# Patient Record
Sex: MALE | Race: WHITE | NOT HISPANIC OR LATINO
[De-identification: names, ages, dates, MRNs, and addresses within clinical notes are randomized per-mention and may not be internally consistent; named-entity substitution may affect disease eponyms.]

---

## 2017-12-15 ENCOUNTER — APPOINTMENT (OUTPATIENT)
Dept: NEUROSURGERY | Facility: CLINIC | Age: 52
End: 2017-12-15
Payer: MEDICAID

## 2017-12-15 VITALS
BODY MASS INDEX: 24.3 KG/M2 | OXYGEN SATURATION: 95 % | HEART RATE: 94 BPM | HEIGHT: 78 IN | SYSTOLIC BLOOD PRESSURE: 116 MMHG | DIASTOLIC BLOOD PRESSURE: 77 MMHG | WEIGHT: 210 LBS

## 2017-12-15 DIAGNOSIS — G40.909 EPILEPSY, UNSPECIFIED, NOT INTRACTABLE, W/OUT STATUS EPILEPTICUS: ICD-10-CM

## 2017-12-15 DIAGNOSIS — Z78.9 OTHER SPECIFIED HEALTH STATUS: ICD-10-CM

## 2017-12-15 DIAGNOSIS — D35.2 BENIGN NEOPLASM OF PITUITARY GLAND: ICD-10-CM

## 2017-12-15 PROCEDURE — 99204 OFFICE O/P NEW MOD 45 MIN: CPT

## 2018-03-01 ENCOUNTER — RESULT CHARGE (OUTPATIENT)
Age: 53
End: 2018-03-01

## 2018-03-02 ENCOUNTER — OUTPATIENT (OUTPATIENT)
Dept: OUTPATIENT SERVICES | Facility: HOSPITAL | Age: 53
LOS: 1 days | End: 2018-03-02
Payer: COMMERCIAL

## 2018-03-02 ENCOUNTER — APPOINTMENT (OUTPATIENT)
Dept: NEUROSURGERY | Facility: CLINIC | Age: 53
End: 2018-03-02
Payer: MEDICAID

## 2018-03-02 ENCOUNTER — FORM ENCOUNTER (OUTPATIENT)
Age: 53
End: 2018-03-02

## 2018-03-02 VITALS
WEIGHT: 208 LBS | BODY MASS INDEX: 24.07 KG/M2 | OXYGEN SATURATION: 98 % | SYSTOLIC BLOOD PRESSURE: 122 MMHG | HEART RATE: 80 BPM | HEIGHT: 78 IN | DIASTOLIC BLOOD PRESSURE: 78 MMHG

## 2018-03-02 DIAGNOSIS — Z01.818 ENCOUNTER FOR OTHER PREPROCEDURAL EXAMINATION: ICD-10-CM

## 2018-03-02 DIAGNOSIS — F12.90 CANNABIS USE, UNSPECIFIED, UNCOMPLICATED: ICD-10-CM

## 2018-03-02 DIAGNOSIS — Z78.9 OTHER SPECIFIED HEALTH STATUS: ICD-10-CM

## 2018-03-02 LAB
ALBUMIN SERPL ELPH-MCNC: 4.4 G/DL — SIGNIFICANT CHANGE UP (ref 3.3–5)
ALP SERPL-CCNC: 57 U/L — SIGNIFICANT CHANGE UP (ref 40–120)
ALT FLD-CCNC: 24 U/L — SIGNIFICANT CHANGE UP (ref 10–45)
ANION GAP SERPL CALC-SCNC: 14 MMOL/L — SIGNIFICANT CHANGE UP (ref 5–17)
APTT BLD: 30.1 SEC — SIGNIFICANT CHANGE UP (ref 27.5–37.4)
AST SERPL-CCNC: 22 U/L — SIGNIFICANT CHANGE UP (ref 10–40)
BILIRUB SERPL-MCNC: 0.6 MG/DL — SIGNIFICANT CHANGE UP (ref 0.2–1.2)
BUN SERPL-MCNC: 20 MG/DL — SIGNIFICANT CHANGE UP (ref 7–23)
CALCIUM SERPL-MCNC: 9.4 MG/DL — SIGNIFICANT CHANGE UP (ref 8.4–10.5)
CHLORIDE SERPL-SCNC: 100 MMOL/L — SIGNIFICANT CHANGE UP (ref 96–108)
CO2 SERPL-SCNC: 27 MMOL/L — SIGNIFICANT CHANGE UP (ref 22–31)
CREAT SERPL-MCNC: 1.23 MG/DL — SIGNIFICANT CHANGE UP (ref 0.5–1.3)
GLUCOSE SERPL-MCNC: 96 MG/DL — SIGNIFICANT CHANGE UP (ref 70–99)
HCT VFR BLD CALC: 37.9 % — LOW (ref 39–50)
HGB BLD-MCNC: 12.4 G/DL — LOW (ref 13–17)
INR BLD: 0.92 — SIGNIFICANT CHANGE UP (ref 0.88–1.16)
MCHC RBC-ENTMCNC: 29.8 PG — SIGNIFICANT CHANGE UP (ref 27–34)
MCHC RBC-ENTMCNC: 32.7 G/DL — SIGNIFICANT CHANGE UP (ref 32–36)
MCV RBC AUTO: 91.1 FL — SIGNIFICANT CHANGE UP (ref 80–100)
PLATELET # BLD AUTO: 215 K/UL — SIGNIFICANT CHANGE UP (ref 150–400)
POTASSIUM SERPL-MCNC: 4.3 MMOL/L — SIGNIFICANT CHANGE UP (ref 3.5–5.3)
POTASSIUM SERPL-SCNC: 4.3 MMOL/L — SIGNIFICANT CHANGE UP (ref 3.5–5.3)
PROT SERPL-MCNC: 6.9 G/DL — SIGNIFICANT CHANGE UP (ref 6–8.3)
PROTHROM AB SERPL-ACNC: 10.2 SEC — SIGNIFICANT CHANGE UP (ref 9.8–12.7)
RBC # BLD: 4.16 M/UL — LOW (ref 4.2–5.8)
RBC # FLD: 14.4 % — SIGNIFICANT CHANGE UP (ref 10.3–16.9)
SODIUM SERPL-SCNC: 141 MMOL/L — SIGNIFICANT CHANGE UP (ref 135–145)
WBC # BLD: 4.8 K/UL — SIGNIFICANT CHANGE UP (ref 3.8–10.5)
WBC # FLD AUTO: 4.8 K/UL — SIGNIFICANT CHANGE UP (ref 3.8–10.5)

## 2018-03-02 PROCEDURE — 93010 ELECTROCARDIOGRAM REPORT: CPT

## 2018-03-02 PROCEDURE — 99214 OFFICE O/P EST MOD 30 MIN: CPT

## 2018-03-02 PROCEDURE — 85027 COMPLETE CBC AUTOMATED: CPT

## 2018-03-02 PROCEDURE — 85730 THROMBOPLASTIN TIME PARTIAL: CPT

## 2018-03-02 PROCEDURE — 80053 COMPREHEN METABOLIC PANEL: CPT

## 2018-03-02 PROCEDURE — 85610 PROTHROMBIN TIME: CPT

## 2018-03-02 PROCEDURE — 93005 ELECTROCARDIOGRAM TRACING: CPT

## 2018-03-03 ENCOUNTER — OUTPATIENT (OUTPATIENT)
Dept: OUTPATIENT SERVICES | Facility: HOSPITAL | Age: 53
LOS: 1 days | End: 2018-03-03
Payer: COMMERCIAL

## 2018-03-03 ENCOUNTER — APPOINTMENT (OUTPATIENT)
Dept: MRI IMAGING | Facility: HOSPITAL | Age: 53
End: 2018-03-03
Payer: MEDICAID

## 2018-03-03 PROBLEM — F12.90 USE OF CANNABIS: Status: ACTIVE | Noted: 2018-03-03

## 2018-03-03 PROCEDURE — 70553 MRI BRAIN STEM W/O & W/DYE: CPT | Mod: 26

## 2018-03-03 PROCEDURE — A9585: CPT

## 2018-03-03 PROCEDURE — 70553 MRI BRAIN STEM W/O & W/DYE: CPT

## 2018-03-12 VITALS
HEART RATE: 80 BPM | HEIGHT: 78 IN | DIASTOLIC BLOOD PRESSURE: 65 MMHG | SYSTOLIC BLOOD PRESSURE: 104 MMHG | RESPIRATION RATE: 16 BRPM | WEIGHT: 218.92 LBS | OXYGEN SATURATION: 98 % | TEMPERATURE: 97 F

## 2018-03-13 ENCOUNTER — RESULT REVIEW (OUTPATIENT)
Age: 53
End: 2018-03-13

## 2018-03-13 ENCOUNTER — APPOINTMENT (OUTPATIENT)
Dept: NEUROSURGERY | Facility: HOSPITAL | Age: 53
End: 2018-03-13

## 2018-03-13 ENCOUNTER — INPATIENT (INPATIENT)
Facility: HOSPITAL | Age: 53
LOS: 6 days | Discharge: NURSING, REHAB OR LONG-TERM CARE | DRG: 27 | End: 2018-03-20
Attending: NEUROLOGICAL SURGERY | Admitting: NEUROLOGICAL SURGERY
Payer: COMMERCIAL

## 2018-03-13 DIAGNOSIS — F41.8 OTHER SPECIFIED ANXIETY DISORDERS: ICD-10-CM

## 2018-03-13 DIAGNOSIS — C71.9 MALIGNANT NEOPLASM OF BRAIN, UNSPECIFIED: ICD-10-CM

## 2018-03-13 DIAGNOSIS — Z98.890 OTHER SPECIFIED POSTPROCEDURAL STATES: Chronic | ICD-10-CM

## 2018-03-13 DIAGNOSIS — G40.909 EPILEPSY, UNSPECIFIED, NOT INTRACTABLE, WITHOUT STATUS EPILEPTICUS: ICD-10-CM

## 2018-03-13 LAB
ANION GAP SERPL CALC-SCNC: 11 MMOL/L — SIGNIFICANT CHANGE UP (ref 5–17)
BUN SERPL-MCNC: 20 MG/DL — SIGNIFICANT CHANGE UP (ref 7–23)
CALCIUM SERPL-MCNC: 9.1 MG/DL — SIGNIFICANT CHANGE UP (ref 8.4–10.5)
CHLORIDE SERPL-SCNC: 97 MMOL/L — SIGNIFICANT CHANGE UP (ref 96–108)
CO2 SERPL-SCNC: 27 MMOL/L — SIGNIFICANT CHANGE UP (ref 22–31)
CREAT SERPL-MCNC: 1.17 MG/DL — SIGNIFICANT CHANGE UP (ref 0.5–1.3)
GLUCOSE BLDC GLUCOMTR-MCNC: 129 MG/DL — HIGH (ref 70–99)
GLUCOSE BLDC GLUCOMTR-MCNC: 143 MG/DL — HIGH (ref 70–99)
GLUCOSE SERPL-MCNC: 145 MG/DL — HIGH (ref 70–99)
HCT VFR BLD CALC: 39.8 % — SIGNIFICANT CHANGE UP (ref 39–50)
HGB BLD-MCNC: 13.2 G/DL — SIGNIFICANT CHANGE UP (ref 13–17)
INR BLD: 0.94 — SIGNIFICANT CHANGE UP (ref 0.88–1.16)
MAGNESIUM SERPL-MCNC: 2 MG/DL — SIGNIFICANT CHANGE UP (ref 1.6–2.6)
MCHC RBC-ENTMCNC: 30.6 PG — SIGNIFICANT CHANGE UP (ref 27–34)
MCHC RBC-ENTMCNC: 33.2 G/DL — SIGNIFICANT CHANGE UP (ref 32–36)
MCV RBC AUTO: 92.1 FL — SIGNIFICANT CHANGE UP (ref 80–100)
PHOSPHATE SERPL-MCNC: 3 MG/DL — SIGNIFICANT CHANGE UP (ref 2.5–4.5)
PLATELET # BLD AUTO: 202 K/UL — SIGNIFICANT CHANGE UP (ref 150–400)
POTASSIUM SERPL-MCNC: 4.2 MMOL/L — SIGNIFICANT CHANGE UP (ref 3.5–5.3)
POTASSIUM SERPL-SCNC: 4.2 MMOL/L — SIGNIFICANT CHANGE UP (ref 3.5–5.3)
PROTHROM AB SERPL-ACNC: 10.4 SEC — SIGNIFICANT CHANGE UP (ref 9.8–12.7)
RBC # BLD: 4.32 M/UL — SIGNIFICANT CHANGE UP (ref 4.2–5.8)
RBC # FLD: 14.7 % — SIGNIFICANT CHANGE UP (ref 10.3–16.9)
SODIUM SERPL-SCNC: 135 MMOL/L — SIGNIFICANT CHANGE UP (ref 135–145)
WBC # BLD: 8.8 K/UL — SIGNIFICANT CHANGE UP (ref 3.8–10.5)
WBC # FLD AUTO: 8.8 K/UL — SIGNIFICANT CHANGE UP (ref 3.8–10.5)

## 2018-03-13 PROCEDURE — 15750 NEUROVASCULAR PEDICLE FLAP: CPT

## 2018-03-13 PROCEDURE — 70552 MRI BRAIN STEM W/DYE: CPT | Mod: 26

## 2018-03-13 PROCEDURE — 99291 CRITICAL CARE FIRST HOUR: CPT | Mod: 24

## 2018-03-13 PROCEDURE — 61781 SCAN PROC CRANIAL INTRA: CPT

## 2018-03-13 PROCEDURE — 61510 CRNEC TREPH EXC BRN TUM STTL: CPT

## 2018-03-13 RX ORDER — LAMOTRIGINE 25 MG/1
60 TABLET, ORALLY DISINTEGRATING ORAL DAILY
Qty: 0 | Refills: 0 | Status: DISCONTINUED | OUTPATIENT
Start: 2018-03-13 | End: 2018-03-13

## 2018-03-13 RX ORDER — SENNA PLUS 8.6 MG/1
1 TABLET ORAL AT BEDTIME
Qty: 0 | Refills: 0 | Status: DISCONTINUED | OUTPATIENT
Start: 2018-03-13 | End: 2018-03-20

## 2018-03-13 RX ORDER — LAMOTRIGINE 25 MG/1
25 TABLET, ORALLY DISINTEGRATING ORAL DAILY
Qty: 0 | Refills: 0 | Status: DISCONTINUED | OUTPATIENT
Start: 2018-03-13 | End: 2018-03-20

## 2018-03-13 RX ORDER — HYDRALAZINE HCL 50 MG
10 TABLET ORAL ONCE
Qty: 0 | Refills: 0 | Status: COMPLETED | OUTPATIENT
Start: 2018-03-13 | End: 2018-03-13

## 2018-03-13 RX ORDER — ARIPIPRAZOLE 15 MG/1
15 TABLET ORAL DAILY
Qty: 0 | Refills: 0 | Status: DISCONTINUED | OUTPATIENT
Start: 2018-03-13 | End: 2018-03-20

## 2018-03-13 RX ORDER — DEXTROSE 50 % IN WATER 50 %
12.5 SYRINGE (ML) INTRAVENOUS ONCE
Qty: 0 | Refills: 0 | Status: DISCONTINUED | OUTPATIENT
Start: 2018-03-13 | End: 2018-03-20

## 2018-03-13 RX ORDER — FLUOXETINE HCL 10 MG
60 CAPSULE ORAL DAILY
Qty: 0 | Refills: 0 | Status: DISCONTINUED | OUTPATIENT
Start: 2018-03-13 | End: 2018-03-13

## 2018-03-13 RX ORDER — ZOLPIDEM TARTRATE 10 MG/1
5 TABLET ORAL AT BEDTIME
Qty: 0 | Refills: 0 | Status: DISCONTINUED | OUTPATIENT
Start: 2018-03-13 | End: 2018-03-13

## 2018-03-13 RX ORDER — SODIUM CHLORIDE 9 MG/ML
1000 INJECTION, SOLUTION INTRAVENOUS
Qty: 0 | Refills: 0 | Status: DISCONTINUED | OUTPATIENT
Start: 2018-03-13 | End: 2018-03-13

## 2018-03-13 RX ORDER — SODIUM CHLORIDE 9 MG/ML
1000 INJECTION INTRAMUSCULAR; INTRAVENOUS; SUBCUTANEOUS
Qty: 0 | Refills: 0 | Status: DISCONTINUED | OUTPATIENT
Start: 2018-03-13 | End: 2018-03-15

## 2018-03-13 RX ORDER — LABETALOL HCL 100 MG
20 TABLET ORAL ONCE
Qty: 0 | Refills: 0 | Status: DISCONTINUED | OUTPATIENT
Start: 2018-03-13 | End: 2018-03-20

## 2018-03-13 RX ORDER — CEFAZOLIN SODIUM 1 G
1000 VIAL (EA) INJECTION EVERY 8 HOURS
Qty: 0 | Refills: 0 | Status: DISCONTINUED | OUTPATIENT
Start: 2018-03-13 | End: 2018-03-13

## 2018-03-13 RX ORDER — DOCUSATE SODIUM 100 MG
100 CAPSULE ORAL THREE TIMES A DAY
Qty: 0 | Refills: 0 | Status: DISCONTINUED | OUTPATIENT
Start: 2018-03-13 | End: 2018-03-20

## 2018-03-13 RX ORDER — FLUOXETINE HCL 10 MG
60 CAPSULE ORAL DAILY
Qty: 0 | Refills: 0 | Status: DISCONTINUED | OUTPATIENT
Start: 2018-03-13 | End: 2018-03-20

## 2018-03-13 RX ORDER — RISPERIDONE 4 MG/1
0.5 TABLET ORAL
Qty: 0 | Refills: 0 | Status: DISCONTINUED | OUTPATIENT
Start: 2018-03-13 | End: 2018-03-20

## 2018-03-13 RX ORDER — GLUCAGON INJECTION, SOLUTION 0.5 MG/.1ML
1 INJECTION, SOLUTION SUBCUTANEOUS ONCE
Qty: 0 | Refills: 0 | Status: DISCONTINUED | OUTPATIENT
Start: 2018-03-13 | End: 2018-03-20

## 2018-03-13 RX ORDER — CEFAZOLIN SODIUM 1 G
1000 VIAL (EA) INJECTION EVERY 8 HOURS
Qty: 0 | Refills: 0 | Status: COMPLETED | OUTPATIENT
Start: 2018-03-13 | End: 2018-03-14

## 2018-03-13 RX ORDER — DEXTROSE 50 % IN WATER 50 %
1 SYRINGE (ML) INTRAVENOUS ONCE
Qty: 0 | Refills: 0 | Status: DISCONTINUED | OUTPATIENT
Start: 2018-03-13 | End: 2018-03-20

## 2018-03-13 RX ORDER — LABETALOL HCL 100 MG
20 TABLET ORAL ONCE
Qty: 0 | Refills: 0 | Status: DISCONTINUED | OUTPATIENT
Start: 2018-03-13 | End: 2018-03-13

## 2018-03-13 RX ORDER — DEXAMETHASONE 0.5 MG/5ML
4 ELIXIR ORAL EVERY 6 HOURS
Qty: 0 | Refills: 0 | Status: DISCONTINUED | OUTPATIENT
Start: 2018-03-13 | End: 2018-03-15

## 2018-03-13 RX ORDER — INFLUENZA VIRUS VACCINE 15; 15; 15; 15 UG/.5ML; UG/.5ML; UG/.5ML; UG/.5ML
0.5 SUSPENSION INTRAMUSCULAR ONCE
Qty: 0 | Refills: 0 | Status: COMPLETED | OUTPATIENT
Start: 2018-03-13 | End: 2018-03-13

## 2018-03-13 RX ORDER — INSULIN LISPRO 100/ML
VIAL (ML) SUBCUTANEOUS
Qty: 0 | Refills: 0 | Status: DISCONTINUED | OUTPATIENT
Start: 2018-03-13 | End: 2018-03-20

## 2018-03-13 RX ORDER — ACETAMINOPHEN 500 MG
650 TABLET ORAL EVERY 6 HOURS
Qty: 0 | Refills: 0 | Status: DISCONTINUED | OUTPATIENT
Start: 2018-03-13 | End: 2018-03-20

## 2018-03-13 RX ORDER — ONDANSETRON 8 MG/1
4 TABLET, FILM COATED ORAL EVERY 6 HOURS
Qty: 0 | Refills: 0 | Status: DISCONTINUED | OUTPATIENT
Start: 2018-03-13 | End: 2018-03-20

## 2018-03-13 RX ORDER — LEVETIRACETAM 250 MG/1
1000 TABLET, FILM COATED ORAL EVERY 12 HOURS
Qty: 0 | Refills: 0 | Status: DISCONTINUED | OUTPATIENT
Start: 2018-03-13 | End: 2018-03-20

## 2018-03-13 RX ORDER — ACETAMINOPHEN 500 MG
650 TABLET ORAL EVERY 6 HOURS
Qty: 0 | Refills: 0 | Status: DISCONTINUED | OUTPATIENT
Start: 2018-03-13 | End: 2018-03-14

## 2018-03-13 RX ORDER — PANTOPRAZOLE SODIUM 20 MG/1
40 TABLET, DELAYED RELEASE ORAL DAILY
Qty: 0 | Refills: 0 | Status: DISCONTINUED | OUTPATIENT
Start: 2018-03-13 | End: 2018-03-16

## 2018-03-13 RX ADMIN — Medication 100 MILLIGRAM(S): at 20:58

## 2018-03-13 RX ADMIN — Medication 1000 MILLIGRAM(S): at 16:32

## 2018-03-13 RX ADMIN — Medication 4 MILLIGRAM(S): at 20:37

## 2018-03-13 RX ADMIN — LEVETIRACETAM 1000 MILLIGRAM(S): 250 TABLET, FILM COATED ORAL at 20:56

## 2018-03-13 RX ADMIN — Medication 325 MILLIGRAM(S): at 20:46

## 2018-03-13 NOTE — PROGRESS NOTE ADULT - SUBJECTIVE AND OBJECTIVE BOX
S/Overnight events:  POD 0 s/p left sided lainey hole craniotomy w/ parietal glioma removal.  Pt examined at bedside in the PACU, states that he is feeling well other then mild pain over the incision site, pt has no other complaints.       Hospital Course:   POD# 0      Vital Signs Last 24 Hrs  T(C): 36.3 (13 Mar 2018 13:25), Max: 36.3 (13 Mar 2018 13:25)  T(F): 97.3 (13 Mar 2018 13:25), Max: 97.3 (13 Mar 2018 13:25)  HR: 76 (13 Mar 2018 15:40) (76 - 90)  BP: 110/79 (13 Mar 2018 15:40) (107/79 - 133/77)  BP(mean): 95 (13 Mar 2018 15:40) (93 - 105)  RR: 15 (13 Mar 2018 15:40) (4 - 18)  SpO2: 100% (13 Mar 2018 15:40) (100% - 100%)    I&O's Detail    13 Mar 2018 07:01  -  13 Mar 2018 15:51  --------------------------------------------------------  IN:    sodium chloride 0.9%.: 240 mL  Total IN: 240 mL    OUT:    Indwelling Catheter - Urethral: 350 mL  Total OUT: 350 mL    Total NET: -110 mL        I&O's Summary    13 Mar 2018 07:01  -  13 Mar 2018 15:51  --------------------------------------------------------  IN: 240 mL / OUT: 350 mL / NET: -110 mL        PHYSICAL EXAM:  General:  pt resting comfortably in bed, in NAD, AAOx3.  HEENT:  PERLL,EOMI, head normocephalic, atruamatic, surgical dressing clean, dry, intact.  Cardiac: RRR, S1,S2, no murmurs, gallops rubs  Pulmonary:  CTA bilaterally, pt oxygenating well.  Gastrointestinal:  soft, non-distended, non-tender, bowel sounds present.   Extremities:  2+ pulses throughout   Neurological:  pt not amendable to complete exam.  CNII-XII intact, shoulder flexion 5/5 bilaterally, elbow flexion and extension 5/5 bilaterally,  5/5 bilaterally, hip flexion 5/5 bilaterally, pt not amendable to remainder of exam.  Pt followed commands, speech intact, sensation to light touch intact.         LABS:                        13.2   8.8   )-----------( 202      ( 13 Mar 2018 14:14 )             39.8     03-13    135  |  97  |  20  ----------------------------<  145<H>  4.2   |  27  |  1.17    Ca    9.1      13 Mar 2018 14:05  Phos  3.0     03-13  Mg     2.0     03-13      PT/INR - ( 13 Mar 2018 14:05 )   PT: 10.4 sec;   INR: 0.94                  CAPILLARY BLOOD GLUCOSE          Drug Levels: [] N/A    CSF Analysis: [] N/A      Allergies    No Known Allergies    Intolerances      MEDICATIONS:  Antibiotics:  ceFAZolin  Injectable. 1000 milliGRAM(s) IV Push every 8 hours    Neuro:  acetaminophen   Tablet 650 milliGRAM(s) Oral every 6 hours PRN  acetaminophen   Tablet. 650 milliGRAM(s) Oral every 6 hours PRN  ARIPiprazole 15 milliGRAM(s) Oral daily  FLUoxetine 60 milliGRAM(s) Oral daily  lamoTRIgine 25 milliGRAM(s) Oral daily  levETIRAcetam 1000 milliGRAM(s) Oral every 12 hours  ondansetron Injectable 4 milliGRAM(s) IV Push every 6 hours PRN  risperiDONE   Tablet 0.5 milliGRAM(s) Oral two times a day  zolpidem 5 milliGRAM(s) Oral at bedtime PRN  zolpidem 5 milliGRAM(s) Oral at bedtime PRN    Anticoagulation:    OTHER:  dexamethasone  Injectable 4 milliGRAM(s) IV Push every 6 hours  dextrose 50% Injectable 12.5 Gram(s) IV Push once  dextrose Gel 1 Dose(s) Oral once PRN  docusate sodium 100 milliGRAM(s) Oral three times a day  glucagon  Injectable 1 milliGRAM(s) IntraMuscular once PRN  influenza   Vaccine 0.5 milliLiter(s) IntraMuscular once  insulin lispro (HumaLOG) corrective regimen sliding scale   SubCutaneous Before meals and at bedtime  labetalol Injectable 20 milliGRAM(s) IV Push once PRN  pantoprazole  Injectable 40 milliGRAM(s) IV Push daily  senna 1 Tablet(s) Oral at bedtime    IVF:  dextrose 5%. 1000 milliLiter(s) IV Continuous <Continuous>  sodium chloride 0.9%. 1000 milliLiter(s) IV Continuous <Continuous>        ASSESSMENT:  52y Male s/p Left lainey hole craneiotomy w/ parietal glioma resection currently has no complaints other then mild pain over the incision site.  Pt tolerated the procedure well tumor specimen sent to pathology report pending.        PLAN:  NEURO:  neuro checks q1 hr  continue keppra  pain control prn  odansetron prn  colace  continue abilify, lamictal, and risperidone      CARDIOVASCULAR:  replete electrolytes prn  maintain systolic bp<160      PULMONARY:  monitor O2 saturation    RENAL:  ferguson in place to monitor I's & O's    GI:  ppi  normal diet    HEME:  DVT ppx venodynes     ID:  Afebrile    ENDO:  Sliding scale         DISPOSITION:   NSICU S/Overnight events:  POD 0 s/p left sided lainey hole craniotomy w/ parietal glioma removal.  Pt examined at bedside in the PACU, states that he is feeling well other then mild pain over the incision site, pt has no other complaints.       Hospital Course:   POD# 0      Vital Signs Last 24 Hrs  T(C): 36.3 (13 Mar 2018 13:25), Max: 36.3 (13 Mar 2018 13:25)  T(F): 97.3 (13 Mar 2018 13:25), Max: 97.3 (13 Mar 2018 13:25)  HR: 76 (13 Mar 2018 15:40) (76 - 90)  BP: 110/79 (13 Mar 2018 15:40) (107/79 - 133/77)  BP(mean): 95 (13 Mar 2018 15:40) (93 - 105)  RR: 15 (13 Mar 2018 15:40) (4 - 18)  SpO2: 100% (13 Mar 2018 15:40) (100% - 100%)    I&O's Detail    13 Mar 2018 07:01  -  13 Mar 2018 15:51  --------------------------------------------------------  IN:    sodium chloride 0.9%.: 240 mL  Total IN: 240 mL    OUT:    Indwelling Catheter - Urethral: 350 mL  Total OUT: 350 mL    Total NET: -110 mL        I&O's Summary    13 Mar 2018 07:01  -  13 Mar 2018 15:51  --------------------------------------------------------  IN: 240 mL / OUT: 350 mL / NET: -110 mL        PHYSICAL EXAM:  General:  pt resting comfortably in bed, in NAD, AAOx3.  HEENT:  PERLL,EOMI, head normocephalic, atruamatic, surgical dressing clean, dry, intact.  Cardiac: RRR, S1,S2, no murmurs, gallops rubs  Pulmonary:  CTA bilaterally, pt oxygenating well.  Gastrointestinal:  soft, non-distended, non-tender, bowel sounds present.   Extremities:  2+ pulses throughout   Neurological:  pt not amendable to complete exam.  CNII-XII intact, shoulder flexion 5/5 bilaterally, elbow flexion and extension 5/5 bilaterally,  5/5 bilaterally, hip flexion 5/5 bilaterally, pt not amendable to remainder of exam.  Pt followed commands, speech intact, sensation to light touch intact throughout.  Reflexes 2+ throughout         LABS:                        13.2   8.8   )-----------( 202      ( 13 Mar 2018 14:14 )             39.8     03-13    135  |  97  |  20  ----------------------------<  145<H>  4.2   |  27  |  1.17    Ca    9.1      13 Mar 2018 14:05  Phos  3.0     03-13  Mg     2.0     03-13      PT/INR - ( 13 Mar 2018 14:05 )   PT: 10.4 sec;   INR: 0.94                  CAPILLARY BLOOD GLUCOSE          Drug Levels: [] N/A    CSF Analysis: [] N/A      Allergies    No Known Allergies    Intolerances      MEDICATIONS:  Antibiotics:  ceFAZolin  Injectable. 1000 milliGRAM(s) IV Push every 8 hours    Neuro:  acetaminophen   Tablet 650 milliGRAM(s) Oral every 6 hours PRN  acetaminophen   Tablet. 650 milliGRAM(s) Oral every 6 hours PRN  ARIPiprazole 15 milliGRAM(s) Oral daily  FLUoxetine 60 milliGRAM(s) Oral daily  lamoTRIgine 25 milliGRAM(s) Oral daily  levETIRAcetam 1000 milliGRAM(s) Oral every 12 hours  ondansetron Injectable 4 milliGRAM(s) IV Push every 6 hours PRN  risperiDONE   Tablet 0.5 milliGRAM(s) Oral two times a day  zolpidem 5 milliGRAM(s) Oral at bedtime PRN  zolpidem 5 milliGRAM(s) Oral at bedtime PRN    Anticoagulation:    OTHER:  dexamethasone  Injectable 4 milliGRAM(s) IV Push every 6 hours  dextrose 50% Injectable 12.5 Gram(s) IV Push once  dextrose Gel 1 Dose(s) Oral once PRN  docusate sodium 100 milliGRAM(s) Oral three times a day  glucagon  Injectable 1 milliGRAM(s) IntraMuscular once PRN  influenza   Vaccine 0.5 milliLiter(s) IntraMuscular once  insulin lispro (HumaLOG) corrective regimen sliding scale   SubCutaneous Before meals and at bedtime  labetalol Injectable 20 milliGRAM(s) IV Push once PRN  pantoprazole  Injectable 40 milliGRAM(s) IV Push daily  senna 1 Tablet(s) Oral at bedtime    IVF:  dextrose 5%. 1000 milliLiter(s) IV Continuous <Continuous>  sodium chloride 0.9%. 1000 milliLiter(s) IV Continuous <Continuous>        ASSESSMENT:  52y Male s/p Left lainey hole craneiotomy w/ parietal glioma resection currently has no complaints other then mild pain over the incision site.  Pt tolerated the procedure well tumor specimen sent to pathology report pending.        PLAN:  NEURO:  neuro checks q1 hr  continue keppra  pain control prn  odansetron prn  colace  continue abilify, lamictal, and risperidone      CARDIOVASCULAR:  replete electrolytes prn  maintain systolic bp<160      PULMONARY:  monitor O2 saturation  CXR     RENAL:  ferguson in place to monitor I's & O's    GI:  ppi  normal diet    HEME:  DVT ppx venodynes     ID:  Afebrile    ENDO:  Sliding scale         DISPOSITION:   NSICU  pt and plan discussed and agreed upon with Dr. Lyon Sub: POD 0 s/p left sided stereotactic craniotomy w/ parietal glioma removal.  Pt examined at bedside in the PACU, states that he is feeling well other than mild pain over the incision site, pt has no other complaints.       Hospital Course:   POD# 0 - s/p left stereotactic craniotomy for GBM resection, tolerated well, neuro baseline post-op.      Vital Signs Last 24 Hrs  T(C): 36.3 (13 Mar 2018 13:25), Max: 36.3 (13 Mar 2018 13:25)  T(F): 97.3 (13 Mar 2018 13:25), Max: 97.3 (13 Mar 2018 13:25)  HR: 76 (13 Mar 2018 15:40) (76 - 90)  BP: 110/79 (13 Mar 2018 15:40) (107/79 - 133/77)  BP(mean): 95 (13 Mar 2018 15:40) (93 - 105)  RR: 15 (13 Mar 2018 15:40) (4 - 18)  SpO2: 100% (13 Mar 2018 15:40) (100% - 100%)    I&O's Detail    13 Mar 2018 07:01  -  13 Mar 2018 15:51  --------------------------------------------------------  IN:    sodium chloride 0.9%.: 240 mL  Total IN: 240 mL    OUT:    Indwelling Catheter - Urethral: 350 mL  Total OUT: 350 mL    Total NET: -110 mL        I&O's Summary    13 Mar 2018 07:01  -  13 Mar 2018 15:51  --------------------------------------------------------  IN: 240 mL / OUT: 350 mL / NET: -110 mL        PHYSICAL EXAM:  General:  pt resting comfortably in bed, in NAD, AAOx3.  HEENT:  PERRL, EOMI, left high parietal incision C/D/I w/ staples.  Cardiac: RRR, S1,S2, no murmurs, gallops rubs  Pulmonary:  CTA bilaterally.  Gastrointestinal:  soft, non-distended, non-tender, bowel sounds present.   Extremities:  2+ pulses throughout   Neurological:  CNII-XII intact. Elbow flexion and extension 5/5 bilaterally,  5/5 bilaterally, hip flexion 5/5 bilaterally, pt not amendable to remainder of motor exam.  Pt followed commands, speech intact, sensation to light touch intact throughout.  Gait not assessed.        LABS:                        13.2   8.8   )-----------( 202      ( 13 Mar 2018 14:14 )             39.8     03-13    135  |  97  |  20  ----------------------------<  145<H>  4.2   |  27  |  1.17    Ca    9.1      13 Mar 2018 14:05  Phos  3.0     03-13  Mg     2.0     03-13      PT/INR - ( 13 Mar 2018 14:05 )   PT: 10.4 sec;   INR: 0.94         CAPILLARY BLOOD GLUCOSE          Drug Levels: [] N/A    CSF Analysis: [] N/A      Allergies    No Known Allergies    Intolerances      MEDICATIONS:  Antibiotics:  ceFAZolin  Injectable. 1000 milliGRAM(s) IV Push every 8 hours    Neuro:  acetaminophen   Tablet 650 milliGRAM(s) Oral every 6 hours PRN  acetaminophen   Tablet. 650 milliGRAM(s) Oral every 6 hours PRN  ARIPiprazole 15 milliGRAM(s) Oral daily  FLUoxetine 60 milliGRAM(s) Oral daily  lamoTRIgine 25 milliGRAM(s) Oral daily  levETIRAcetam 1000 milliGRAM(s) Oral every 12 hours  ondansetron Injectable 4 milliGRAM(s) IV Push every 6 hours PRN  risperiDONE   Tablet 0.5 milliGRAM(s) Oral two times a day  zolpidem 5 milliGRAM(s) Oral at bedtime PRN  zolpidem 5 milliGRAM(s) Oral at bedtime PRN    Anticoagulation:    OTHER:  dexamethasone  Injectable 4 milliGRAM(s) IV Push every 6 hours  dextrose 50% Injectable 12.5 Gram(s) IV Push once  dextrose Gel 1 Dose(s) Oral once PRN  docusate sodium 100 milliGRAM(s) Oral three times a day  glucagon  Injectable 1 milliGRAM(s) IntraMuscular once PRN  influenza   Vaccine 0.5 milliLiter(s) IntraMuscular once  insulin lispro (HumaLOG) corrective regimen sliding scale   SubCutaneous Before meals and at bedtime  labetalol Injectable 20 milliGRAM(s) IV Push once PRN  pantoprazole  Injectable 40 milliGRAM(s) IV Push daily  senna 1 Tablet(s) Oral at bedtime    IVF:  dextrose 5%. 1000 milliLiter(s) IV Continuous <Continuous>  sodium chloride 0.9%. 1000 milliLiter(s) IV Continuous <Continuous>        ASSESSMENT:  52y Male w/ h/o seizures, Depression/Anxiety, right parietal GBM s/p resection w/ [place] [address] now with left parietal recurrence s/p Left stereotactic craniotomy w/ parietal GBM resection POD#0.    PLAN:  NEURO:  Neuro checks q1 hr  Cont Decadron 4mg q6h  continue Keppra and Lamictal for seizure prophylaxis,  Pain meds PRN  Continue Abilify, Prozac, and Risperidone for depression/anxiety  MRI Brain in 48 hours post-op    CARDIOVASCULAR:  replete electrolytes prn  maintain systolic bp<160  Daily labs    PULMONARY:  Monitor O2 saturation  Incentive spirometry    RENAL:  ferguson in place to monitor I's & O's    GI:  Advance PO diet as tolerated,  Continue PPI while on steroids,  Stool softeners PRN - Colace, Senna.    HEME:  SCDs, no chemoppx at this time.    ID:  Ancef x3 doses as prophylaxis    ENDO:  Sliding scale as ordered    DISPOSITION:   NSICU when bed available,  PT/OT and OOB tomorrow, as ordered.  D/w Dr. Mason and Dr. Lyon

## 2018-03-13 NOTE — PROGRESS NOTE ADULT - ASSESSMENT
ASSESSMENT:  52y Male w/ h/o seizures, Depression/Anxiety, right parietal GBM s/p resection w/ chemo/RT in [address] now with left parietal recurrence s/p Left stereotactic craniotomy w/ parietal GBM resection POD#0.    PLAN:  NEURO:  Neuro checks q1 hr  Cont Decadron 4mg q6h  continue Keppra and Lamictal for seizure prophylaxis,  Pain meds PRN  Continue Abilify, Prozac, and Risperidone for depression/anxiety  MRI Brain in 48 hours post-op    CARDIOVASCULAR:  replete electrolytes prn  maintain systolic bp<160  Daily labs    PULMONARY:  Monitor O2 saturation  Incentive spirometry    RENAL:  ferguson in place to monitor I's & O's    GI:  Advance PO diet as tolerated,  Continue PPI while on steroids,  Stool softeners PRN - Colace, Senna.    HEME:  SCDs, no chemoppx at this time.    ID:  Ancef x3 doses as prophylaxis    ENDO:  Sliding scale as ordered    DISPOSITION:   NSICU when bed available,  PT/OT and OOB tomorrow, as ordered.    remove ferguson and A line if ok w/ patient
Above reviewed, agree with plan, will d/w Dr. Mason

## 2018-03-13 NOTE — BRIEF OPERATIVE NOTE - PROCEDURE
<<-----Click on this checkbox to enter Procedure Left sided craniotomy  03/13/2018  for tumor resection  Active  SALINAS

## 2018-03-13 NOTE — BRIEF OPERATIVE NOTE - OPERATION/FINDINGS
Left sided burrhole craniotomy and gross total resection of brain tumor was performed. Frozen showed high grade glioma. No drains were placed. Uncomplicated, patient tolerated procedure well. Left sided stereotactic craniotomy and gross total resection of brain tumor was performed. Frozen showed high grade glioma. No drains were placed. Uncomplicated, patient tolerated procedure well.

## 2018-03-13 NOTE — H&P ADULT - ASSESSMENT
51 yo male with recurrent GBM     - For OR today  - MRI queenie done  - NPO   - Consent in chart.   - Medical clearance in chart  - IVF  - Patient took 1 g Keppra this morning  - No AC on board.     d/w Dr. Mason 53 yo male with recurrent Left sided brain tumor likely, GBM

## 2018-03-13 NOTE — PROGRESS NOTE ADULT - ATTENDING COMMENTS
I spent 45 minutes of critical care time examining patient, reviewing vitals, labs, medications, imaging and discussing with the team goals of care to prevent life-threatening in this patient who is at high risk for neurological deterioration or death due to:  brain hemorrhage

## 2018-03-13 NOTE — PROGRESS NOTE ADULT - SUBJECTIVE AND OBJECTIVE BOX
NEUROCRITICAL CARE PROGRESS NOTE    DU BAILEY   MRN-[id number]  Summary:  /  HPI:  53 yo M with PMHx seizure, GBM s/p resection, chemo, RT in 2017 p/w recurrent GBM. He was found to have recurrence on repeat MRI for which he presents today to undergo left craniotomy with tumor resection. He has no weakness, numbness, speech difficulties, HA or recent seizure. He is on Keppra 1g BID. He denies other systemic illness and any other complaints. (13 Mar 2018 07:47)      Sub: POD 0 s/p left sided stereotactic craniotomy w/ parietal glioma removal.  Drinking apple juice..      Vital Signs Last 24 Hrs  T(C): 36.3 (13 Mar 2018 13:25), Max: 36.3 (13 Mar 2018 13:25)  T(F): 97.3 (13 Mar 2018 13:25), Max: 97.3 (13 Mar 2018 13:25)  HR: 82 (13 Mar 2018 17:55) (76 - 90)  BP: 111/74 (13 Mar 2018 17:55) (107/79 - 133/77)  BP(mean): 88 (13 Mar 2018 16:55) (88 - 105)  RR: 18 (13 Mar 2018 17:55) (4 - 18)  SpO2: 98% (13 Mar 2018 17:55) (98% - 100%)      I&O's Detail    13 Mar 2018 07:01  -  13 Mar 2018 18:55  --------------------------------------------------------  IN:    sodium chloride 0.9%.: 320 mL  Total IN: 320 mL    OUT:    Indwelling Catheter - Urethral: 500 mL  Total OUT: 500 mL    Total NET: -180 mL      LABS:                        13.2   8.8   )-----------( 202      ( 13 Mar 2018 14:14 )             39.8     03-13    135  |  97  |  20  ----------------------------<  145<H>  4.2   |  27  |  1.17    Ca    9.1      13 Mar 2018 14:05  Phos  3.0     03-13  Mg     2.0     03-13      PT/INR - ( 13 Mar 2018 14:05 )   PT: 10.4 sec;   INR: 0.94          CAPILLARY BLOOD GLUCOSE      POCT Blood Glucose.: 129 mg/dL (13 Mar 2018 16:03)      Drug Levels: [] N/A    CSF Analysis: [] N/A      Allergies    No Known Allergies    Intolerances      MEDICATIONS:  Antibiotics:  ceFAZolin  Injectable. 1000 milliGRAM(s) IV Push every 8 hours    Neuro:  acetaminophen   Tablet 650 milliGRAM(s) Oral every 6 hours PRN  acetaminophen   Tablet. 650 milliGRAM(s) Oral every 6 hours PRN  ARIPiprazole 15 milliGRAM(s) Oral daily  FLUoxetine 60 milliGRAM(s) Oral daily  lamoTRIgine 25 milliGRAM(s) Oral daily  levETIRAcetam 1000 milliGRAM(s) Oral every 12 hours  ondansetron Injectable 4 milliGRAM(s) IV Push every 6 hours PRN  risperiDONE   Tablet 0.5 milliGRAM(s) Oral two times a day    Anticoagulation:    OTHER:  dexamethasone  Injectable 4 milliGRAM(s) IV Push every 6 hours  dextrose 50% Injectable 12.5 Gram(s) IV Push once  dextrose Gel 1 Dose(s) Oral once PRN  docusate sodium 100 milliGRAM(s) Oral three times a day  glucagon  Injectable 1 milliGRAM(s) IntraMuscular once PRN  influenza   Vaccine 0.5 milliLiter(s) IntraMuscular once  insulin lispro (HumaLOG) corrective regimen sliding scale   SubCutaneous Before meals and at bedtime  labetalol Injectable 20 milliGRAM(s) IV Push once PRN  pantoprazole  Injectable 40 milliGRAM(s) IV Push daily  senna 1 Tablet(s) Oral at bedtime    IVF:  sodium chloride 0.9%. 1000 milliLiter(s) IV Continuous <Continuous>    CULTURES:    RADIOLOGY & ADDITIONAL TESTS:      PHYSICAL EXAM:  General:  pt resting comfortably in bed, in NAD, AAOx3.  HEENT:  PERRL, EOMI, left high parietal incision C/D/I w/ staples.  Cardiac: RRR, S1,S2, no murmurs, gallops rubs  Pulmonary:  CTA bilaterally.  Gastrointestinal:  soft, non-distended, non-tender, bowel sounds present.   Extremities:  2+ pulses throughout   Neurological:  CNII-XII intact. Elbow flexion and extension 5/5 bilaterally,  5/5 bilaterally, hip flexion 5/5 bilaterally, pt not amendable to remainder of motor exam.  Pt followed commands, speech intact, sensation to light touch intact throughout.  Gait not assessed.

## 2018-03-13 NOTE — H&P ADULT - NSHPPHYSICALEXAM_GEN_ALL_CORE
AAOx 3, NAD, Speech clear, no facial droop or asymeetry. No hemineglect or gaze deviation.   Visual field and acuity intact. PERRL  Good strength all throughout  Sensation intact to light touch.

## 2018-03-13 NOTE — H&P ADULT - HISTORY OF PRESENT ILLNESS
53 yo M with PMHx seizure, GBM s/p resection, chemo, RT in 2017 p/w recurrent GBM. He was found to have recurrence on repeat MRI for which he presents today to undergo left craniotomy with tumor resection. He has no weakness, numbness, speech difficulties, HA or recent seizure. He is on Keppra 1g BID. 53 yo M with PMHx seizure, GBM s/p resection, chemo, RT in 2017 p/w recurrent GBM. He was found to have recurrence on repeat MRI for which he presents today to undergo left craniotomy with tumor resection. He has no weakness, numbness, speech difficulties, HA or recent seizure. He is on Keppra 1g BID. He denies other systemic illness and any other complaints.

## 2018-03-13 NOTE — H&P ADULT - PROBLEM SELECTOR PLAN 1
- For OR today  - MRI queenie done  - NPO   - Consent in chart.   - Medical clearance in chart  - IVF  - Patient took 1 g Keppra this morning  - No AC on board.     d/w Dr. Mason

## 2018-03-13 NOTE — H&P ADULT - PROBLEM SELECTOR PLAN 2
Continue home medications  - Will advise patient to bring Cabergoline (home medication) as it is not available

## 2018-03-14 LAB
ANION GAP SERPL CALC-SCNC: 12 MMOL/L — SIGNIFICANT CHANGE UP (ref 5–17)
BUN SERPL-MCNC: 24 MG/DL — HIGH (ref 7–23)
CALCIUM SERPL-MCNC: 8.8 MG/DL — SIGNIFICANT CHANGE UP (ref 8.4–10.5)
CHLORIDE SERPL-SCNC: 101 MMOL/L — SIGNIFICANT CHANGE UP (ref 96–108)
CO2 SERPL-SCNC: 25 MMOL/L — SIGNIFICANT CHANGE UP (ref 22–31)
CREAT SERPL-MCNC: 1.28 MG/DL — SIGNIFICANT CHANGE UP (ref 0.5–1.3)
GLUCOSE BLDC GLUCOMTR-MCNC: 132 MG/DL — HIGH (ref 70–99)
GLUCOSE BLDC GLUCOMTR-MCNC: 141 MG/DL — HIGH (ref 70–99)
GLUCOSE BLDC GLUCOMTR-MCNC: 144 MG/DL — HIGH (ref 70–99)
GLUCOSE BLDC GLUCOMTR-MCNC: 160 MG/DL — HIGH (ref 70–99)
GLUCOSE BLDC GLUCOMTR-MCNC: 162 MG/DL — HIGH (ref 70–99)
GLUCOSE SERPL-MCNC: 154 MG/DL — HIGH (ref 70–99)
HBA1C BLD-MCNC: 5.1 % — SIGNIFICANT CHANGE UP (ref 4–5.6)
HCT VFR BLD CALC: 38.9 % — LOW (ref 39–50)
HGB BLD-MCNC: 12.6 G/DL — LOW (ref 13–17)
MAGNESIUM SERPL-MCNC: 2 MG/DL — SIGNIFICANT CHANGE UP (ref 1.6–2.6)
MCHC RBC-ENTMCNC: 29.9 PG — SIGNIFICANT CHANGE UP (ref 27–34)
MCHC RBC-ENTMCNC: 32.4 G/DL — SIGNIFICANT CHANGE UP (ref 32–36)
MCV RBC AUTO: 92.2 FL — SIGNIFICANT CHANGE UP (ref 80–100)
PHOSPHATE SERPL-MCNC: 2.6 MG/DL — SIGNIFICANT CHANGE UP (ref 2.5–4.5)
PLATELET # BLD AUTO: 217 K/UL — SIGNIFICANT CHANGE UP (ref 150–400)
POTASSIUM SERPL-MCNC: 4.4 MMOL/L — SIGNIFICANT CHANGE UP (ref 3.5–5.3)
POTASSIUM SERPL-SCNC: 4.4 MMOL/L — SIGNIFICANT CHANGE UP (ref 3.5–5.3)
RBC # BLD: 4.22 M/UL — SIGNIFICANT CHANGE UP (ref 4.2–5.8)
RBC # FLD: 14.6 % — SIGNIFICANT CHANGE UP (ref 10.3–16.9)
SODIUM SERPL-SCNC: 138 MMOL/L — SIGNIFICANT CHANGE UP (ref 135–145)
WBC # BLD: 13.6 K/UL — HIGH (ref 3.8–10.5)
WBC # FLD AUTO: 13.6 K/UL — HIGH (ref 3.8–10.5)

## 2018-03-14 RX ORDER — OXYCODONE AND ACETAMINOPHEN 5; 325 MG/1; MG/1
2 TABLET ORAL EVERY 6 HOURS
Qty: 0 | Refills: 0 | Status: DISCONTINUED | OUTPATIENT
Start: 2018-03-14 | End: 2018-03-20

## 2018-03-14 RX ORDER — OXYCODONE AND ACETAMINOPHEN 5; 325 MG/1; MG/1
1 TABLET ORAL EVERY 4 HOURS
Qty: 0 | Refills: 0 | Status: DISCONTINUED | OUTPATIENT
Start: 2018-03-14 | End: 2018-03-20

## 2018-03-14 RX ORDER — ACETAMINOPHEN 500 MG
650 TABLET ORAL EVERY 6 HOURS
Qty: 0 | Refills: 0 | Status: DISCONTINUED | OUTPATIENT
Start: 2018-03-14 | End: 2018-03-20

## 2018-03-14 RX ADMIN — Medication 1000 MILLIGRAM(S): at 08:38

## 2018-03-14 RX ADMIN — SENNA PLUS 1 TABLET(S): 8.6 TABLET ORAL at 22:16

## 2018-03-14 RX ADMIN — LAMOTRIGINE 25 MILLIGRAM(S): 25 TABLET, ORALLY DISINTEGRATING ORAL at 11:29

## 2018-03-14 RX ADMIN — LEVETIRACETAM 1000 MILLIGRAM(S): 250 TABLET, FILM COATED ORAL at 11:33

## 2018-03-14 RX ADMIN — Medication 2: at 11:14

## 2018-03-14 RX ADMIN — Medication 60 MILLIGRAM(S): at 11:27

## 2018-03-14 RX ADMIN — Medication 1000 MILLIGRAM(S): at 01:30

## 2018-03-14 RX ADMIN — RISPERIDONE 0.5 MILLIGRAM(S): 4 TABLET ORAL at 08:04

## 2018-03-14 RX ADMIN — Medication 4 MILLIGRAM(S): at 01:30

## 2018-03-14 RX ADMIN — OXYCODONE AND ACETAMINOPHEN 2 TABLET(S): 5; 325 TABLET ORAL at 15:06

## 2018-03-14 RX ADMIN — ARIPIPRAZOLE 15 MILLIGRAM(S): 15 TABLET ORAL at 11:25

## 2018-03-14 RX ADMIN — Medication 4 MILLIGRAM(S): at 17:56

## 2018-03-14 RX ADMIN — Medication 100 MILLIGRAM(S): at 22:16

## 2018-03-14 RX ADMIN — OXYCODONE AND ACETAMINOPHEN 2 TABLET(S): 5; 325 TABLET ORAL at 16:00

## 2018-03-14 RX ADMIN — Medication 4 MILLIGRAM(S): at 08:14

## 2018-03-14 RX ADMIN — Medication 650 MILLIGRAM(S): at 08:25

## 2018-03-14 RX ADMIN — Medication 100 MILLIGRAM(S): at 14:04

## 2018-03-14 RX ADMIN — LEVETIRACETAM 1000 MILLIGRAM(S): 250 TABLET, FILM COATED ORAL at 22:16

## 2018-03-14 RX ADMIN — RISPERIDONE 0.5 MILLIGRAM(S): 4 TABLET ORAL at 17:56

## 2018-03-14 RX ADMIN — SODIUM CHLORIDE 80 MILLILITER(S): 9 INJECTION INTRAMUSCULAR; INTRAVENOUS; SUBCUTANEOUS at 18:04

## 2018-03-14 RX ADMIN — Medication 4 MILLIGRAM(S): at 22:16

## 2018-03-14 RX ADMIN — PANTOPRAZOLE SODIUM 40 MILLIGRAM(S): 20 TABLET, DELAYED RELEASE ORAL at 10:46

## 2018-03-14 NOTE — OCCUPATIONAL THERAPY INITIAL EVALUATION ADULT - STANDING BALANCE: DYNAMIC, REHAB EVAL
poor balance/patient ambulated approximately 50 feet with mod Ax2 bilateral hand held assist +multiple loss of balance bilaterally

## 2018-03-14 NOTE — OCCUPATIONAL THERAPY INITIAL EVALUATION ADULT - PERTINENT HX OF CURRENT PROBLEM, REHAB EVAL
Patient underwent left sided stereotactic craniotomy and gross total resection of brain tumor on 3/13/18.

## 2018-03-14 NOTE — PHYSICAL THERAPY INITIAL EVALUATION ADULT - MANUAL MUSCLE TESTING RESULTS, REHAB EVAL
however functional weakness and delayed activation with need for tactile and verbal cuing for RLE max activation/no strength deficits were identified

## 2018-03-14 NOTE — PHYSICAL THERAPY INITIAL EVALUATION ADULT - IMPAIRMENTS CONTRIBUTING TO GAIT DEVIATIONS, PT EVAL
impaired sensory feedback/decreased strength/impaired balance/impaired coordination/impaired motor control

## 2018-03-14 NOTE — PHYSICAL THERAPY INITIAL EVALUATION ADULT - DIAGNOSIS, PT EVAL
5D: Impaired Motor Function and Sensory Integrity Associated with Nonprogressive Disorders of the Central Nervous System—Acquired in Adolescence or Adulthood; 5A: Primary Prevention/Risk Reduction for Loss of Balance and Falling

## 2018-03-14 NOTE — PHYSICAL THERAPY INITIAL EVALUATION ADULT - COORDINATION ASSESSED, REHAB EVAL
finger to nose/finger to nose w/ increased shakiness with good accuracy; heel to shin not as smooth with right RLE on LLE/heel to shin finger to nose w/ increased shakiness with good accuracy; heel to shin not as smooth with right RLE on LLE/heel to shin/finger to nose

## 2018-03-14 NOTE — PHYSICAL THERAPY INITIAL EVALUATION ADULT - CRITERIA FOR SKILLED THERAPEUTIC INTERVENTIONS
anticipated discharge recommendation/therapy frequency/functional limitations in following categories/risk reduction/prevention/rehab potential/impairments found

## 2018-03-14 NOTE — OCCUPATIONAL THERAPY INITIAL EVALUATION ADULT - MD ORDER
Per chart, 53 yo M with PMHx seizure, GBM s/p resection, chemo, RT in 2017 p/w recurrent GBM. He was found to have recurrence on repeat MRI for which he presents today to undergo left craniotomy with tumor resection. He has no weakness, numbness, speech difficulties, HA or recent seizure. He is on Keppra 1g BID. He denies other systemic illness and any other complaints.

## 2018-03-14 NOTE — PHYSICAL THERAPY INITIAL EVALUATION ADULT - PERTINENT HX OF CURRENT PROBLEM, REHAB EVAL
52 year old male with recurrent GBM found to have recurrence upon MRI presenting for elective resection.

## 2018-03-14 NOTE — PROGRESS NOTE ADULT - SUBJECTIVE AND OBJECTIVE BOX
HPI:  53 yo M with PMHx seizure, GBM s/p resection, chemo, RT in 2017 p/w recurrent GBM. He was found to have recurrence on repeat MRI for which he presents today to undergo left craniotomy with tumor resection. He has no weakness, numbness, speech difficulties, HA or recent seizure. He is on Keppra 1g BID. He denies other systemic illness and any other complaints. (13 Mar 2018 07:47)    OVERNIGHT EVENTS: No issues overnight. c/o mild HA. Denies CP, SOB.     Hospital course:    3/13: OR for L crani for tumor resection  3/14: No issues overnight. Transfer to regional bed.      Vital Signs Last 24 Hrs  T(C): 37.1 (14 Mar 2018 11:39), Max: 37.1 (14 Mar 2018 11:39)  T(F): 98.7 (14 Mar 2018 11:39), Max: 98.7 (14 Mar 2018 11:39)  HR: 92 (14 Mar 2018 11:39) (76 - 100)  BP: 121/72 (14 Mar 2018 11:39) (93/48 - 133/77)  BP(mean): 89 (14 Mar 2018 10:01) (83 - 105)  RR: 18 (14 Mar 2018 11:39) (4 - 24)  SpO2: 98% (14 Mar 2018 11:39) (94% - 100%)    I&O's Summary    13 Mar 2018 07:01  -  14 Mar 2018 07:00  --------------------------------------------------------  IN: 1440 mL / OUT: 1420 mL / NET: 20 mL    14 Mar 2018 07:01  -  14 Mar 2018 12:15  --------------------------------------------------------  IN: 620 mL / OUT: 450 mL / NET: 170 mL        PHYSICAL EXAM:  Gen: Laying in hospital bed comfortably, NAD  Neurological: AA+Ox3, following commands, opens eyes spontaneously  CN II-XII grossly intact, PERRL, EOMI, face symmetric  Motor: MAEx4, Strength 5/5 throughout, no drift  SILT in UE and LE b/l   Cardiovascular: regular rate and rhythm  Respiratory: clear to auscultation  Incision/Wound: C/D/I    TUBES/LINES:  [] Lema  [] Lumbar Drain  [] Wound Drains  [] Others      DIET:  [] NPO  [x] Mechanical  [] Tube feeds    LABS:                        12.6   13.6  )-----------( 217      ( 14 Mar 2018 04:21 )             38.9     03-14    138  |  101  |  24<H>  ----------------------------<  154<H>  4.4   |  25  |  1.28    Ca    8.8      14 Mar 2018 04:21  Phos  2.6     03-14  Mg     2.0     03-14      PT/INR - ( 13 Mar 2018 14:05 )   PT: 10.4 sec;   INR: 0.94                  CAPILLARY BLOOD GLUCOSE      POCT Blood Glucose.: 162 mg/dL (14 Mar 2018 11:11)  POCT Blood Glucose.: 160 mg/dL (14 Mar 2018 11:09)  POCT Blood Glucose.: 132 mg/dL (14 Mar 2018 07:06)  POCT Blood Glucose.: 143 mg/dL (13 Mar 2018 21:05)  POCT Blood Glucose.: 129 mg/dL (13 Mar 2018 16:03)      Drug Levels: [] N/A    CSF Analysis: [] N/A      Allergies    No Known Allergies    Intolerances      MEDICATIONS:  Antibiotics:    Neuro:  acetaminophen   Tablet 650 milliGRAM(s) Oral every 6 hours PRN  acetaminophen   Tablet. 650 milliGRAM(s) Oral every 6 hours PRN  ARIPiprazole 15 milliGRAM(s) Oral daily  FLUoxetine 60 milliGRAM(s) Oral daily  lamoTRIgine 25 milliGRAM(s) Oral daily  levETIRAcetam 1000 milliGRAM(s) Oral every 12 hours  ondansetron Injectable 4 milliGRAM(s) IV Push every 6 hours PRN  risperiDONE   Tablet 0.5 milliGRAM(s) Oral two times a day    Anticoagulation:    OTHER:  dexamethasone  Injectable 4 milliGRAM(s) IV Push every 6 hours  dextrose 50% Injectable 12.5 Gram(s) IV Push once  dextrose Gel 1 Dose(s) Oral once PRN  docusate sodium 100 milliGRAM(s) Oral three times a day  glucagon  Injectable 1 milliGRAM(s) IntraMuscular once PRN  influenza   Vaccine 0.5 milliLiter(s) IntraMuscular once  insulin lispro (HumaLOG) corrective regimen sliding scale   SubCutaneous Before meals and at bedtime  labetalol Injectable 20 milliGRAM(s) IV Push once PRN  pantoprazole  Injectable 40 milliGRAM(s) IV Push daily  senna 1 Tablet(s) Oral at bedtime    IVF:  sodium chloride 0.9%. 1000 milliLiter(s) IV Continuous <Continuous>    CULTURES:    RADIOLOGY & ADDITIONAL TESTS:      ASSESSMENT:  52y Male s/p L crani for tumor resection POD #1    PLAN:  - Neuro checks Q3 hours  - pain control  - normotensive BP goals  - continue Keppra  - continue Decadron  - PPI (Protonix) while on Decadron  - bowel regimen: colace, senna  - advance diet as tolerated   - DVT ppx: SCD's    DVT PROPHYLAXIS:  [x] Venodynes                                [] Heparin/Lovenox    DISPOSITION: regional bed    d/w Dr. Mason HPI:  53 yo M with PMHx seizure, GBM s/p resection, chemo, RT in 2017 p/w recurrent GBM. He was found to have recurrence on repeat MRI for which he presents today to undergo left craniotomy with tumor resection. He has no weakness, numbness, speech difficulties, HA or recent seizure. He is on Keppra 1g BID. He denies other systemic illness and any other complaints. (13 Mar 2018 07:47)    OVERNIGHT EVENTS: No issues overnight. c/o mild HA. Denies CP, SOB.     Hospital course:    3/13: OR for L crani for tumor resection  3/14: No issues overnight. Transfer to regional bed.      Vital Signs Last 24 Hrs  T(C): 37.1 (14 Mar 2018 11:39), Max: 37.1 (14 Mar 2018 11:39)  T(F): 98.7 (14 Mar 2018 11:39), Max: 98.7 (14 Mar 2018 11:39)  HR: 92 (14 Mar 2018 11:39) (76 - 100)  BP: 121/72 (14 Mar 2018 11:39) (93/48 - 133/77)  BP(mean): 89 (14 Mar 2018 10:01) (83 - 105)  RR: 18 (14 Mar 2018 11:39) (4 - 24)  SpO2: 98% (14 Mar 2018 11:39) (94% - 100%)    I&O's Summary    13 Mar 2018 07:01  -  14 Mar 2018 07:00  --------------------------------------------------------  IN: 1440 mL / OUT: 1420 mL / NET: 20 mL    14 Mar 2018 07:01  -  14 Mar 2018 12:15  --------------------------------------------------------  IN: 620 mL / OUT: 450 mL / NET: 170 mL        PHYSICAL EXAM:  Gen: Laying in hospital bed comfortably, NAD  Neurological: AA+Ox3, following commands, opens eyes spontaneously  CN II-XII grossly intact, PERRL, EOMI, face symmetric  Motor: MAEx4, Strength 5/5 throughout, no drift  SILT in UE and LE b/l   Cardiovascular: regular rate and rhythm  Respiratory: clear to auscultation  Incision/Wound: C/D/I    TUBES/LINES:  [] Lema  [] Lumbar Drain  [] Wound Drains  [] Others      DIET:  [] NPO  [x] Mechanical  [] Tube feeds    LABS:                        12.6   13.6  )-----------( 217      ( 14 Mar 2018 04:21 )             38.9     03-14    138  |  101  |  24<H>  ----------------------------<  154<H>  4.4   |  25  |  1.28    Ca    8.8      14 Mar 2018 04:21  Phos  2.6     03-14  Mg     2.0     03-14      PT/INR - ( 13 Mar 2018 14:05 )   PT: 10.4 sec;   INR: 0.94                  CAPILLARY BLOOD GLUCOSE      POCT Blood Glucose.: 162 mg/dL (14 Mar 2018 11:11)  POCT Blood Glucose.: 160 mg/dL (14 Mar 2018 11:09)  POCT Blood Glucose.: 132 mg/dL (14 Mar 2018 07:06)  POCT Blood Glucose.: 143 mg/dL (13 Mar 2018 21:05)  POCT Blood Glucose.: 129 mg/dL (13 Mar 2018 16:03)      Drug Levels: [] N/A    CSF Analysis: [] N/A      Allergies    No Known Allergies    Intolerances      MEDICATIONS:  Antibiotics:    Neuro:  acetaminophen   Tablet 650 milliGRAM(s) Oral every 6 hours PRN  acetaminophen   Tablet. 650 milliGRAM(s) Oral every 6 hours PRN  ARIPiprazole 15 milliGRAM(s) Oral daily  FLUoxetine 60 milliGRAM(s) Oral daily  lamoTRIgine 25 milliGRAM(s) Oral daily  levETIRAcetam 1000 milliGRAM(s) Oral every 12 hours  ondansetron Injectable 4 milliGRAM(s) IV Push every 6 hours PRN  risperiDONE   Tablet 0.5 milliGRAM(s) Oral two times a day    Anticoagulation:    OTHER:  dexamethasone  Injectable 4 milliGRAM(s) IV Push every 6 hours  dextrose 50% Injectable 12.5 Gram(s) IV Push once  dextrose Gel 1 Dose(s) Oral once PRN  docusate sodium 100 milliGRAM(s) Oral three times a day  glucagon  Injectable 1 milliGRAM(s) IntraMuscular once PRN  influenza   Vaccine 0.5 milliLiter(s) IntraMuscular once  insulin lispro (HumaLOG) corrective regimen sliding scale   SubCutaneous Before meals and at bedtime  labetalol Injectable 20 milliGRAM(s) IV Push once PRN  pantoprazole  Injectable 40 milliGRAM(s) IV Push daily  senna 1 Tablet(s) Oral at bedtime    IVF:  sodium chloride 0.9%. 1000 milliLiter(s) IV Continuous <Continuous>    CULTURES:    RADIOLOGY & ADDITIONAL TESTS:      ASSESSMENT:  52y Male s/p L crani for tumor resection POD #1    PLAN:  - Neuro checks Q3 hours  - pain control  - normotensive BP goals  - continue Keppra  - continue Decadron  - PPI (Protonix) while on Decadron  - bowel regimen: colace, senna  - advance diet as tolerated   - DVT ppx: SCD's  - MRI head tomorrow    DVT PROPHYLAXIS:  [x] Venodynes                                [] Heparin/Lovenox    DISPOSITION: regional bed    d/w Dr. Mason

## 2018-03-14 NOTE — PHYSICAL THERAPY INITIAL EVALUATION ADULT - IMPAIRED TRANSFERS: SIT/STAND, REHAB EVAL
impaired balance/impaired sensory feedback/decreased strength/impaired motor control/impaired postural control

## 2018-03-14 NOTE — OCCUPATIONAL THERAPY INITIAL EVALUATION ADULT - PLANNED THERAPY INTERVENTIONS, OT EVAL
bed mobility training/IADL retraining/balance training/cognitive, visual perceptual/fine motor coordination training/transfer training/ADL retraining/motor coordination training

## 2018-03-14 NOTE — PHYSICAL THERAPY INITIAL EVALUATION ADULT - PLANNED THERAPY INTERVENTIONS, PT EVAL
neuromuscular re-education/gait training/motor coordination training/transfer training/balance training

## 2018-03-14 NOTE — OCCUPATIONAL THERAPY INITIAL EVALUATION ADULT - NS ASR FOLLOW COMMAND OT EVAL
able to follow single-step instructions/100% of the time/with verbal/tactile cues able to follow single-step instructions/100% of the time/with verbal/tactile cues. On 2/3 trials, reversed laterality during BLE sensation testing

## 2018-03-14 NOTE — PHYSICAL THERAPY INITIAL EVALUATION ADULT - GENERAL OBSERVATIONS, REHAB EVAL
Patient received in semi-ramsay position, no apparent distress, +intravenous line, +sequential pneumatic compression device.

## 2018-03-14 NOTE — OCCUPATIONAL THERAPY INITIAL EVALUATION ADULT - GENERAL OBSERVATIONS, REHAB EVAL
Right hand dominant. Chart reviewed, patient cleared for OT eval by neurosurgery NP Melissa. Received semi-supine, NAD, +SCDs, +IV, denies pain.

## 2018-03-15 LAB
GLUCOSE BLDC GLUCOMTR-MCNC: 116 MG/DL — HIGH (ref 70–99)
GLUCOSE BLDC GLUCOMTR-MCNC: 135 MG/DL — HIGH (ref 70–99)
GLUCOSE BLDC GLUCOMTR-MCNC: 153 MG/DL — HIGH (ref 70–99)
GLUCOSE BLDC GLUCOMTR-MCNC: 154 MG/DL — HIGH (ref 70–99)
GLUCOSE BLDC GLUCOMTR-MCNC: 181 MG/DL — HIGH (ref 70–99)
HCT VFR BLD CALC: 36.5 % — LOW (ref 39–50)
HGB BLD-MCNC: 11.5 G/DL — LOW (ref 13–17)
MCHC RBC-ENTMCNC: 29.9 PG — SIGNIFICANT CHANGE UP (ref 27–34)
MCHC RBC-ENTMCNC: 31.5 G/DL — LOW (ref 32–36)
MCV RBC AUTO: 95.1 FL — SIGNIFICANT CHANGE UP (ref 80–100)
PLATELET # BLD AUTO: 213 K/UL — SIGNIFICANT CHANGE UP (ref 150–400)
RBC # BLD: 3.84 M/UL — LOW (ref 4.2–5.8)
RBC # FLD: 14.8 % — SIGNIFICANT CHANGE UP (ref 10.3–16.9)
WBC # BLD: 14.7 K/UL — HIGH (ref 3.8–10.5)
WBC # FLD AUTO: 14.7 K/UL — HIGH (ref 3.8–10.5)

## 2018-03-15 RX ORDER — DEXAMETHASONE 0.5 MG/5ML
4 ELIXIR ORAL EVERY 8 HOURS
Qty: 0 | Refills: 0 | Status: DISCONTINUED | OUTPATIENT
Start: 2018-03-15 | End: 2018-03-16

## 2018-03-15 RX ADMIN — OXYCODONE AND ACETAMINOPHEN 1 TABLET(S): 5; 325 TABLET ORAL at 23:06

## 2018-03-15 RX ADMIN — Medication 60 MILLIGRAM(S): at 11:55

## 2018-03-15 RX ADMIN — OXYCODONE AND ACETAMINOPHEN 2 TABLET(S): 5; 325 TABLET ORAL at 06:39

## 2018-03-15 RX ADMIN — Medication 4 MILLIGRAM(S): at 05:51

## 2018-03-15 RX ADMIN — Medication 4 MILLIGRAM(S): at 23:14

## 2018-03-15 RX ADMIN — Medication 4 MILLIGRAM(S): at 11:52

## 2018-03-15 RX ADMIN — LEVETIRACETAM 1000 MILLIGRAM(S): 250 TABLET, FILM COATED ORAL at 23:05

## 2018-03-15 RX ADMIN — Medication 100 MILLIGRAM(S): at 05:51

## 2018-03-15 RX ADMIN — OXYCODONE AND ACETAMINOPHEN 2 TABLET(S): 5; 325 TABLET ORAL at 00:50

## 2018-03-15 RX ADMIN — Medication 2: at 17:18

## 2018-03-15 RX ADMIN — PANTOPRAZOLE SODIUM 40 MILLIGRAM(S): 20 TABLET, DELAYED RELEASE ORAL at 13:02

## 2018-03-15 RX ADMIN — RISPERIDONE 0.5 MILLIGRAM(S): 4 TABLET ORAL at 05:51

## 2018-03-15 RX ADMIN — Medication 62.5 MILLIMOLE(S): at 11:52

## 2018-03-15 RX ADMIN — RISPERIDONE 0.5 MILLIGRAM(S): 4 TABLET ORAL at 17:18

## 2018-03-15 RX ADMIN — OXYCODONE AND ACETAMINOPHEN 2 TABLET(S): 5; 325 TABLET ORAL at 00:04

## 2018-03-15 RX ADMIN — LAMOTRIGINE 25 MILLIGRAM(S): 25 TABLET, ORALLY DISINTEGRATING ORAL at 11:55

## 2018-03-15 RX ADMIN — Medication 2: at 12:24

## 2018-03-15 RX ADMIN — Medication 100 MILLIGRAM(S): at 23:05

## 2018-03-15 RX ADMIN — OXYCODONE AND ACETAMINOPHEN 2 TABLET(S): 5; 325 TABLET ORAL at 05:53

## 2018-03-15 RX ADMIN — Medication 4 MILLIGRAM(S): at 17:18

## 2018-03-15 RX ADMIN — ARIPIPRAZOLE 15 MILLIGRAM(S): 15 TABLET ORAL at 11:55

## 2018-03-15 RX ADMIN — Medication 100 MILLIGRAM(S): at 13:01

## 2018-03-15 RX ADMIN — SODIUM CHLORIDE 80 MILLILITER(S): 9 INJECTION INTRAMUSCULAR; INTRAVENOUS; SUBCUTANEOUS at 05:51

## 2018-03-15 RX ADMIN — SENNA PLUS 1 TABLET(S): 8.6 TABLET ORAL at 23:05

## 2018-03-15 RX ADMIN — LEVETIRACETAM 1000 MILLIGRAM(S): 250 TABLET, FILM COATED ORAL at 09:34

## 2018-03-15 RX ADMIN — ONDANSETRON 4 MILLIGRAM(S): 8 TABLET, FILM COATED ORAL at 09:53

## 2018-03-15 NOTE — PROGRESS NOTE ADULT - SUBJECTIVE AND OBJECTIVE BOX
HPI:  53 yo M with PMHx seizure, GBM s/p resection, chemo, RT in 2017 p/w recurrent GBM. He was found to have recurrence on repeat MRI for which he presents today to undergo left craniotomy with tumor resection. He has no weakness, numbness, speech difficulties, HA or recent seizure. He is on Keppra 1g BID. He denies other systemic illness and any other complaints. (13 Mar 2018 07:47)      OVERNIGHT EVENTS:   POD2 s/p L craniotomy for tumor resection - frozen: high grade glioma. Nurse reports pt has been impulsive and frequently tries to get out of bed to attempt to walk, but is very unstable and requires enhanced supervision. Pt states he is anxious and would like to get OOB and work with PT - denies impulsivity.   Denies acute changes to vision, weakness or loss of sensation.     Hospital Course:  3/13: OR for L crani for tumor resection  3/14: No issues overnight. Transfer to regional bed.  3/15: No issues overnight. Placed on enhanced obs for impulsivity getting OOB. PT/OT suggesting acute rehab    Vital Signs Last 24 Hrs  T(C): 36.6 (15 Mar 2018 09:23), Max: 37.1 (14 Mar 2018 11:39)  T(F): 97.8 (15 Mar 2018 09:23), Max: 98.7 (14 Mar 2018 11:39)  HR: 86 (15 Mar 2018 09:23) (69 - 92)  BP: 123/78 (15 Mar 2018 09:23) (106/68 - 123/78)  BP(mean): --  RR: 18 (15 Mar 2018 09:23) (17 - 18)  SpO2: 96% (15 Mar 2018 09:23) (96% - 98%)    I&O's Summary    14 Mar 2018 07:01  -  15 Mar 2018 07:00  --------------------------------------------------------  IN: 2360 mL / OUT: 1350 mL / NET: 1010 mL        PHYSICAL EXAM:  Neurological: AOx3, NAD, FC, speech coherent   CN II-XII: EOMI, PERRL, face symmetric, tongue midline   Motor: MAEx4 5/5 UE and LE B/L   SILT throughout  WWP throughout   No pronator drift   Scalp incision site: C/D/I staples in place, no erythema, edema or purulent drainage     Cardiovascular: regular rate   Respiratory: unlabored breathing on RA   Gastrointestinal: abd soft, NT, ND   Genitourinary: no ferguson in place   Extremities: no LE edema     TUBES/LINES:  [] Ferguson  [] Lumbar Drain  [] Wound Drains  [] Others       DIET:  [] NPO  [x] Mechanical  [] Tube feeds    LABS:                        12.6   13.6  )-----------( 217      ( 14 Mar 2018 04:21 )             38.9     03-14    138  |  101  |  24<H>  ----------------------------<  154<H>  4.4   |  25  |  1.28    Ca    8.8      14 Mar 2018 04:21  Phos  2.6     03-14  Mg     2.0     03-14      PT/INR - ( 13 Mar 2018 14:05 )   PT: 10.4 sec;   INR: 0.94                  CAPILLARY BLOOD GLUCOSE      POCT Blood Glucose.: 135 mg/dL (15 Mar 2018 06:24)  POCT Blood Glucose.: 144 mg/dL (14 Mar 2018 21:46)  POCT Blood Glucose.: 141 mg/dL (14 Mar 2018 17:48)  POCT Blood Glucose.: 162 mg/dL (14 Mar 2018 11:11)  POCT Blood Glucose.: 160 mg/dL (14 Mar 2018 11:09)      Drug Levels: [] N/A    CSF Analysis: [] N/A      Allergies    No Known Allergies    Intolerances      MEDICATIONS:  Antibiotics:    Neuro:  acetaminophen   Tablet 650 milliGRAM(s) Oral every 6 hours PRN  acetaminophen   Tablet. 650 milliGRAM(s) Oral every 6 hours PRN  ARIPiprazole 15 milliGRAM(s) Oral daily  FLUoxetine 60 milliGRAM(s) Oral daily  lamoTRIgine 25 milliGRAM(s) Oral daily  levETIRAcetam 1000 milliGRAM(s) Oral every 12 hours  ondansetron Injectable 4 milliGRAM(s) IV Push every 6 hours PRN  oxyCODONE    5 mG/acetaminophen 325 mG 1 Tablet(s) Oral every 4 hours PRN  oxyCODONE    5 mG/acetaminophen 325 mG 2 Tablet(s) Oral every 6 hours PRN  risperiDONE   Tablet 0.5 milliGRAM(s) Oral two times a day    Anticoagulation:    OTHER:  dexamethasone  Injectable 4 milliGRAM(s) IV Push every 6 hours  dextrose 50% Injectable 12.5 Gram(s) IV Push once  dextrose Gel 1 Dose(s) Oral once PRN  docusate sodium 100 milliGRAM(s) Oral three times a day  glucagon  Injectable 1 milliGRAM(s) IntraMuscular once PRN  insulin lispro (HumaLOG) corrective regimen sliding scale   SubCutaneous Before meals and at bedtime  labetalol Injectable 20 milliGRAM(s) IV Push once PRN  pantoprazole  Injectable 40 milliGRAM(s) IV Push daily  senna 1 Tablet(s) Oral at bedtime    IVF:    CULTURES:    RADIOLOGY & ADDITIONAL TESTS:      ASSESSMENT:  52y Male s/p L craniotomy for recurrent GBM resection.     C71.1  191.1  C71.1  191.1  No h/o HF  No pertinent family history in first degree relatives  Handoff  MEWS Score  Anxiety and depression  Seizure disorder  GBM (glioblastoma multiforme)  Brain tumor  GBM (glioblastoma multiforme)  Brain tumor  GBM (glioblastoma multiforme)  Seizure disorder  Anxiety and depression  GBM (glioblastoma multiforme)  Left sided craniotomy  History of craniotomy      PLAN:  NEURO:  - Cont Q4 neuro checks   - Cont decadron 4q6  - Cont Keppra 1g BID   - Cont. lamotrigine   - Elev HOB 30 deg   - Tylenol, percocet for pain    CARDIOVASCULAR:  - BP goal: normotension     PULMONARY:  - IS     RENAL:  - I&Os     GI:  - Bowel regimen   - Protonix   - Zofran    HEME:  - Trend H/H     ID:  - Monitor for fevers     ENDO:  - ISS   - Replete lytes: phosphate low today    PSYCH:   - Cont. prozac, risperidone and abilify     DVT PROPHYLAXIS:  [x] Venodynes                                [ ] Heparin/Lovenox    DISPOSITION:  - SDU status   - Full code   - Dispo: pending acute rehab   - D/w Dr. Mason

## 2018-03-16 LAB
ANION GAP SERPL CALC-SCNC: 10 MMOL/L — SIGNIFICANT CHANGE UP (ref 5–17)
BUN SERPL-MCNC: 23 MG/DL — SIGNIFICANT CHANGE UP (ref 7–23)
CALCIUM SERPL-MCNC: 8.8 MG/DL — SIGNIFICANT CHANGE UP (ref 8.4–10.5)
CHLORIDE SERPL-SCNC: 101 MMOL/L — SIGNIFICANT CHANGE UP (ref 96–108)
CO2 SERPL-SCNC: 26 MMOL/L — SIGNIFICANT CHANGE UP (ref 22–31)
CREAT SERPL-MCNC: 1.09 MG/DL — SIGNIFICANT CHANGE UP (ref 0.5–1.3)
GLUCOSE BLDC GLUCOMTR-MCNC: 113 MG/DL — HIGH (ref 70–99)
GLUCOSE BLDC GLUCOMTR-MCNC: 115 MG/DL — HIGH (ref 70–99)
GLUCOSE BLDC GLUCOMTR-MCNC: 119 MG/DL — HIGH (ref 70–99)
GLUCOSE BLDC GLUCOMTR-MCNC: 130 MG/DL — HIGH (ref 70–99)
GLUCOSE SERPL-MCNC: 130 MG/DL — HIGH (ref 70–99)
HCT VFR BLD CALC: 35 % — LOW (ref 39–50)
HGB BLD-MCNC: 11.3 G/DL — LOW (ref 13–17)
MAGNESIUM SERPL-MCNC: 2 MG/DL — SIGNIFICANT CHANGE UP (ref 1.6–2.6)
MCHC RBC-ENTMCNC: 29.9 PG — SIGNIFICANT CHANGE UP (ref 27–34)
MCHC RBC-ENTMCNC: 32.3 G/DL — SIGNIFICANT CHANGE UP (ref 32–36)
MCV RBC AUTO: 92.6 FL — SIGNIFICANT CHANGE UP (ref 80–100)
PHOSPHATE SERPL-MCNC: 3 MG/DL — SIGNIFICANT CHANGE UP (ref 2.5–4.5)
PLATELET # BLD AUTO: 205 K/UL — SIGNIFICANT CHANGE UP (ref 150–400)
POTASSIUM SERPL-MCNC: 4.3 MMOL/L — SIGNIFICANT CHANGE UP (ref 3.5–5.3)
POTASSIUM SERPL-SCNC: 4.3 MMOL/L — SIGNIFICANT CHANGE UP (ref 3.5–5.3)
RBC # BLD: 3.78 M/UL — LOW (ref 4.2–5.8)
RBC # FLD: 14.2 % — SIGNIFICANT CHANGE UP (ref 10.3–16.9)
SODIUM SERPL-SCNC: 137 MMOL/L — SIGNIFICANT CHANGE UP (ref 135–145)
SURGICAL PATHOLOGY STUDY: SIGNIFICANT CHANGE UP
WBC # BLD: 12.2 K/UL — HIGH (ref 3.8–10.5)
WBC # FLD AUTO: 12.2 K/UL — HIGH (ref 3.8–10.5)

## 2018-03-16 RX ORDER — DEXAMETHASONE 0.5 MG/5ML
ELIXIR ORAL
Qty: 0 | Refills: 0 | Status: DISCONTINUED | OUTPATIENT
Start: 2018-03-16 | End: 2018-03-20

## 2018-03-16 RX ORDER — DEXAMETHASONE 0.5 MG/5ML
2 ELIXIR ORAL EVERY 6 HOURS
Qty: 0 | Refills: 0 | Status: COMPLETED | OUTPATIENT
Start: 2018-03-18 | End: 2018-03-18

## 2018-03-16 RX ORDER — DEXAMETHASONE 0.5 MG/5ML
2 ELIXIR ORAL EVERY 12 HOURS
Qty: 0 | Refills: 0 | Status: DISCONTINUED | OUTPATIENT
Start: 2018-03-19 | End: 2018-03-20

## 2018-03-16 RX ORDER — LACTULOSE 10 G/15ML
30 SOLUTION ORAL ONCE
Qty: 0 | Refills: 0 | Status: COMPLETED | OUTPATIENT
Start: 2018-03-16 | End: 2018-03-16

## 2018-03-16 RX ORDER — DIPHENHYDRAMINE HCL 50 MG
50 CAPSULE ORAL ONCE
Qty: 0 | Refills: 0 | Status: COMPLETED | OUTPATIENT
Start: 2018-03-16 | End: 2018-03-16

## 2018-03-16 RX ORDER — ZOLPIDEM TARTRATE 10 MG/1
5 TABLET ORAL AT BEDTIME
Qty: 0 | Refills: 0 | Status: DISCONTINUED | OUTPATIENT
Start: 2018-03-16 | End: 2018-03-20

## 2018-03-16 RX ORDER — PANTOPRAZOLE SODIUM 20 MG/1
40 TABLET, DELAYED RELEASE ORAL DAILY
Qty: 0 | Refills: 0 | Status: DISCONTINUED | OUTPATIENT
Start: 2018-03-16 | End: 2018-03-20

## 2018-03-16 RX ORDER — DEXAMETHASONE 0.5 MG/5ML
4 ELIXIR ORAL EVERY 12 HOURS
Qty: 0 | Refills: 0 | Status: COMPLETED | OUTPATIENT
Start: 2018-03-16 | End: 2018-03-17

## 2018-03-16 RX ORDER — DEXAMETHASONE 0.5 MG/5ML
4 ELIXIR ORAL EVERY 12 HOURS
Qty: 0 | Refills: 0 | Status: DISCONTINUED | OUTPATIENT
Start: 2018-03-16 | End: 2018-03-16

## 2018-03-16 RX ADMIN — ARIPIPRAZOLE 15 MILLIGRAM(S): 15 TABLET ORAL at 12:41

## 2018-03-16 RX ADMIN — RISPERIDONE 0.5 MILLIGRAM(S): 4 TABLET ORAL at 18:15

## 2018-03-16 RX ADMIN — Medication 60 MILLIGRAM(S): at 12:41

## 2018-03-16 RX ADMIN — ZOLPIDEM TARTRATE 5 MILLIGRAM(S): 10 TABLET ORAL at 23:55

## 2018-03-16 RX ADMIN — Medication 50 MILLIGRAM(S): at 00:31

## 2018-03-16 RX ADMIN — LEVETIRACETAM 1000 MILLIGRAM(S): 250 TABLET, FILM COATED ORAL at 21:59

## 2018-03-16 RX ADMIN — Medication 4 MILLIGRAM(S): at 06:06

## 2018-03-16 RX ADMIN — RISPERIDONE 0.5 MILLIGRAM(S): 4 TABLET ORAL at 06:06

## 2018-03-16 RX ADMIN — PANTOPRAZOLE SODIUM 40 MILLIGRAM(S): 20 TABLET, DELAYED RELEASE ORAL at 12:41

## 2018-03-16 RX ADMIN — SENNA PLUS 1 TABLET(S): 8.6 TABLET ORAL at 21:59

## 2018-03-16 RX ADMIN — LEVETIRACETAM 1000 MILLIGRAM(S): 250 TABLET, FILM COATED ORAL at 12:41

## 2018-03-16 RX ADMIN — OXYCODONE AND ACETAMINOPHEN 1 TABLET(S): 5; 325 TABLET ORAL at 00:00

## 2018-03-16 RX ADMIN — Medication 100 MILLIGRAM(S): at 12:41

## 2018-03-16 RX ADMIN — LAMOTRIGINE 25 MILLIGRAM(S): 25 TABLET, ORALLY DISINTEGRATING ORAL at 12:41

## 2018-03-16 RX ADMIN — Medication 100 MILLIGRAM(S): at 21:59

## 2018-03-16 RX ADMIN — Medication 100 MILLIGRAM(S): at 06:06

## 2018-03-16 RX ADMIN — LACTULOSE 30 GRAM(S): 10 SOLUTION ORAL at 19:24

## 2018-03-16 RX ADMIN — Medication 4 MILLIGRAM(S): at 18:15

## 2018-03-16 NOTE — DIETITIAN INITIAL EVALUATION ADULT. - OTHER INFO
52y Male s/p Left Craniotomy recurrent GBM resection. Pt currently on regular diet and tolerating PO. Endorses good appetite, consuming >75% meals. Denies GI distress except constipation. Last BM was 5 days ago. Currently on bowel regimen, discussed constipation nutrition therapy. Reports  20lb wt loss since June 2017.  Discussed increased nutrient needs 2/2 catabolic illness and nutrient dense meals and snacks. Outpatient RD contact info provided for further questions and follow up. NKFA or dietary restrictions. Does not adhere to any particular diet outside of [facility]. Skin: surgical incision; GI constipation per flowsheet.

## 2018-03-16 NOTE — PROGRESS NOTE ADULT - SUBJECTIVE AND OBJECTIVE BOX
HPI:  51 yo M with PMHx seizure, GBM s/p resection, chemo, RT in 2017 p/w recurrent GBM. He was found to have recurrence on repeat MRI for which he presents today to undergo left craniotomy with tumor resection. He has no weakness, numbness, speech difficulties, HA or recent seizure. He is on Keppra 1g BID. He denies other systemic illness and any other complaints. (13 Mar 2018 07:47)    OVERNIGHT EVENTS:  Vital Signs Last 24 Hrs  T(C): 36.3 (16 Mar 2018 08:13), Max: 36.4 (16 Mar 2018 05:11)  T(F): 97.3 (16 Mar 2018 08:13), Max: 97.5 (16 Mar 2018 05:11)  HR: 64 (16 Mar 2018 08:13) (64 - 85)  BP: 124/84 (16 Mar 2018 08:13) (106/67 - 124/84)  BP(mean): --  RR: 17 (16 Mar 2018 08:13) (16 - 17)  SpO2: 97% (16 Mar 2018 08:13) (95% - 98%)    I&O's Summary    15 Mar 2018 07:01  -  16 Mar 2018 07:00  --------------------------------------------------------  IN: 1430 mL / OUT: 1000 mL / NET: 430 mL    16 Mar 2018 07:01  -  16 Mar 2018 09:34  --------------------------------------------------------  IN: 0 mL / OUT: 200 mL / NET: -200 mL        PHYSICAL EXAM:  Neurological:    PHYSICAL EXAM:  Neurological: AOx3, NAD, FC, speech coherent   CN II-XII: EOMI, PERRL, face symmetric, tongue midline   Motor: MAEx4 5/5 UE and LE B/L         Cardiovascular:  Respiratory:  Gastrointestinal:  Genitourinary:  Extremities:  Incision/Wound:    TUBES/LINES:  [] Lema  [] Lumbar Drain  [] Wound Drains  [] Others      DIET:  [] NPO  [] Mechanical  [] Tube feeds    LABS:                        11.3   12.2  )-----------( 205      ( 16 Mar 2018 06:55 )             35.0     03-16    137  |  101  |  23  ----------------------------<  130<H>  4.3   |  26  |  1.09    Ca    8.8      16 Mar 2018 06:54  Phos  3.0     03-16  Mg     2.0     03-16              CAPILLARY BLOOD GLUCOSE      POCT Blood Glucose.: 115 mg/dL (16 Mar 2018 05:37)  POCT Blood Glucose.: 116 mg/dL (15 Mar 2018 23:14)  POCT Blood Glucose.: 154 mg/dL (15 Mar 2018 21:50)  POCT Blood Glucose.: 153 mg/dL (15 Mar 2018 16:58)  POCT Blood Glucose.: 181 mg/dL (15 Mar 2018 11:46)      Drug Levels: [] N/A    CSF Analysis: [] N/A      Allergies    No Known Allergies    Intolerances      MEDICATIONS:  Antibiotics:    Neuro:  acetaminophen   Tablet 650 milliGRAM(s) Oral every 6 hours PRN  acetaminophen   Tablet. 650 milliGRAM(s) Oral every 6 hours PRN  ARIPiprazole 15 milliGRAM(s) Oral daily  FLUoxetine 60 milliGRAM(s) Oral daily  lamoTRIgine 25 milliGRAM(s) Oral daily  levETIRAcetam 1000 milliGRAM(s) Oral every 12 hours  ondansetron Injectable 4 milliGRAM(s) IV Push every 6 hours PRN  oxyCODONE    5 mG/acetaminophen 325 mG 1 Tablet(s) Oral every 4 hours PRN  oxyCODONE    5 mG/acetaminophen 325 mG 2 Tablet(s) Oral every 6 hours PRN  risperiDONE   Tablet 0.5 milliGRAM(s) Oral two times a day    Anticoagulation:    OTHER:  dexamethasone     Tablet 4 milliGRAM(s) Oral every 12 hours  dextrose 50% Injectable 12.5 Gram(s) IV Push once  dextrose Gel 1 Dose(s) Oral once PRN  docusate sodium 100 milliGRAM(s) Oral three times a day  glucagon  Injectable 1 milliGRAM(s) IntraMuscular once PRN  insulin lispro (HumaLOG) corrective regimen sliding scale   SubCutaneous Before meals and at bedtime  labetalol Injectable 20 milliGRAM(s) IV Push once PRN  pantoprazole    Tablet 40 milliGRAM(s) Oral daily  senna 1 Tablet(s) Oral at bedtime    IVF:    CULTURES:    RADIOLOGY & ADDITIONAL TESTS:      ASSESSMENT:  52y Male s/p Left Craniotomy recurrent GBM resection without complications    PLAN:    NEURO:    Monitor neuro status  OT/PT  Steroid taper down to 2mg bid  F/U MRI Head  Continue Keppra  Continue current medical regime    Dispo: Discussed with attending

## 2018-03-16 NOTE — DIETITIAN INITIAL EVALUATION ADULT. - ENERGY NEEDS
Height: 6'6" Weight: 219lbs, IBW 214lbs+/-10%, %%, BMI 25.3  ABW used for calculations as pt between % of IBW.   Nutrient needs based on [facility] standards of care for maintenance in adults.   Needs adjusted 2/2 post-op and catabolic illness

## 2018-03-17 LAB
GLUCOSE BLDC GLUCOMTR-MCNC: 114 MG/DL — HIGH (ref 70–99)
GLUCOSE BLDC GLUCOMTR-MCNC: 142 MG/DL — HIGH (ref 70–99)
GLUCOSE BLDC GLUCOMTR-MCNC: 91 MG/DL — SIGNIFICANT CHANGE UP (ref 70–99)
GLUCOSE BLDC GLUCOMTR-MCNC: 96 MG/DL — SIGNIFICANT CHANGE UP (ref 70–99)

## 2018-03-17 PROCEDURE — 93970 EXTREMITY STUDY: CPT | Mod: 26

## 2018-03-17 RX ORDER — ENOXAPARIN SODIUM 100 MG/ML
40 INJECTION SUBCUTANEOUS AT BEDTIME
Qty: 0 | Refills: 0 | Status: DISCONTINUED | OUTPATIENT
Start: 2018-03-17 | End: 2018-03-20

## 2018-03-17 RX ADMIN — RISPERIDONE 0.5 MILLIGRAM(S): 4 TABLET ORAL at 08:31

## 2018-03-17 RX ADMIN — Medication 4 MILLIGRAM(S): at 21:47

## 2018-03-17 RX ADMIN — ZOLPIDEM TARTRATE 5 MILLIGRAM(S): 10 TABLET ORAL at 23:56

## 2018-03-17 RX ADMIN — LAMOTRIGINE 25 MILLIGRAM(S): 25 TABLET, ORALLY DISINTEGRATING ORAL at 12:24

## 2018-03-17 RX ADMIN — Medication 60 MILLIGRAM(S): at 12:24

## 2018-03-17 RX ADMIN — Medication 4 MILLIGRAM(S): at 06:05

## 2018-03-17 RX ADMIN — SENNA PLUS 1 TABLET(S): 8.6 TABLET ORAL at 21:47

## 2018-03-17 RX ADMIN — Medication 100 MILLIGRAM(S): at 06:05

## 2018-03-17 RX ADMIN — LEVETIRACETAM 1000 MILLIGRAM(S): 250 TABLET, FILM COATED ORAL at 12:24

## 2018-03-17 RX ADMIN — RISPERIDONE 0.5 MILLIGRAM(S): 4 TABLET ORAL at 21:47

## 2018-03-17 RX ADMIN — LEVETIRACETAM 1000 MILLIGRAM(S): 250 TABLET, FILM COATED ORAL at 21:47

## 2018-03-17 RX ADMIN — ARIPIPRAZOLE 15 MILLIGRAM(S): 15 TABLET ORAL at 12:24

## 2018-03-17 RX ADMIN — Medication 100 MILLIGRAM(S): at 21:47

## 2018-03-17 RX ADMIN — Medication 100 MILLIGRAM(S): at 12:24

## 2018-03-17 RX ADMIN — PANTOPRAZOLE SODIUM 40 MILLIGRAM(S): 20 TABLET, DELAYED RELEASE ORAL at 12:24

## 2018-03-17 NOTE — PROGRESS NOTE ADULT - SUBJECTIVE AND OBJECTIVE BOX
HPI:  51 yo M with PMHx seizure, GBM s/p resection, chemo, RT in 2017 p/w recurrent GBM. He was found to have recurrence on repeat MRI for which he presents today to undergo left craniotomy with tumor resection. He has no weakness, numbness, speech difficulties, HA or recent seizure. He is on Keppra 1g BID. He denies other systemic illness and any other complaints. (13 Mar 2018 07:47)      OVERNIGHT EVENTS: no acute events overnight. Afebrile. Reports b/l LE tingling from the knee down, improves with standing and walking. Denies calf pain. Was able to sleep better last night d/t ambien.   POD4 s/p left sided craniotomy for tumor resection on 3/13/18 d/t recurrent GBM, Frozen: high grade glioma.   Denies acute changes to vision, weakness or loss of sensation.     Vital Signs Last 24 Hrs  T(C): 36.3 (17 Mar 2018 09:34), Max: 36.7 (16 Mar 2018 21:49)  T(F): 97.4 (17 Mar 2018 09:34), Max: 98.1 (16 Mar 2018 21:49)  HR: 87 (17 Mar 2018 09:34) (66 - 87)  BP: 119/78 (17 Mar 2018 09:34) (119/78 - 143/90)  BP(mean): --  RR: 17 (17 Mar 2018 09:34) (16 - 18)  SpO2: 99% (17 Mar 2018 09:34) (95% - 100%)    I&O's Summary    16 Mar 2018 07:01  -  17 Mar 2018 07:00  --------------------------------------------------------  IN: 1240 mL / OUT: 1750 mL / NET: -510 mL        PHYSICAL EXAM:  Neurological: AOx3, NAD, FC, speech coherent   CN II-XII: EOMI, PERRL, face symmetric, tongue midline   Motor: MAEx4 5/5 UE and LE B/L   SILT throughout  WWP throughout   No pronator drift   Scalp incision site: C/D/I staples in place, no erythema, edema or purulent drainage     Cardiovascular: regular rate   Respiratory: unlabored breathing on RA   Gastrointestinal: abd soft, NT, ND   Genitourinary: no ferguson in place   Extremities: no LE edema     TUBES/LINES:  [] Ferguson  [] Lumbar Drain  [] Wound Drains  [] Others       DIET:  [] NPO  [x] Mechanical  [] Tube feeds    LABS:                        11.3   12.2  )-----------( 205      ( 16 Mar 2018 06:55 )             35.0     03-16    137  |  101  |  23  ----------------------------<  130<H>  4.3   |  26  |  1.09    Ca    8.8      16 Mar 2018 06:54  Phos  3.0     03-16  Mg     2.0     03-16              CAPILLARY BLOOD GLUCOSE      POCT Blood Glucose.: 96 mg/dL (17 Mar 2018 06:52)  POCT Blood Glucose.: 130 mg/dL (16 Mar 2018 21:37)  POCT Blood Glucose.: 119 mg/dL (16 Mar 2018 16:39)  POCT Blood Glucose.: 113 mg/dL (16 Mar 2018 12:01)      Drug Levels: [] N/A    CSF Analysis: [] N/A      Allergies    No Known Allergies    Intolerances      MEDICATIONS:  Antibiotics:    Neuro:  acetaminophen   Tablet 650 milliGRAM(s) Oral every 6 hours PRN  acetaminophen   Tablet. 650 milliGRAM(s) Oral every 6 hours PRN  ARIPiprazole 15 milliGRAM(s) Oral daily  FLUoxetine 60 milliGRAM(s) Oral daily  lamoTRIgine 25 milliGRAM(s) Oral daily  levETIRAcetam 1000 milliGRAM(s) Oral every 12 hours  ondansetron Injectable 4 milliGRAM(s) IV Push every 6 hours PRN  oxyCODONE    5 mG/acetaminophen 325 mG 1 Tablet(s) Oral every 4 hours PRN  oxyCODONE    5 mG/acetaminophen 325 mG 2 Tablet(s) Oral every 6 hours PRN  risperiDONE   Tablet 0.5 milliGRAM(s) Oral two times a day  zolpidem 5 milliGRAM(s) Oral at bedtime PRN    Anticoagulation:  enoxaparin Injectable 40 milliGRAM(s) SubCutaneous at bedtime    OTHER:  dexamethasone     Tablet   Oral   dexamethasone     Tablet 4 milliGRAM(s) Oral every 12 hours  dextrose 50% Injectable 12.5 Gram(s) IV Push once  dextrose Gel 1 Dose(s) Oral once PRN  docusate sodium 100 milliGRAM(s) Oral three times a day  glucagon  Injectable 1 milliGRAM(s) IntraMuscular once PRN  insulin lispro (HumaLOG) corrective regimen sliding scale   SubCutaneous Before meals and at bedtime  labetalol Injectable 20 milliGRAM(s) IV Push once PRN  pantoprazole    Tablet 40 milliGRAM(s) Oral daily  senna 1 Tablet(s) Oral at bedtime    IVF:    CULTURES:    RADIOLOGY & ADDITIONAL TESTS:      ASSESSMENT:  52y Male s/p    C71.1  191.1  C71.1  191.1  No h/o HF  No pertinent family history in first degree relatives  Handoff  MEWS Score  Anxiety and depression  Seizure disorder  GBM (glioblastoma multiforme)  Brain tumor  GBM (glioblastoma multiforme)  Brain tumor  GBM (glioblastoma multiforme)  Seizure disorder  Anxiety and depression  GBM (glioblastoma multiforme)  Left sided craniotomy  History of craniotomy      PLAN:  NEURO:  - Cont Q4 neuro checks   - Cont decadron   - Cont Keppra   - Elev HOB 30 deg   - Dc staples ___    CARDIOVASCULAR:  - BP goal: normotension     PULMONARY:  - IS     RENAL:  - I&Os     GI:  - Bowel regimen     HEME:  - Trend H/H     ID:  - Monitor for fevers     ENDO:  - ISS     DVT PROPHYLAXIS:  [] Venodynes                                [x] Heparin/Lovenox    DISPOSITION:  - SDU status   - Full code   - Dispo: pending placement   - D/w  HPI:  51 yo M with PMHx seizure, GBM s/p resection, chemo, RT in 2017 p/w recurrent GBM. He was found to have recurrence on repeat MRI for which he presents today to undergo left craniotomy with tumor resection. He has no weakness, numbness, speech difficulties, HA or recent seizure. He is on Keppra 1g BID. He denies other systemic illness and any other complaints. (13 Mar 2018 07:47)      OVERNIGHT EVENTS: no acute events overnight. Afebrile. Reports b/l LE tingling from the knee down, improves with standing and walking. Denies calf pain. Was able to sleep better last night d/t ambien. Reports improving instability while walking, PT recommended for acute rehab.    POD4 s/p left sided craniotomy for tumor resection on 3/13/18 d/t recurrent GBM, Frozen: high grade glioma.   Denies acute changes to vision, weakness or loss of sensation.     Vital Signs Last 24 Hrs  T(C): 36.3 (17 Mar 2018 09:34), Max: 36.7 (16 Mar 2018 21:49)  T(F): 97.4 (17 Mar 2018 09:34), Max: 98.1 (16 Mar 2018 21:49)  HR: 87 (17 Mar 2018 09:34) (66 - 87)  BP: 119/78 (17 Mar 2018 09:34) (119/78 - 143/90)  BP(mean): --  RR: 17 (17 Mar 2018 09:34) (16 - 18)  SpO2: 99% (17 Mar 2018 09:34) (95% - 100%)    I&O's Summary    16 Mar 2018 07:01  -  17 Mar 2018 07:00  --------------------------------------------------------  IN: 1240 mL / OUT: 1750 mL / NET: -510 mL        PHYSICAL EXAM:  Neurological: AOx3, NAD, FC, speech coherent   CN II-XII: EOMI, PERRL, face symmetric, tongue midline   Motor: MAEx4 5/5 UE and LE B/L   SILT throughout  WWP throughout   No pronator drift   Scalp incision site: C/D/I staples in place, no erythema, edema or purulent drainage     Cardiovascular: regular rate   Respiratory: unlabored breathing on RA   Gastrointestinal: abd soft, NT, ND   Genitourinary: no ferguson in place   Extremities: no LE edema     TUBES/LINES:  [] Ferguson  [] Lumbar Drain  [] Wound Drains  [] Others       DIET:  [] NPO  [x] Mechanical  [] Tube feeds    LABS:                        11.3   12.2  )-----------( 205      ( 16 Mar 2018 06:55 )             35.0     03-16    137  |  101  |  23  ----------------------------<  130<H>  4.3   |  26  |  1.09    Ca    8.8      16 Mar 2018 06:54  Phos  3.0     03-16  Mg     2.0     03-16              CAPILLARY BLOOD GLUCOSE      POCT Blood Glucose.: 96 mg/dL (17 Mar 2018 06:52)  POCT Blood Glucose.: 130 mg/dL (16 Mar 2018 21:37)  POCT Blood Glucose.: 119 mg/dL (16 Mar 2018 16:39)  POCT Blood Glucose.: 113 mg/dL (16 Mar 2018 12:01)      Drug Levels: [] N/A    CSF Analysis: [] N/A      Allergies    No Known Allergies    Intolerances      MEDICATIONS:  Antibiotics:    Neuro:  acetaminophen   Tablet 650 milliGRAM(s) Oral every 6 hours PRN  acetaminophen   Tablet. 650 milliGRAM(s) Oral every 6 hours PRN  ARIPiprazole 15 milliGRAM(s) Oral daily  FLUoxetine 60 milliGRAM(s) Oral daily  lamoTRIgine 25 milliGRAM(s) Oral daily  levETIRAcetam 1000 milliGRAM(s) Oral every 12 hours  ondansetron Injectable 4 milliGRAM(s) IV Push every 6 hours PRN  oxyCODONE    5 mG/acetaminophen 325 mG 1 Tablet(s) Oral every 4 hours PRN  oxyCODONE    5 mG/acetaminophen 325 mG 2 Tablet(s) Oral every 6 hours PRN  risperiDONE   Tablet 0.5 milliGRAM(s) Oral two times a day  zolpidem 5 milliGRAM(s) Oral at bedtime PRN    Anticoagulation:  enoxaparin Injectable 40 milliGRAM(s) SubCutaneous at bedtime    OTHER:  dexamethasone     Tablet   Oral   dexamethasone     Tablet 4 milliGRAM(s) Oral every 12 hours  dextrose 50% Injectable 12.5 Gram(s) IV Push once  dextrose Gel 1 Dose(s) Oral once PRN  docusate sodium 100 milliGRAM(s) Oral three times a day  glucagon  Injectable 1 milliGRAM(s) IntraMuscular once PRN  insulin lispro (HumaLOG) corrective regimen sliding scale   SubCutaneous Before meals and at bedtime  labetalol Injectable 20 milliGRAM(s) IV Push once PRN  pantoprazole    Tablet 40 milliGRAM(s) Oral daily  senna 1 Tablet(s) Oral at bedtime    IVF:    CULTURES:    RADIOLOGY & ADDITIONAL TESTS:      ASSESSMENT:  52y Male POD4 s/p Left Craniotomy for recurrent GBM resection without complications      C71.1  191.1  C71.1  191.1  No h/o HF  No pertinent family history in first degree relatives  Handoff  MEWS Score  Anxiety and depression  Seizure disorder  GBM (glioblastoma multiforme)  Brain tumor  GBM (glioblastoma multiforme)  Brain tumor  GBM (glioblastoma multiforme)  Seizure disorder  Anxiety and depression  GBM (glioblastoma multiforme)  Left sided craniotomy  History of craniotomy      PLAN:  NEURO:  - Cont Q4 neuro checks   - Cont decadron taper to 2mg BID  - Cont Keppra 1g q12   - Elev HOB 30 deg   - Pending acute rehab  - Percocet for pain prn     CARDIOVASCULAR:  - BP goal: normotension     PULMONARY:  - IS     RENAL:  - I&Os     GI:  - Reg diet   - Bowel regimen   - Pantoprazole    HEME:  - Trend H/H     ID:  - Monitor for fevers   - F/u b/l LE dopplers     ENDO:  - ISS     PSYCH:   - Ambien QHS, abilify, prozac, lamictal, risperdal    DVT PROPHYLAXIS:  [x] Venodynes                                [x] Lovenox    DISPOSITION:  - Floor status   - Full code   - Dispo: pending acute rehab placement   - D/w Dr. Acosta

## 2018-03-18 LAB
GLUCOSE BLDC GLUCOMTR-MCNC: 123 MG/DL — HIGH (ref 70–99)
GLUCOSE BLDC GLUCOMTR-MCNC: 148 MG/DL — HIGH (ref 70–99)
GLUCOSE BLDC GLUCOMTR-MCNC: 170 MG/DL — HIGH (ref 70–99)

## 2018-03-18 PROCEDURE — 70552 MRI BRAIN STEM W/DYE: CPT | Mod: 26

## 2018-03-18 RX ADMIN — Medication 100 MILLIGRAM(S): at 21:00

## 2018-03-18 RX ADMIN — OXYCODONE AND ACETAMINOPHEN 2 TABLET(S): 5; 325 TABLET ORAL at 06:46

## 2018-03-18 RX ADMIN — RISPERIDONE 0.5 MILLIGRAM(S): 4 TABLET ORAL at 05:50

## 2018-03-18 RX ADMIN — PANTOPRAZOLE SODIUM 40 MILLIGRAM(S): 20 TABLET, DELAYED RELEASE ORAL at 12:18

## 2018-03-18 RX ADMIN — SENNA PLUS 1 TABLET(S): 8.6 TABLET ORAL at 21:00

## 2018-03-18 RX ADMIN — OXYCODONE AND ACETAMINOPHEN 2 TABLET(S): 5; 325 TABLET ORAL at 06:19

## 2018-03-18 RX ADMIN — LEVETIRACETAM 1000 MILLIGRAM(S): 250 TABLET, FILM COATED ORAL at 21:00

## 2018-03-18 RX ADMIN — Medication 100 MILLIGRAM(S): at 12:18

## 2018-03-18 RX ADMIN — Medication 2 MILLIGRAM(S): at 05:50

## 2018-03-18 RX ADMIN — ARIPIPRAZOLE 15 MILLIGRAM(S): 15 TABLET ORAL at 12:17

## 2018-03-18 RX ADMIN — Medication 2 MILLIGRAM(S): at 21:00

## 2018-03-18 RX ADMIN — RISPERIDONE 0.5 MILLIGRAM(S): 4 TABLET ORAL at 21:00

## 2018-03-18 RX ADMIN — LAMOTRIGINE 25 MILLIGRAM(S): 25 TABLET, ORALLY DISINTEGRATING ORAL at 12:17

## 2018-03-18 RX ADMIN — Medication 60 MILLIGRAM(S): at 12:18

## 2018-03-18 RX ADMIN — OXYCODONE AND ACETAMINOPHEN 2 TABLET(S): 5; 325 TABLET ORAL at 22:00

## 2018-03-18 RX ADMIN — LEVETIRACETAM 1000 MILLIGRAM(S): 250 TABLET, FILM COATED ORAL at 12:18

## 2018-03-18 RX ADMIN — Medication 2 MILLIGRAM(S): at 12:18

## 2018-03-18 RX ADMIN — Medication 100 MILLIGRAM(S): at 05:50

## 2018-03-18 RX ADMIN — OXYCODONE AND ACETAMINOPHEN 2 TABLET(S): 5; 325 TABLET ORAL at 21:05

## 2018-03-18 NOTE — PROGRESS NOTE ADULT - SUBJECTIVE AND OBJECTIVE BOX
Pt was examined at the bedside, no significant events overnight, pt denies sob, cp, n/v    ICU Vital Signs Last 24 Hrs  T(C): 36.8 (18 Mar 2018 05:00), Max: 36.8 (18 Mar 2018 05:00)  T(F): 98.2 (18 Mar 2018 05:00), Max: 98.2 (18 Mar 2018 05:00)  HR: 76 (18 Mar 2018 05:00) (67 - 87)  BP: 102/68 (18 Mar 2018 05:00) (102/68 - 146/94)  BP(mean): --  ABP: --  ABP(mean): --  RR: 16 (18 Mar 2018 05:00) (16 - 17)  SpO2: 98% (18 Mar 2018 05:00) (96% - 99%)      Exam:  AA&Ox3, NAD, coherent speech,  CNs II-XII grossly intact,  motor 5/5 x 4 extr,  sensation to LT grossly intact throughout,  Head: incision c/d/i,    Plan:  MRI brain post-op  - pending,  SCDs, IS, Bowel Regimen,  SQL for DVT Prophylaxis,  Dec Taper, Cont Keppra [id number]  PT/OT,  SW for Acute Rehab placement,  D/w Dr. Mason

## 2018-03-19 LAB
GLUCOSE BLDC GLUCOMTR-MCNC: 118 MG/DL — HIGH (ref 70–99)
GLUCOSE BLDC GLUCOMTR-MCNC: 147 MG/DL — HIGH (ref 70–99)
GLUCOSE BLDC GLUCOMTR-MCNC: 97 MG/DL — SIGNIFICANT CHANGE UP (ref 70–99)

## 2018-03-19 RX ADMIN — RISPERIDONE 0.5 MILLIGRAM(S): 4 TABLET ORAL at 17:09

## 2018-03-19 RX ADMIN — PANTOPRAZOLE SODIUM 40 MILLIGRAM(S): 20 TABLET, DELAYED RELEASE ORAL at 11:08

## 2018-03-19 RX ADMIN — Medication 2 MILLIGRAM(S): at 05:45

## 2018-03-19 RX ADMIN — Medication 2 MILLIGRAM(S): at 17:09

## 2018-03-19 RX ADMIN — Medication 60 MILLIGRAM(S): at 11:09

## 2018-03-19 RX ADMIN — Medication 100 MILLIGRAM(S): at 21:11

## 2018-03-19 RX ADMIN — LAMOTRIGINE 25 MILLIGRAM(S): 25 TABLET, ORALLY DISINTEGRATING ORAL at 11:08

## 2018-03-19 RX ADMIN — Medication 100 MILLIGRAM(S): at 05:45

## 2018-03-19 RX ADMIN — LEVETIRACETAM 1000 MILLIGRAM(S): 250 TABLET, FILM COATED ORAL at 11:08

## 2018-03-19 RX ADMIN — ARIPIPRAZOLE 15 MILLIGRAM(S): 15 TABLET ORAL at 11:08

## 2018-03-19 RX ADMIN — SENNA PLUS 1 TABLET(S): 8.6 TABLET ORAL at 21:11

## 2018-03-19 RX ADMIN — RISPERIDONE 0.5 MILLIGRAM(S): 4 TABLET ORAL at 05:45

## 2018-03-19 RX ADMIN — ZOLPIDEM TARTRATE 5 MILLIGRAM(S): 10 TABLET ORAL at 00:11

## 2018-03-19 RX ADMIN — LEVETIRACETAM 1000 MILLIGRAM(S): 250 TABLET, FILM COATED ORAL at 21:11

## 2018-03-19 RX ADMIN — ZOLPIDEM TARTRATE 5 MILLIGRAM(S): 10 TABLET ORAL at 23:07

## 2018-03-19 NOTE — PROGRESS NOTE ADULT - SUBJECTIVE AND OBJECTIVE BOX
HPI:  53 yo M with PMHx seizure, GBM s/p resection, chemo, RT in 2017 p/w recurrent GBM. He was found to have recurrence on repeat MRI for which he presents today to undergo left craniotomy with tumor resection. He has no weakness, numbness, speech difficulties, HA or recent seizure. He is on Keppra 1g BID. He denies other systemic illness and any other complaints. (13 Mar 2018 07:47)    OVERNIGHT EVENTS:  Vital Signs Last 24 Hrs  T(C): 36.7 (19 Mar 2018 08:59), Max: 36.8 (18 Mar 2018 20:26)  T(F): 98 (19 Mar 2018 08:59), Max: 98.2 (18 Mar 2018 20:26)  HR: 72 (19 Mar 2018 08:59) (64 - 75)  BP: 117/78 (19 Mar 2018 08:59) (107/68 - 117/78)  BP(mean): --  RR: 18 (19 Mar 2018 08:59) (16 - 18)  SpO2: 91% (19 Mar 2018 08:59) (91% - 98%)    I&O's Summary    18 Mar 2018 07:01  -  19 Mar 2018 07:00  --------------------------------------------------------  IN: 840 mL / OUT: 400 mL / NET: 440 mL        PHYSICAL EXAM:  Neurological:  A&Ox3 Cranial nerves intact  GARVIN 5/5  Crani incision clean dry    Cardiovascular:RRR  Respiratory: Lungs CTAB  Gastrointestinal:+BS  Genitourinary: Voiding without difficulty  Extremities: warm and dry      DIET: Regular    LABS:    CAPILLARY BLOOD GLUCOSE      POCT Blood Glucose.: 97 mg/dL (19 Mar 2018 06:42)  POCT Blood Glucose.: 148 mg/dL (18 Mar 2018 20:59)  POCT Blood Glucose.: 170 mg/dL (18 Mar 2018 11:50)      Drug Levels: [] N/A    CSF Analysis: [] N/A      Allergies    No Known Allergies    Intolerances      MEDICATIONS:  Antibiotics:    Neuro:  acetaminophen   Tablet 650 milliGRAM(s) Oral every 6 hours PRN  acetaminophen   Tablet. 650 milliGRAM(s) Oral every 6 hours PRN  ARIPiprazole 15 milliGRAM(s) Oral daily  FLUoxetine 60 milliGRAM(s) Oral daily  lamoTRIgine 25 milliGRAM(s) Oral daily  levETIRAcetam 1000 milliGRAM(s) Oral every 12 hours  ondansetron Injectable 4 milliGRAM(s) IV Push every 6 hours PRN  oxyCODONE    5 mG/acetaminophen 325 mG 1 Tablet(s) Oral every 4 hours PRN  oxyCODONE    5 mG/acetaminophen 325 mG 2 Tablet(s) Oral every 6 hours PRN  risperiDONE   Tablet 0.5 milliGRAM(s) Oral two times a day  zolpidem 5 milliGRAM(s) Oral at bedtime PRN    Anticoagulation:  enoxaparin Injectable 40 milliGRAM(s) SubCutaneous at bedtime    OTHER:  dexamethasone     Tablet   Oral   dexamethasone     Tablet 2 milliGRAM(s) Oral every 12 hours  dextrose 50% Injectable 12.5 Gram(s) IV Push once  dextrose Gel 1 Dose(s) Oral once PRN  docusate sodium 100 milliGRAM(s) Oral three times a day  glucagon  Injectable 1 milliGRAM(s) IntraMuscular once PRN  insulin lispro (HumaLOG) corrective regimen sliding scale   SubCutaneous Before meals and at bedtime  labetalol Injectable 20 milliGRAM(s) IV Push once PRN  pantoprazole    Tablet 40 milliGRAM(s) Oral daily  senna 1 Tablet(s) Oral at bedtime      ASSESSMENT:  52y Male s/p Crani resection brain mass    PLAN:    NEURO:    Monitor neuro status  OAT/Pt  Steroid taper to 2mg bid  Pain Managment  Bowel regimeContinue current medical regime    Dispo: Discussed with attending

## 2018-03-20 ENCOUNTER — TRANSCRIPTION ENCOUNTER (OUTPATIENT)
Age: 53
End: 2018-03-20

## 2018-03-20 ENCOUNTER — INPATIENT (INPATIENT)
Facility: HOSPITAL | Age: 53
LOS: 7 days | Discharge: HOME HEALTH | DRG: 949 | End: 2018-03-28
Attending: PHYSICAL MEDICINE & REHABILITATION | Admitting: PHYSICAL MEDICINE & REHABILITATION
Payer: MEDICAID

## 2018-03-20 VITALS
DIASTOLIC BLOOD PRESSURE: 74 MMHG | TEMPERATURE: 97 F | SYSTOLIC BLOOD PRESSURE: 119 MMHG | HEART RATE: 72 BPM | OXYGEN SATURATION: 98 % | RESPIRATION RATE: 16 BRPM

## 2018-03-20 DIAGNOSIS — Z92.3 PERSONAL HISTORY OF IRRADIATION: ICD-10-CM

## 2018-03-20 DIAGNOSIS — Z48.3 AFTERCARE FOLLOWING SURGERY FOR NEOPLASM: ICD-10-CM

## 2018-03-20 DIAGNOSIS — F41.8 OTHER SPECIFIED ANXIETY DISORDERS: ICD-10-CM

## 2018-03-20 DIAGNOSIS — Z92.21 PERSONAL HISTORY OF ANTINEOPLASTIC CHEMOTHERAPY: ICD-10-CM

## 2018-03-20 DIAGNOSIS — R35.0 FREQUENCY OF MICTURITION: ICD-10-CM

## 2018-03-20 DIAGNOSIS — R41.840 ATTENTION AND CONCENTRATION DEFICIT: ICD-10-CM

## 2018-03-20 DIAGNOSIS — F10.10 ALCOHOL ABUSE, UNCOMPLICATED: ICD-10-CM

## 2018-03-20 DIAGNOSIS — F14.10 COCAINE ABUSE, UNCOMPLICATED: ICD-10-CM

## 2018-03-20 DIAGNOSIS — R45.87 IMPULSIVENESS: ICD-10-CM

## 2018-03-20 DIAGNOSIS — Z98.890 OTHER SPECIFIED POSTPROCEDURAL STATES: Chronic | ICD-10-CM

## 2018-03-20 DIAGNOSIS — R26.9 UNSPECIFIED ABNORMALITIES OF GAIT AND MOBILITY: ICD-10-CM

## 2018-03-20 DIAGNOSIS — R41.89 OTHER SYMPTOMS AND SIGNS INVOLVING COGNITIVE FUNCTIONS AND AWARENESS: ICD-10-CM

## 2018-03-20 DIAGNOSIS — K59.00 CONSTIPATION, UNSPECIFIED: ICD-10-CM

## 2018-03-20 DIAGNOSIS — Z51.89 ENCOUNTER FOR OTHER SPECIFIED AFTERCARE: ICD-10-CM

## 2018-03-20 DIAGNOSIS — F31.9 BIPOLAR DISORDER, UNSPECIFIED: ICD-10-CM

## 2018-03-20 DIAGNOSIS — F12.10 CANNABIS ABUSE, UNCOMPLICATED: ICD-10-CM

## 2018-03-20 DIAGNOSIS — R56.9 UNSPECIFIED CONVULSIONS: ICD-10-CM

## 2018-03-20 DIAGNOSIS — F31.89 OTHER BIPOLAR DISORDER: ICD-10-CM

## 2018-03-20 DIAGNOSIS — G47.00 INSOMNIA, UNSPECIFIED: ICD-10-CM

## 2018-03-20 DIAGNOSIS — R11.0 NAUSEA: ICD-10-CM

## 2018-03-20 DIAGNOSIS — C71.9 MALIGNANT NEOPLASM OF BRAIN, UNSPECIFIED: ICD-10-CM

## 2018-03-20 DIAGNOSIS — D43.2 NEOPLASM OF UNCERTAIN BEHAVIOR OF BRAIN, UNSPECIFIED: ICD-10-CM

## 2018-03-20 PROBLEM — G40.909 EPILEPSY, UNSPECIFIED, NOT INTRACTABLE, WITHOUT STATUS EPILEPTICUS: Chronic | Status: ACTIVE | Noted: 2018-03-12

## 2018-03-20 LAB
GLUCOSE BLDC GLUCOMTR-MCNC: 102 MG/DL — HIGH (ref 70–99)
GLUCOSE BLDC GLUCOMTR-MCNC: 87 MG/DL — SIGNIFICANT CHANGE UP (ref 70–99)

## 2018-03-20 PROCEDURE — 97161 PT EVAL LOW COMPLEX 20 MIN: CPT

## 2018-03-20 PROCEDURE — 97535 SELF CARE MNGMENT TRAINING: CPT

## 2018-03-20 PROCEDURE — 97116 GAIT TRAINING THERAPY: CPT

## 2018-03-20 PROCEDURE — 86901 BLOOD TYPING SEROLOGIC RH(D): CPT

## 2018-03-20 PROCEDURE — C1713: CPT

## 2018-03-20 PROCEDURE — 83036 HEMOGLOBIN GLYCOSYLATED A1C: CPT

## 2018-03-20 PROCEDURE — A9585: CPT

## 2018-03-20 PROCEDURE — 82962 GLUCOSE BLOOD TEST: CPT

## 2018-03-20 PROCEDURE — A9577: CPT

## 2018-03-20 PROCEDURE — 97162 PT EVAL MOD COMPLEX 30 MIN: CPT

## 2018-03-20 PROCEDURE — 80048 BASIC METABOLIC PNL TOTAL CA: CPT

## 2018-03-20 PROCEDURE — 85610 PROTHROMBIN TIME: CPT

## 2018-03-20 PROCEDURE — 86900 BLOOD TYPING SEROLOGIC ABO: CPT

## 2018-03-20 PROCEDURE — 36415 COLL VENOUS BLD VENIPUNCTURE: CPT

## 2018-03-20 PROCEDURE — 88307 TISSUE EXAM BY PATHOLOGIST: CPT

## 2018-03-20 PROCEDURE — 70552 MRI BRAIN STEM W/DYE: CPT

## 2018-03-20 PROCEDURE — 88341 IMHCHEM/IMCYTCHM EA ADD ANTB: CPT

## 2018-03-20 PROCEDURE — 88342 IMHCHEM/IMCYTCHM 1ST ANTB: CPT

## 2018-03-20 PROCEDURE — 85027 COMPLETE CBC AUTOMATED: CPT

## 2018-03-20 PROCEDURE — 86850 RBC ANTIBODY SCREEN: CPT

## 2018-03-20 PROCEDURE — 97530 THERAPEUTIC ACTIVITIES: CPT

## 2018-03-20 PROCEDURE — 88360 TUMOR IMMUNOHISTOCHEM/MANUAL: CPT

## 2018-03-20 PROCEDURE — 83735 ASSAY OF MAGNESIUM: CPT

## 2018-03-20 PROCEDURE — 93970 EXTREMITY STUDY: CPT

## 2018-03-20 PROCEDURE — C1889: CPT

## 2018-03-20 PROCEDURE — 88331 PATH CONSLTJ SURG 1 BLK 1SPC: CPT

## 2018-03-20 PROCEDURE — 84100 ASSAY OF PHOSPHORUS: CPT

## 2018-03-20 RX ORDER — FLUOXETINE HCL 10 MG
3 CAPSULE ORAL
Qty: 0 | Refills: 0 | COMMUNITY
Start: 2018-03-20

## 2018-03-20 RX ORDER — CABERGOLINE 0.5 MG/1
1 TABLET ORAL
Qty: 0 | Refills: 0 | COMMUNITY

## 2018-03-20 RX ORDER — ENOXAPARIN SODIUM 100 MG/ML
40 INJECTION SUBCUTANEOUS
Qty: 0 | Refills: 0 | COMMUNITY
Start: 2018-03-20

## 2018-03-20 RX ORDER — LABETALOL HCL 100 MG
4 TABLET ORAL
Qty: 0 | Refills: 0 | COMMUNITY
Start: 2018-03-20

## 2018-03-20 RX ORDER — LEVETIRACETAM 250 MG/1
1 TABLET, FILM COATED ORAL
Qty: 0 | Refills: 0 | COMMUNITY
Start: 2018-03-20

## 2018-03-20 RX ORDER — ZOLPIDEM TARTRATE 10 MG/1
1 TABLET ORAL
Qty: 0 | Refills: 0 | COMMUNITY

## 2018-03-20 RX ORDER — FLUOXETINE HCL 10 MG
1 CAPSULE ORAL
Qty: 0 | Refills: 0 | COMMUNITY

## 2018-03-20 RX ORDER — TRAZODONE HCL 50 MG
50 TABLET ORAL AT BEDTIME
Qty: 0 | Refills: 0 | Status: DISCONTINUED | OUTPATIENT
Start: 2018-03-20 | End: 2018-03-20

## 2018-03-20 RX ORDER — SENNA PLUS 8.6 MG/1
1 TABLET ORAL
Qty: 0 | Refills: 0 | COMMUNITY
Start: 2018-03-20

## 2018-03-20 RX ORDER — DEXAMETHASONE 0.5 MG/5ML
2 ELIXIR ORAL
Qty: 0 | Refills: 0 | COMMUNITY
Start: 2018-03-20

## 2018-03-20 RX ORDER — ACETAMINOPHEN 500 MG
2 TABLET ORAL
Qty: 0 | Refills: 0 | COMMUNITY
Start: 2018-03-20

## 2018-03-20 RX ORDER — LAMOTRIGINE 25 MG/1
1 TABLET, ORALLY DISINTEGRATING ORAL
Qty: 0 | Refills: 0 | COMMUNITY
Start: 2018-03-20

## 2018-03-20 RX ORDER — TRAZODONE HCL 50 MG
1 TABLET ORAL
Qty: 0 | Refills: 0 | COMMUNITY
Start: 2018-03-20

## 2018-03-20 RX ORDER — ARIPIPRAZOLE 15 MG/1
1 TABLET ORAL
Qty: 0 | Refills: 0 | COMMUNITY

## 2018-03-20 RX ORDER — RISPERIDONE 4 MG/1
1 TABLET ORAL
Qty: 0 | Refills: 0 | COMMUNITY
Start: 2018-03-20

## 2018-03-20 RX ORDER — DOCUSATE SODIUM 100 MG
1 CAPSULE ORAL
Qty: 0 | Refills: 0 | COMMUNITY
Start: 2018-03-20

## 2018-03-20 RX ORDER — PANTOPRAZOLE SODIUM 20 MG/1
1 TABLET, DELAYED RELEASE ORAL
Qty: 0 | Refills: 0 | COMMUNITY
Start: 2018-03-20

## 2018-03-20 RX ORDER — LEVETIRACETAM 250 MG/1
1 TABLET, FILM COATED ORAL
Qty: 0 | Refills: 0 | COMMUNITY

## 2018-03-20 RX ORDER — RISPERIDONE 4 MG/1
1 TABLET ORAL
Qty: 0 | Refills: 0 | COMMUNITY

## 2018-03-20 RX ORDER — LAMOTRIGINE 25 MG/1
1 TABLET, ORALLY DISINTEGRATING ORAL
Qty: 0 | Refills: 0 | COMMUNITY

## 2018-03-20 RX ORDER — ARIPIPRAZOLE 15 MG/1
1 TABLET ORAL
Qty: 0 | Refills: 0 | COMMUNITY
Start: 2018-03-20

## 2018-03-20 RX ADMIN — OXYCODONE AND ACETAMINOPHEN 2 TABLET(S): 5; 325 TABLET ORAL at 03:10

## 2018-03-20 RX ADMIN — PANTOPRAZOLE SODIUM 40 MILLIGRAM(S): 20 TABLET, DELAYED RELEASE ORAL at 11:13

## 2018-03-20 RX ADMIN — Medication 60 MILLIGRAM(S): at 11:13

## 2018-03-20 RX ADMIN — Medication 100 MILLIGRAM(S): at 06:18

## 2018-03-20 RX ADMIN — LAMOTRIGINE 25 MILLIGRAM(S): 25 TABLET, ORALLY DISINTEGRATING ORAL at 11:12

## 2018-03-20 RX ADMIN — ARIPIPRAZOLE 15 MILLIGRAM(S): 15 TABLET ORAL at 11:13

## 2018-03-20 RX ADMIN — OXYCODONE AND ACETAMINOPHEN 2 TABLET(S): 5; 325 TABLET ORAL at 04:00

## 2018-03-20 RX ADMIN — LEVETIRACETAM 1000 MILLIGRAM(S): 250 TABLET, FILM COATED ORAL at 11:13

## 2018-03-20 RX ADMIN — RISPERIDONE 0.5 MILLIGRAM(S): 4 TABLET ORAL at 06:18

## 2018-03-20 RX ADMIN — Medication 2 MILLIGRAM(S): at 06:18

## 2018-03-20 NOTE — BEHAVIORAL HEALTH ASSESSMENT NOTE - DESCRIPTION (FIRST USE, LAST USE, QUANTITY, FREQUENCY, DURATION)
drinks socially, up to 1/2 bottle of wine or more, no history of alcohol use treatment, no history of DUI Patient reports history of cannabis use to manage anxiety symptoms. He expressed interest in obtaining a [facility] cannabis permit.

## 2018-03-20 NOTE — BEHAVIORAL HEALTH ASSESSMENT NOTE - NSBHCONSULTPRIMARYDISCUSSYES_PSY_A_CORE FT
Reviewed inconsistent psychiatric history  Reached out to  and previous psychiatrist - unable to reach at this time.

## 2018-03-20 NOTE — DISCHARGE NOTE ADULT - CARE PLAN
Principal Discharge DX:	GBM (glioblastoma multiforme)  Goal:	Resume independent activity  Assessment and plan of treatment:	1. You should wash your hair starting 24 hours after being discharged. Use your normal  shampoo. During the shampoo, massage the staples to remove any dried blood  or crusting. This keeps your wound clean and helps healing. you should shampoo everyday.  2. Pat the wound dry with a clean towel afterwards and leave it open to air. Do not  apply creams or ointments to the wound. Mild swelling around the incision is  common.  3. Follow-up with one of the Nurse Practitioners for suture or staple removal 7-10  days after surgery. Call our office at [phone] to set up the appointment  once you get home.  4. Inform the doctor immediately if you have a fever (above 101), chills, night  sweats, wound drainage, increasing wound redness or pain, nausea, vomiting, or  worsening headache.  Goal:	Activity/Pain  Assessment and plan of treatment:	B. ACTIVITY LEVEL  1. Fatigue is common following brain surgery. Listen to your body and rest if  you feel tired.  2. You should get up and walk around every hour during the daytime.  3. No bending, lifting or twisting for the first 3 weeks, but walking is  recommended.  4. Drink plenty of water.     Pain Medications: You may be given a narcotic pain reliever such as Percocet  (oxycodone-acetaminophen). These are for use only for a short time after  surgery. They may cause drowsiness, nausea, and constipation. Take after food   and drink plenty of water to help reduce side effects. Do not drink alcohol with  these medications.

## 2018-03-20 NOTE — DISCHARGE NOTE ADULT - MEDICATION SUMMARY - MEDICATIONS TO TAKE
I will START or STAY ON the medications listed below when I get home from the hospital:    dexamethasone  -- Take 4mg every 12 hours for 2 days, then 2mg every 12 hours for 5 days  -- Indication: For Brain swelling    oxyCODONE-acetaminophen 5 mg-325 mg oral tablet  -- 1 tab(s) by mouth every 4 hours, As needed, Moderate Pain (4 - 6)  -- Indication: For Severe pain    oxyCODONE-acetaminophen 5 mg-325 mg oral tablet  -- 2 tab(s) by mouth every 6 hours, As needed, Severe Pain (7 - 10)  -- Indication: For Severe pain    acetaminophen 325 mg oral tablet  -- 2 tab(s) by mouth every 6 hours, As needed, For Temp greater than 38 C (100.4 F)  -- Indication: For Fever    acetaminophen 325 mg oral tablet  -- 2 tab(s) by mouth every 6 hours, As needed, Mild Pain (1 - 3)  -- Indication: For  Mild pain    enoxaparin  -- 40 unit(s) subcutaneous once a day (at bedtime)  -- Indication: For DVT prophylaxis    lamoTRIgine 25 mg oral tablet  -- 1 tab(s) by mouth once a day  -- Indication: For Seizure disorder    levETIRAcetam 1000 mg oral tablet  -- 1 tab(s) by mouth every 12 hours  -- Indication: For Seizure disorder    FLUoxetine 20 mg oral capsule  -- 3 cap(s) by mouth once a day  -- Indication: For Depression    traZODone 50 mg oral tablet  -- 1 tab(s) by mouth once a day (at bedtime), As needed, insomnia  -- Indication: For Depression    insulin lispro  -- 2 Unit(s) if Glucose 151 - 200  4 Unit(s) if Glucose 201 - 250  6 Unit(s) if Glucose 251 - 300  8 Unit(s) if Glucose 301 - 350  10 Unit(s) if Glucose 351 - 400  12 Unit(s) if Glucose Greater Than 400  -- Indication: For While on steroids    ARIPiprazole 15 mg oral tablet  -- 1 tab(s) by mouth once a day  -- Indication: For Antipsychotic    risperiDONE 0.5 mg oral tablet  -- 1 tab(s) by mouth 2 times a day  -- Indication: For Antipsychotic    labetalol 5 mg/mL intravenous solution  -- 4 milliliter(s) intravenous once, As needed, Systolic blood pressure > 140  -- Indication: For Hypertension    docusate sodium 100 mg oral capsule  -- 1 cap(s) by mouth 3 times a day  -- Indication: For Stool softener    senna oral tablet  -- 1 tab(s) by mouth once a day (at bedtime)  -- Indication: For Stool softener    pantoprazole 40 mg oral delayed release tablet  -- 1 tab(s) by mouth once a day  -- Indication: For GI prophylaxis while on steroids

## 2018-03-20 NOTE — BEHAVIORAL HEALTH ASSESSMENT NOTE - SUMMARY
52 year old male, domiciled, documentary filmmaker, with medical history of GBM, s/p recent second resection, chemo and radiation therapy in 2017, underwent left parietal craniotomy with tumor resection, seizure disorder, past psychiatric history of mood disorder/depression, two psychiatric hospitalizations for mood instability and paranoia between 2017 - 2018, on psychotropic medication regimen of fluoxetine, lamictal, abilify and more recently risperdal as well as ambien for sleep with fair adherence. Mr. Hernandez reports initial diagnosis with mood disorder "bipolar III" in the late 1990's while living in [place], previously treated by an outpatient psychiatrist with antidepressant and mood stabilizers with reported overall good benefit.

## 2018-03-20 NOTE — BEHAVIORAL HEALTH ASSESSMENT NOTE - NSBHCONSULTFOLLOWDETAILS_PSY_A_CORE FT
Patient has 20 - 30 year history of mood disorder, two relatively recent psychiatric hospitalizations and no outpatient psychiatrist or therapist who is following him at this time. Recommend that social work assess needs (Private versus clinic) and refer to outpatient psychiatry.

## 2018-03-20 NOTE — BEHAVIORAL HEALTH ASSESSMENT NOTE - NSBHCONSULTRECOMMENDOTHER_PSY_A_CORE FT
Recommend transition patient from ambien 10 mg to trazodone 50 mg PO QHS (Discontinue ambien 10 mg PO QHS)

## 2018-03-20 NOTE — BEHAVIORAL HEALTH ASSESSMENT NOTE - NSBHREFERDETAILS_PSY_A_CORE_FT
52 year old male, history of GBM, recent second resection, chemo and radiation therapy, seizure disorder, past psychiatric history of mood disorder/depression: team requests consultation regarding psychotropic medication regimen.        Location – [facility] [place]    Requested by Angelica [phone]    Reason for consult – 52 year old male with hx of anxiety

## 2018-03-20 NOTE — DISCHARGE NOTE ADULT - HOSPITAL COURSE
51 yo M with PMHx seizure, GBM s/p resection, chemo, RT in 2017 p/w recurrent GBM. He was found to have recurrence on repeat MRI. Denies weakness, numbness, speech difficulties, HA or recent seizure. He is on Keppra 1g BID at home. He denies other systemic illness and any other complaints. On 3/13/2018 Pt underwent left-sided stereotactic craniotomy and gross total resection of brain tumor. Frozen showed high grade glioma. No drains were placed. Post-operative hospital course was uncomplicated. Briefly patient was placed on enhanced supervision for impulsivity getting out of bed. Psychiatry evaluated and recommended to continue home medications lamictal, abilify, risperdal and prozac. Ambien for insomnia was changed to trazodone. Pt is stable to be discharged today to rehab and will be discharged on a 1 week tapering course of steroids to be completely finished in 1 week.

## 2018-03-20 NOTE — DISCHARGE NOTE ADULT - CARE PROVIDER_API CALL
Vishnu Mason), Neurological Surgery  [address], [place] [address]  Phone: [phone]  Fax: [phone] Vishnu Mason), Neurological Surgery  [address] [address]  Phone: [phone]  Fax: [phone]    Cristiane Andino), ChildAdolescent Psychiatry; Psychiatry  [address] [place] [address]  Phone: [phone]  Fax: [phone]

## 2018-03-20 NOTE — DISCHARGE NOTE ADULT - PATIENT PORTAL LINK FT
You can access the [facility] Patient Portal, offered by [facility], by registering with the following website: [email or web address] [place]

## 2018-03-20 NOTE — DISCHARGE NOTE ADULT - PLAN OF CARE
Resume independent activity 1. You should wash your hair starting 24 hours after being discharged. Use your normal  shampoo. During the shampoo, massage the staples to remove any dried blood  or crusting. This keeps your wound clean and helps healing. you should shampoo everyday.  2. Pat the wound dry with a clean towel afterwards and leave it open to air. Do not  apply creams or ointments to the wound. Mild swelling around the incision is  common.  3. Follow-up with one of the Nurse Practitioners for suture or staple removal 7-10  days after surgery. Call our office at [phone] to set up the appointment  once you get home.  4. Inform the doctor immediately if you have a fever (above 101), chills, night  sweats, wound drainage, increasing wound redness or pain, nausea, vomiting, or  worsening headache. Activity/Pain B. ACTIVITY LEVEL  1. Fatigue is common following brain surgery. Listen to your body and rest if  you feel tired.  2. You should get up and walk around every hour during the daytime.  3. No bending, lifting or twisting for the first 3 weeks, but walking is  recommended.  4. Drink plenty of water.     Pain Medications: You may be given a narcotic pain reliever such as Percocet  (oxycodone-acetaminophen). These are for use only for a short time after  surgery. They may cause drowsiness, nausea, and constipation. Take after food   and drink plenty of water to help reduce side effects. Do not drink alcohol with  these medications.

## 2018-03-20 NOTE — DISCHARGE NOTE ADULT - NS AS ACTIVITY OBS
Do not drive or operate machinery/Stairs allowed/Walking-Outdoors allowed/No Heavy lifting/straining/Showering allowed/Walking-Indoors allowed/Bathing allowed

## 2018-03-20 NOTE — BEHAVIORAL HEALTH ASSESSMENT NOTE - OTHER PAST PSYCHIATRIC HISTORY (INCLUDE DETAILS REGARDING ONSET, COURSE OF ILLNESS, INPATIENT/OUTPATIENT TREATMENT)
History of two psychiatric hospitalizations between 2016 and 2018, each for one week in context of non-adherence to psychotropic medication regimen, lack of access to care in [facility]. Goal of treatment was mood stabilization however discharge plan was not sufficient to meet patient needs. He reports obtaining outpatient psychotropic medications from his GP Dr. Henriquez [phone] but inconsistently.  Psychiatrist in [place] is Dr. Ozzy La [phone]

## 2018-03-20 NOTE — DISCHARGE NOTE ADULT - CARE PROVIDERS DIRECT ADDRESSES
,[email or web address]

## 2018-03-20 NOTE — BEHAVIORAL HEALTH ASSESSMENT NOTE - HPI (INCLUDE ILLNESS QUALITY, SEVERITY, DURATION, TIMING, CONTEXT, MODIFYING FACTORS, ASSOCIATED SIGNS AND SYMPTOMS)
52 year old male, domiciled, documentary filmmaker, with medical history of GBM, s/p recent second resection, chemo and radiation therapy in 2017, underwent left parietal craniotomy with tumor resection, seizure disorder, past psychiatric history of mood disorder/depression, two psychiatric hospitalizations for mood instability and paranoia between 2017 - 2018, on psychotropic medication regimen of fluoxetine, lamictal, abilify and more recently risperdal as well as ambien for sleep with fair adherence. Mr. Hernandez reports initial diagnosis with mood disorder "bipolar III" in the late 1990's while living in [place], previously treated by an outpatient psychiatrist with antidepressant and mood stabilizers with reported overall good benefit. Reports that when he moved to [place] a few years ago, he became disconnected to care, found treatment at clinics too "time consuming" and relied on his  to prescribe some of his psychotropic regimen. Approximately two years ago, discontinued fluoxetine for unclear reasons and developed severe depression resulting in hospitalizations. He reports at least one manic episode during which he became "paranoid" and may have had delusions (this is unclear) and reports that generally he suffers from depressive episodes. At a separate point in interview, Mr. Hernandez reports that he has "up and down mood episodes." There is no reported history of suicidal thoughts, intent or plan and no reported history of homicidal thoughts, intent or plan. No reported history of aggression/violence. Seen at bedside, Mr. Hernandez reports that he is "just very tired, fatigued" and that he is not depressed or anxious. He reports that he is not sure that the prozac is "helping him enough" and that cannabis has been helpful in the past to manage his anxiety symptoms, which he denied at present time, as noted. He reports interest in continuing cannabis treatment and receiving a [place] certificate for [place]. He reports that his  is Dr. Henriquez [phone]. His psychiatrist in [place] is Bessie [phone] but he has not seen him or been in contact with him in over one year. Reports that he uses [facility] (?possibly [address])

## 2018-03-20 NOTE — DISCHARGE NOTE ADULT - MEDICATION SUMMARY - MEDICATIONS TO STOP TAKING
I will STOP taking the medications listed below when I get home from the hospital:    cabergoline 0.5 mg oral tablet  -- 1 tab(s) by mouth 2 times a week    Ambien 10 mg oral tablet  -- 1 tab(s) by mouth once a day (at bedtime), As Needed

## 2018-03-20 NOTE — PROGRESS NOTE ADULT - SUBJECTIVE AND OBJECTIVE BOX
NP Note Neurosurgery Dr Mason    Mr. Hernandez is a 51 yo right handed male underwent on 3/12/2018 Craniotomy for resection of a recurrent mass. No post o complications. Pt is progressing nicely both with Physical and Occupational Therapy. Pt would benefit from Acute Rehab prior to discharge home to regain his independent activity   Pt can tolerate 3 hours or more of intensive therapy   Any questions please call me at     Chika Boone [facility]

## 2018-03-20 NOTE — PROGRESS NOTE ADULT - SUBJECTIVE AND OBJECTIVE BOX
HPI:  51 yo M with PMHx seizure, GBM s/p resection, chemo, RT in 2017 p/w recurrent GBM. He was found to have recurrence on repeat MRI for which he presents today to undergo left craniotomy with tumor resection. He has no weakness, numbness, speech difficulties, HA or recent seizure. He is on Keppra 1g BID. He denies other systemic illness and any other complaints. (13 Mar 2018 07:47)    OVERNIGHT EVENTS:  Vital Signs Last 24 Hrs  T(C): 36.6 (20 Mar 2018 04:29), Max: 36.7 (19 Mar 2018 08:59)  T(F): 97.8 (20 Mar 2018 04:29), Max: 98 (19 Mar 2018 08:59)  HR: 59 (20 Mar 2018 04:29) (59 - 75)  BP: 100/68 (20 Mar 2018 04:29) (100/68 - 142/90)  BP(mean): --  RR: 18 (20 Mar 2018 04:29) (18 - 18)  SpO2: 96% (20 Mar 2018 04:29) (91% - 99%)    I&O's Summary      PHYSICAL EXAM:  Neurological:  A&OX3 Cranial nerves intac  tMAE gait guarded  Crani mariam CDI    Cardiovascular:RRR  Respiratory: Lungs CTAB  Gastrointestinal: +BS  Genitourinary: Voiding without difficulty  Extremities: warm and dry    DIET: Regular    LABS:    CAPILLARY BLOOD GLUCOSE      POCT Blood Glucose.: 87 mg/dL (20 Mar 2018 06:45)  POCT Blood Glucose.: 147 mg/dL (19 Mar 2018 21:36)  POCT Blood Glucose.: 118 mg/dL (19 Mar 2018 16:47)      Drug Levels: [] N/A    CSF Analysis: [] N/A      Allergies    No Known Allergies    Intolerances      MEDICATIONS:  Antibiotics:    Neuro:  acetaminophen   Tablet 650 milliGRAM(s) Oral every 6 hours PRN  acetaminophen   Tablet. 650 milliGRAM(s) Oral every 6 hours PRN  ARIPiprazole 15 milliGRAM(s) Oral daily  FLUoxetine 60 milliGRAM(s) Oral daily  lamoTRIgine 25 milliGRAM(s) Oral daily  levETIRAcetam 1000 milliGRAM(s) Oral every 12 hours  ondansetron Injectable 4 milliGRAM(s) IV Push every 6 hours PRN  oxyCODONE    5 mG/acetaminophen 325 mG 1 Tablet(s) Oral every 4 hours PRN  oxyCODONE    5 mG/acetaminophen 325 mG 2 Tablet(s) Oral every 6 hours PRN  risperiDONE   Tablet 0.5 milliGRAM(s) Oral two times a day  zolpidem 5 milliGRAM(s) Oral at bedtime PRN    Anticoagulation:  enoxaparin Injectable 40 milliGRAM(s) SubCutaneous at bedtime    OTHER:  dexamethasone     Tablet   Oral   dexamethasone     Tablet 2 milliGRAM(s) Oral every 12 hours  dextrose 50% Injectable 12.5 Gram(s) IV Push once  dextrose Gel 1 Dose(s) Oral once PRN  docusate sodium 100 milliGRAM(s) Oral three times a day  glucagon  Injectable 1 milliGRAM(s) IntraMuscular once PRN  insulin lispro (HumaLOG) corrective regimen sliding scale   SubCutaneous Before meals and at bedtime  labetalol Injectable 20 milliGRAM(s) IV Push once PRN  pantoprazole    Tablet 40 milliGRAM(s) Oral daily  senna 1 Tablet(s) Oral at bedtime      ASSESSMENT:  52y Male s/p Crani for resection brain mass    PLAN:    NEURO:    Monitor neuro status  OT/PT  Steroid therapy  Pain mangement  Bowel regime  Continue current medical regime    Dispo: Discussed with attending

## 2018-03-21 PROCEDURE — 93010 ELECTROCARDIOGRAM REPORT: CPT

## 2018-03-21 PROCEDURE — 99222 1ST HOSP IP/OBS MODERATE 55: CPT

## 2018-03-21 PROCEDURE — 99223 1ST HOSP IP/OBS HIGH 75: CPT

## 2018-03-22 DIAGNOSIS — D35.2 BENIGN NEOPLASM OF PITUITARY GLAND: ICD-10-CM

## 2018-03-22 DIAGNOSIS — Z28.21 IMMUNIZATION NOT CARRIED OUT BECAUSE OF PATIENT REFUSAL: ICD-10-CM

## 2018-03-22 DIAGNOSIS — F41.8 OTHER SPECIFIED ANXIETY DISORDERS: ICD-10-CM

## 2018-03-22 DIAGNOSIS — F31.9 BIPOLAR DISORDER, UNSPECIFIED: ICD-10-CM

## 2018-03-22 DIAGNOSIS — C71.9 MALIGNANT NEOPLASM OF BRAIN, UNSPECIFIED: ICD-10-CM

## 2018-03-22 DIAGNOSIS — G40.909 EPILEPSY, UNSPECIFIED, NOT INTRACTABLE, WITHOUT STATUS EPILEPTICUS: ICD-10-CM

## 2018-03-22 PROCEDURE — 99223 1ST HOSP IP/OBS HIGH 75: CPT

## 2018-03-22 PROCEDURE — 90792 PSYCH DIAG EVAL W/MED SRVCS: CPT

## 2018-03-22 PROCEDURE — 99233 SBSQ HOSP IP/OBS HIGH 50: CPT

## 2018-03-23 DIAGNOSIS — C71.9 MALIGNANT NEOPLASM OF BRAIN, UNSPECIFIED: ICD-10-CM

## 2018-03-23 PROCEDURE — 99232 SBSQ HOSP IP/OBS MODERATE 35: CPT

## 2018-03-24 PROCEDURE — 99232 SBSQ HOSP IP/OBS MODERATE 35: CPT | Mod: GC

## 2018-03-25 PROCEDURE — 99232 SBSQ HOSP IP/OBS MODERATE 35: CPT | Mod: GC

## 2018-03-26 PROCEDURE — 99232 SBSQ HOSP IP/OBS MODERATE 35: CPT

## 2018-03-26 PROCEDURE — 99233 SBSQ HOSP IP/OBS HIGH 50: CPT

## 2018-03-27 PROCEDURE — 99232 SBSQ HOSP IP/OBS MODERATE 35: CPT

## 2018-03-27 PROCEDURE — 96118: CPT

## 2018-03-28 ENCOUNTER — RX RENEWAL (OUTPATIENT)
Age: 53
End: 2018-03-28

## 2018-03-28 DIAGNOSIS — G47.00 INSOMNIA, UNSPECIFIED: ICD-10-CM

## 2018-03-28 DIAGNOSIS — R11.0 NAUSEA: ICD-10-CM

## 2018-03-28 DIAGNOSIS — F31.9 BIPOLAR DISORDER, UNSPECIFIED: ICD-10-CM

## 2018-03-28 PROCEDURE — 92523 SPEECH SOUND LANG COMPREHEN: CPT

## 2018-03-28 PROCEDURE — 85025 COMPLETE CBC W/AUTO DIFF WBC: CPT

## 2018-03-28 PROCEDURE — 92507 TX SP LANG VOICE COMM INDIV: CPT

## 2018-03-28 PROCEDURE — 97530 THERAPEUTIC ACTIVITIES: CPT

## 2018-03-28 PROCEDURE — 97112 NEUROMUSCULAR REEDUCATION: CPT

## 2018-03-28 PROCEDURE — 93005 ELECTROCARDIOGRAM TRACING: CPT

## 2018-03-28 PROCEDURE — 97116 GAIT TRAINING THERAPY: CPT

## 2018-03-28 PROCEDURE — 97535 SELF CARE MNGMENT TRAINING: CPT

## 2018-03-28 PROCEDURE — 92610 EVALUATE SWALLOWING FUNCTION: CPT

## 2018-03-28 PROCEDURE — 97167 OT EVAL HIGH COMPLEX 60 MIN: CPT

## 2018-03-28 PROCEDURE — 97163 PT EVAL HIGH COMPLEX 45 MIN: CPT

## 2018-03-28 PROCEDURE — 99239 HOSP IP/OBS DSCHRG MGMT >30: CPT

## 2018-03-28 PROCEDURE — 80048 BASIC METABOLIC PNL TOTAL CA: CPT

## 2018-03-28 PROCEDURE — 80053 COMPREHEN METABOLIC PANEL: CPT

## 2018-03-28 PROCEDURE — 97110 THERAPEUTIC EXERCISES: CPT

## 2018-03-29 ENCOUNTER — APPOINTMENT (OUTPATIENT)
Dept: NEUROSURGERY | Facility: CLINIC | Age: 53
End: 2018-03-29
Payer: MEDICAID

## 2018-03-29 VITALS
OXYGEN SATURATION: 100 % | HEART RATE: 64 BPM | RESPIRATION RATE: 14 BRPM | HEIGHT: 78 IN | DIASTOLIC BLOOD PRESSURE: 62 MMHG | BODY MASS INDEX: 24.18 KG/M2 | WEIGHT: 209 LBS | TEMPERATURE: 97.6 F | SYSTOLIC BLOOD PRESSURE: 89 MMHG

## 2018-03-29 PROCEDURE — 99024 POSTOP FOLLOW-UP VISIT: CPT

## 2018-03-29 RX ORDER — LAMOTRIGINE 25 MG/1
25 TABLET, FOR SUSPENSION ORAL
Refills: 0 | Status: DISCONTINUED | COMMUNITY
End: 2018-03-29

## 2018-03-29 RX ORDER — CABERGOLINE 0.5 MG/1
0.5 TABLET ORAL
Qty: 60 | Refills: 0 | Status: DISCONTINUED | COMMUNITY
End: 2018-03-29

## 2018-04-02 ENCOUNTER — APPOINTMENT (OUTPATIENT)
Dept: NEUROLOGY | Facility: CLINIC | Age: 53
End: 2018-04-02
Payer: MEDICAID

## 2018-04-02 ENCOUNTER — APPOINTMENT (OUTPATIENT)
Dept: RADIATION ONCOLOGY | Facility: CLINIC | Age: 53
End: 2018-04-02
Payer: MEDICAID

## 2018-04-02 VITALS
BODY MASS INDEX: 25.24 KG/M2 | WEIGHT: 218.4 LBS | RESPIRATION RATE: 18 BRPM | DIASTOLIC BLOOD PRESSURE: 80 MMHG | HEART RATE: 83 BPM | SYSTOLIC BLOOD PRESSURE: 123 MMHG | OXYGEN SATURATION: 96 %

## 2018-04-02 VITALS
BODY MASS INDEX: 25.22 KG/M2 | DIASTOLIC BLOOD PRESSURE: 79 MMHG | HEART RATE: 81 BPM | HEIGHT: 78 IN | OXYGEN SATURATION: 98 % | WEIGHT: 218 LBS | SYSTOLIC BLOOD PRESSURE: 122 MMHG

## 2018-04-02 PROCEDURE — 99205 OFFICE O/P NEW HI 60 MIN: CPT | Mod: 25

## 2018-04-02 PROCEDURE — 99205 OFFICE O/P NEW HI 60 MIN: CPT

## 2018-04-02 PROCEDURE — 77263 THER RADIOLOGY TX PLNG CPLX: CPT

## 2018-04-02 RX ORDER — DRONABINOL 2.5 MG/1
2.5 CAPSULE ORAL 3 TIMES DAILY
Qty: 90 | Refills: 0 | Status: DISCONTINUED | COMMUNITY
Start: 2018-03-29 | End: 2018-04-02

## 2018-04-03 LAB
ALBUMIN SERPL ELPH-MCNC: 4.5 G/DL
ALP BLD-CCNC: 68 U/L
ALT SERPL-CCNC: 25 U/L
ANION GAP SERPL CALC-SCNC: 14 MMOL/L
APPEARANCE: CLEAR
AST SERPL-CCNC: 21 U/L
BASOPHILS # BLD AUTO: 0.05 K/UL
BASOPHILS NFR BLD AUTO: 0.9 %
BILIRUB SERPL-MCNC: 0.2 MG/DL
BILIRUBIN URINE: NEGATIVE
BLOOD URINE: NEGATIVE
BUN SERPL-MCNC: 24 MG/DL
CALCIUM SERPL-MCNC: 9.9 MG/DL
CHLORIDE SERPL-SCNC: 101 MMOL/L
CO2 SERPL-SCNC: 30 MMOL/L
COLOR: YELLOW
CREAT SERPL-MCNC: 1.24 MG/DL
EOSINOPHIL # BLD AUTO: 0.17 K/UL
EOSINOPHIL NFR BLD AUTO: 3 %
GLUCOSE QUALITATIVE U: NEGATIVE MG/DL
GLUCOSE SERPL-MCNC: 92 MG/DL
HAV IGM SER QL: NONREACTIVE
HBV CORE IGM SER QL: NONREACTIVE
HBV SURFACE AG SER QL: NONREACTIVE
HCT VFR BLD CALC: 41 %
HCV AB SER QL: NONREACTIVE
HCV S/CO RATIO: 0.11 S/CO
HGB BLD-MCNC: 12.8 G/DL
IMM GRANULOCYTES NFR BLD AUTO: 0.2 %
KETONES URINE: NEGATIVE
LEUKOCYTE ESTERASE URINE: NEGATIVE
LYMPHOCYTES # BLD AUTO: 1.82 K/UL
LYMPHOCYTES NFR BLD AUTO: 31.7 %
MAN DIFF?: NORMAL
MCHC RBC-ENTMCNC: 29.7 PG
MCHC RBC-ENTMCNC: 31.2 GM/DL
MCV RBC AUTO: 95.1 FL
MONOCYTES # BLD AUTO: 0.69 K/UL
MONOCYTES NFR BLD AUTO: 12 %
NEUTROPHILS # BLD AUTO: 3 K/UL
NEUTROPHILS NFR BLD AUTO: 52.2 %
NITRITE URINE: NEGATIVE
PH URINE: 7
PLATELET # BLD AUTO: 211 K/UL
POTASSIUM SERPL-SCNC: 4.3 MMOL/L
PROT SERPL-MCNC: 7 G/DL
PROTEIN URINE: NEGATIVE MG/DL
RBC # BLD: 4.31 M/UL
RBC # FLD: 14.1 %
SODIUM SERPL-SCNC: 145 MMOL/L
SPECIFIC GRAVITY URINE: 1.02
UROBILINOGEN URINE: NEGATIVE MG/DL
WBC # FLD AUTO: 5.74 K/UL

## 2018-04-05 PROCEDURE — 77334 RADIATION TREATMENT AID(S): CPT | Mod: 26

## 2018-04-06 PROCEDURE — 77470 SPECIAL RADIATION TREATMENT: CPT | Mod: 26

## 2018-04-13 RX ORDER — BEVACIZUMAB 400 MG/16ML
988 INJECTION, SOLUTION INTRAVENOUS ONCE
Qty: 0 | Refills: 0 | Status: DISCONTINUED | OUTPATIENT
Start: 2018-04-16 | End: 2018-05-01

## 2018-04-16 ENCOUNTER — APPOINTMENT (OUTPATIENT)
Dept: INFUSION THERAPY | Facility: HOSPITAL | Age: 53
End: 2018-04-16

## 2018-04-16 ENCOUNTER — OUTPATIENT (OUTPATIENT)
Dept: OUTPATIENT SERVICES | Facility: HOSPITAL | Age: 53
LOS: 1 days | End: 2018-04-16
Payer: COMMERCIAL

## 2018-04-16 DIAGNOSIS — C71.9 MALIGNANT NEOPLASM OF BRAIN, UNSPECIFIED: ICD-10-CM

## 2018-04-16 DIAGNOSIS — Z98.890 OTHER SPECIFIED POSTPROCEDURAL STATES: Chronic | ICD-10-CM

## 2018-04-16 PROCEDURE — 96413 CHEMO IV INFUSION 1 HR: CPT

## 2018-04-17 VITALS
WEIGHT: 225.9 LBS | OXYGEN SATURATION: 96 % | DIASTOLIC BLOOD PRESSURE: 80 MMHG | SYSTOLIC BLOOD PRESSURE: 111 MMHG | BODY MASS INDEX: 26.11 KG/M2 | HEART RATE: 80 BPM | RESPIRATION RATE: 18 BRPM

## 2018-04-18 DIAGNOSIS — R35.0 FREQUENCY OF MICTURITION: ICD-10-CM

## 2018-04-24 VITALS
WEIGHT: 229 LBS | BODY MASS INDEX: 26.46 KG/M2 | HEART RATE: 86 BPM | SYSTOLIC BLOOD PRESSURE: 130 MMHG | OXYGEN SATURATION: 97 % | DIASTOLIC BLOOD PRESSURE: 84 MMHG

## 2018-04-26 ENCOUNTER — RX RENEWAL (OUTPATIENT)
Age: 53
End: 2018-04-26

## 2018-04-27 ENCOUNTER — OTHER (OUTPATIENT)
Age: 53
End: 2018-04-27

## 2018-04-27 RX ORDER — BEVACIZUMAB 400 MG/16ML
988 INJECTION, SOLUTION INTRAVENOUS ONCE
Qty: 0 | Refills: 0 | Status: DISCONTINUED | OUTPATIENT
Start: 2018-04-30 | End: 2018-05-15

## 2018-04-30 ENCOUNTER — OUTPATIENT (OUTPATIENT)
Dept: OUTPATIENT SERVICES | Facility: HOSPITAL | Age: 53
LOS: 1 days | End: 2018-04-30
Payer: COMMERCIAL

## 2018-04-30 ENCOUNTER — APPOINTMENT (OUTPATIENT)
Dept: INFUSION THERAPY | Facility: HOSPITAL | Age: 53
End: 2018-04-30

## 2018-04-30 ENCOUNTER — APPOINTMENT (OUTPATIENT)
Dept: NEUROSURGERY | Facility: CLINIC | Age: 53
End: 2018-04-30
Payer: MEDICAID

## 2018-04-30 ENCOUNTER — APPOINTMENT (OUTPATIENT)
Dept: NEUROLOGY | Facility: CLINIC | Age: 53
End: 2018-04-30
Payer: MEDICAID

## 2018-04-30 VITALS
SYSTOLIC BLOOD PRESSURE: 114 MMHG | HEART RATE: 94 BPM | OXYGEN SATURATION: 97 % | DIASTOLIC BLOOD PRESSURE: 79 MMHG | WEIGHT: 229 LBS | HEIGHT: 78 IN | TEMPERATURE: 98.2 F | BODY MASS INDEX: 26.49 KG/M2

## 2018-04-30 VITALS
SYSTOLIC BLOOD PRESSURE: 117 MMHG | OXYGEN SATURATION: 97 % | DIASTOLIC BLOOD PRESSURE: 83 MMHG | BODY MASS INDEX: 26.49 KG/M2 | HEART RATE: 84 BPM | HEIGHT: 78 IN | WEIGHT: 229 LBS | RESPIRATION RATE: 18 BRPM | TEMPERATURE: 97.3 F

## 2018-04-30 DIAGNOSIS — Z98.890 OTHER SPECIFIED POSTPROCEDURAL STATES: Chronic | ICD-10-CM

## 2018-04-30 DIAGNOSIS — C71.9 MALIGNANT NEOPLASM OF BRAIN, UNSPECIFIED: ICD-10-CM

## 2018-04-30 PROCEDURE — 99214 OFFICE O/P EST MOD 30 MIN: CPT

## 2018-04-30 PROCEDURE — 96413 CHEMO IV INFUSION 1 HR: CPT

## 2018-04-30 PROCEDURE — 85025 COMPLETE CBC W/AUTO DIFF WBC: CPT

## 2018-04-30 PROCEDURE — 81003 URINALYSIS AUTO W/O SCOPE: CPT

## 2018-04-30 PROCEDURE — 80053 COMPREHEN METABOLIC PANEL: CPT

## 2018-04-30 PROCEDURE — 99024 POSTOP FOLLOW-UP VISIT: CPT

## 2018-04-30 PROCEDURE — 36415 COLL VENOUS BLD VENIPUNCTURE: CPT

## 2018-05-11 RX ORDER — BEVACIZUMAB 400 MG/16ML
988 INJECTION, SOLUTION INTRAVENOUS ONCE
Qty: 0 | Refills: 0 | Status: DISCONTINUED | OUTPATIENT
Start: 2018-05-14 | End: 2018-05-29

## 2018-05-14 ENCOUNTER — APPOINTMENT (OUTPATIENT)
Dept: INFUSION THERAPY | Facility: HOSPITAL | Age: 53
End: 2018-05-14

## 2018-05-14 ENCOUNTER — OUTPATIENT (OUTPATIENT)
Dept: OUTPATIENT SERVICES | Facility: HOSPITAL | Age: 53
LOS: 1 days | End: 2018-05-14
Payer: COMMERCIAL

## 2018-05-14 DIAGNOSIS — C71.9 MALIGNANT NEOPLASM OF BRAIN, UNSPECIFIED: ICD-10-CM

## 2018-05-14 DIAGNOSIS — Z98.890 OTHER SPECIFIED POSTPROCEDURAL STATES: Chronic | ICD-10-CM

## 2018-05-14 PROCEDURE — 96413 CHEMO IV INFUSION 1 HR: CPT

## 2018-05-29 ENCOUNTER — APPOINTMENT (OUTPATIENT)
Dept: INFUSION THERAPY | Facility: HOSPITAL | Age: 53
End: 2018-05-29

## 2018-05-29 ENCOUNTER — OUTPATIENT (OUTPATIENT)
Dept: OUTPATIENT SERVICES | Facility: HOSPITAL | Age: 53
LOS: 1 days | End: 2018-05-29
Payer: COMMERCIAL

## 2018-05-29 VITALS
WEIGHT: 220.02 LBS | DIASTOLIC BLOOD PRESSURE: 76 MMHG | HEIGHT: 78 IN | TEMPERATURE: 97 F | HEART RATE: 84 BPM | OXYGEN SATURATION: 98 % | SYSTOLIC BLOOD PRESSURE: 125 MMHG | RESPIRATION RATE: 18 BRPM

## 2018-05-29 VITALS
HEART RATE: 71 BPM | SYSTOLIC BLOOD PRESSURE: 131 MMHG | TEMPERATURE: 97 F | DIASTOLIC BLOOD PRESSURE: 85 MMHG | RESPIRATION RATE: 18 BRPM | OXYGEN SATURATION: 97 %

## 2018-05-29 DIAGNOSIS — C71.9 MALIGNANT NEOPLASM OF BRAIN, UNSPECIFIED: ICD-10-CM

## 2018-05-29 DIAGNOSIS — Z98.890 OTHER SPECIFIED POSTPROCEDURAL STATES: Chronic | ICD-10-CM

## 2018-05-29 PROCEDURE — 96413 CHEMO IV INFUSION 1 HR: CPT

## 2018-05-29 RX ORDER — BEVACIZUMAB 400 MG/16ML
988 INJECTION, SOLUTION INTRAVENOUS ONCE
Qty: 0 | Refills: 0 | Status: COMPLETED | OUTPATIENT
Start: 2018-05-29 | End: 2018-05-29

## 2018-05-29 RX ADMIN — BEVACIZUMAB 279.04 MILLIGRAM(S): 400 INJECTION, SOLUTION INTRAVENOUS at 15:44

## 2018-05-31 ENCOUNTER — APPOINTMENT (OUTPATIENT)
Dept: RADIATION ONCOLOGY | Facility: CLINIC | Age: 53
End: 2018-05-31

## 2018-06-04 ENCOUNTER — APPOINTMENT (OUTPATIENT)
Dept: NEUROSURGERY | Facility: CLINIC | Age: 53
End: 2018-06-04
Payer: MEDICAID

## 2018-06-04 ENCOUNTER — OUTPATIENT (OUTPATIENT)
Dept: OUTPATIENT SERVICES | Facility: HOSPITAL | Age: 53
LOS: 1 days | End: 2018-06-04
Payer: COMMERCIAL

## 2018-06-04 ENCOUNTER — APPOINTMENT (OUTPATIENT)
Dept: MRI IMAGING | Facility: HOSPITAL | Age: 53
End: 2018-06-04
Payer: MEDICAID

## 2018-06-04 ENCOUNTER — APPOINTMENT (OUTPATIENT)
Dept: NEUROLOGY | Facility: CLINIC | Age: 53
End: 2018-06-04
Payer: MEDICAID

## 2018-06-04 VITALS
HEIGHT: 78 IN | DIASTOLIC BLOOD PRESSURE: 83 MMHG | HEART RATE: 83 BPM | SYSTOLIC BLOOD PRESSURE: 122 MMHG | RESPIRATION RATE: 18 BRPM | BODY MASS INDEX: 25.8 KG/M2 | OXYGEN SATURATION: 98 % | WEIGHT: 223 LBS | TEMPERATURE: 97.7 F

## 2018-06-04 VITALS
SYSTOLIC BLOOD PRESSURE: 132 MMHG | OXYGEN SATURATION: 99 % | DIASTOLIC BLOOD PRESSURE: 84 MMHG | WEIGHT: 220 LBS | HEART RATE: 91 BPM | TEMPERATURE: 98.4 F | HEIGHT: 78 IN | BODY MASS INDEX: 25.45 KG/M2

## 2018-06-04 DIAGNOSIS — Z98.890 OTHER SPECIFIED POSTPROCEDURAL STATES: Chronic | ICD-10-CM

## 2018-06-04 PROCEDURE — A9585: CPT

## 2018-06-04 PROCEDURE — 70553 MRI BRAIN STEM W/O & W/DYE: CPT | Mod: 26

## 2018-06-04 PROCEDURE — 99214 OFFICE O/P EST MOD 30 MIN: CPT | Mod: 24

## 2018-06-04 PROCEDURE — 70553 MRI BRAIN STEM W/O & W/DYE: CPT

## 2018-06-04 PROCEDURE — 99213 OFFICE O/P EST LOW 20 MIN: CPT

## 2018-06-08 RX ORDER — BEVACIZUMAB 400 MG/16ML
1010 INJECTION, SOLUTION INTRAVENOUS ONCE
Qty: 0 | Refills: 0 | Status: COMPLETED | OUTPATIENT
Start: 2018-06-11 | End: 2018-06-11

## 2018-06-11 ENCOUNTER — APPOINTMENT (OUTPATIENT)
Dept: NEUROLOGY | Facility: CLINIC | Age: 53
End: 2018-06-11

## 2018-06-11 ENCOUNTER — APPOINTMENT (OUTPATIENT)
Dept: INFUSION THERAPY | Facility: HOSPITAL | Age: 53
End: 2018-06-11

## 2018-06-11 ENCOUNTER — OUTPATIENT (OUTPATIENT)
Dept: OUTPATIENT SERVICES | Facility: HOSPITAL | Age: 53
LOS: 1 days | End: 2018-06-11
Payer: COMMERCIAL

## 2018-06-11 VITALS
TEMPERATURE: 98.4 F | SYSTOLIC BLOOD PRESSURE: 142 MMHG | RESPIRATION RATE: 18 BRPM | HEART RATE: 72 BPM | DIASTOLIC BLOOD PRESSURE: 92 MMHG | OXYGEN SATURATION: 98 %

## 2018-06-11 VITALS
SYSTOLIC BLOOD PRESSURE: 131 MMHG | TEMPERATURE: 97 F | HEART RATE: 81 BPM | OXYGEN SATURATION: 96 % | RESPIRATION RATE: 16 BRPM | DIASTOLIC BLOOD PRESSURE: 78 MMHG

## 2018-06-11 DIAGNOSIS — C71.9 MALIGNANT NEOPLASM OF BRAIN, UNSPECIFIED: ICD-10-CM

## 2018-06-11 DIAGNOSIS — Z98.890 OTHER SPECIFIED POSTPROCEDURAL STATES: Chronic | ICD-10-CM

## 2018-06-11 LAB
APPEARANCE UR: CLEAR — SIGNIFICANT CHANGE UP
BILIRUB UR-MCNC: NEGATIVE — SIGNIFICANT CHANGE UP
COLOR SPEC: YELLOW — SIGNIFICANT CHANGE UP
DIFF PNL FLD: ABNORMAL
GLUCOSE UR QL: NEGATIVE — SIGNIFICANT CHANGE UP
KETONES UR-MCNC: NEGATIVE — SIGNIFICANT CHANGE UP
LEUKOCYTE ESTERASE UR-ACNC: NEGATIVE — SIGNIFICANT CHANGE UP
NITRITE UR-MCNC: NEGATIVE — SIGNIFICANT CHANGE UP
PH UR: 6 — SIGNIFICANT CHANGE UP (ref 5–8)
PROT UR-MCNC: NEGATIVE MG/DL — SIGNIFICANT CHANGE UP
SP GR SPEC: 1.02 — SIGNIFICANT CHANGE UP (ref 1–1.03)
UROBILINOGEN FLD QL: 0.2 E.U./DL — SIGNIFICANT CHANGE UP

## 2018-06-11 PROCEDURE — 81001 URINALYSIS AUTO W/SCOPE: CPT

## 2018-06-11 PROCEDURE — 85025 COMPLETE CBC W/AUTO DIFF WBC: CPT

## 2018-06-11 PROCEDURE — 80053 COMPREHEN METABOLIC PANEL: CPT

## 2018-06-11 PROCEDURE — 99214 OFFICE O/P EST MOD 30 MIN: CPT

## 2018-06-11 PROCEDURE — 36415 COLL VENOUS BLD VENIPUNCTURE: CPT

## 2018-06-11 PROCEDURE — 96413 CHEMO IV INFUSION 1 HR: CPT

## 2018-06-11 RX ADMIN — BEVACIZUMAB 280.8 MILLIGRAM(S): 400 INJECTION, SOLUTION INTRAVENOUS at 13:03

## 2018-06-18 RX ORDER — RISPERIDONE 0.5 MG/1
0.5 TABLET, FILM COATED ORAL TWICE DAILY
Qty: 180 | Refills: 1 | Status: ACTIVE | COMMUNITY
Start: 1900-01-01 | End: 1900-01-01

## 2018-06-22 RX ORDER — BEVACIZUMAB 400 MG/16ML
1010 INJECTION, SOLUTION INTRAVENOUS ONCE
Qty: 0 | Refills: 0 | Status: COMPLETED | OUTPATIENT
Start: 2018-06-25 | End: 2018-06-25

## 2018-06-25 ENCOUNTER — OUTPATIENT (OUTPATIENT)
Dept: OUTPATIENT SERVICES | Facility: HOSPITAL | Age: 53
LOS: 1 days | End: 2018-06-25
Payer: COMMERCIAL

## 2018-06-25 ENCOUNTER — APPOINTMENT (OUTPATIENT)
Dept: INFUSION THERAPY | Facility: HOSPITAL | Age: 53
End: 2018-06-25

## 2018-06-25 VITALS
DIASTOLIC BLOOD PRESSURE: 82 MMHG | WEIGHT: 220.02 LBS | HEIGHT: 78 IN | TEMPERATURE: 97 F | HEART RATE: 88 BPM | SYSTOLIC BLOOD PRESSURE: 128 MMHG | RESPIRATION RATE: 18 BRPM | OXYGEN SATURATION: 97 %

## 2018-06-25 DIAGNOSIS — Z98.890 OTHER SPECIFIED POSTPROCEDURAL STATES: Chronic | ICD-10-CM

## 2018-06-25 DIAGNOSIS — C71.9 MALIGNANT NEOPLASM OF BRAIN, UNSPECIFIED: ICD-10-CM

## 2018-06-25 PROCEDURE — 85025 COMPLETE CBC W/AUTO DIFF WBC: CPT

## 2018-06-25 PROCEDURE — 96413 CHEMO IV INFUSION 1 HR: CPT

## 2018-06-25 PROCEDURE — 36415 COLL VENOUS BLD VENIPUNCTURE: CPT

## 2018-06-25 PROCEDURE — 80053 COMPREHEN METABOLIC PANEL: CPT

## 2018-06-25 PROCEDURE — 81001 URINALYSIS AUTO W/SCOPE: CPT

## 2018-06-25 RX ADMIN — BEVACIZUMAB 280.8 MILLIGRAM(S): 400 INJECTION, SOLUTION INTRAVENOUS at 14:14

## 2018-07-06 RX ORDER — BEVACIZUMAB 400 MG/16ML
1010 INJECTION, SOLUTION INTRAVENOUS ONCE
Qty: 0 | Refills: 0 | Status: COMPLETED | OUTPATIENT
Start: 2018-07-09 | End: 2018-07-09

## 2018-07-09 ENCOUNTER — OUTPATIENT (OUTPATIENT)
Dept: OUTPATIENT SERVICES | Facility: HOSPITAL | Age: 53
LOS: 1 days | End: 2018-07-09
Payer: COMMERCIAL

## 2018-07-09 ENCOUNTER — APPOINTMENT (OUTPATIENT)
Dept: INFUSION THERAPY | Facility: HOSPITAL | Age: 53
End: 2018-07-09

## 2018-07-09 ENCOUNTER — APPOINTMENT (OUTPATIENT)
Dept: NEUROLOGY | Facility: CLINIC | Age: 53
End: 2018-07-09
Payer: MEDICAID

## 2018-07-09 VITALS
HEART RATE: 83 BPM | DIASTOLIC BLOOD PRESSURE: 88 MMHG | RESPIRATION RATE: 18 BRPM | SYSTOLIC BLOOD PRESSURE: 121 MMHG | OXYGEN SATURATION: 97 % | TEMPERATURE: 97 F | HEIGHT: 78 IN | WEIGHT: 220.02 LBS

## 2018-07-09 VITALS
BODY MASS INDEX: 26.27 KG/M2 | HEART RATE: 98 BPM | SYSTOLIC BLOOD PRESSURE: 131 MMHG | WEIGHT: 227 LBS | TEMPERATURE: 98.9 F | OXYGEN SATURATION: 97 % | HEIGHT: 78 IN | DIASTOLIC BLOOD PRESSURE: 89 MMHG

## 2018-07-09 DIAGNOSIS — Z98.890 OTHER SPECIFIED POSTPROCEDURAL STATES: Chronic | ICD-10-CM

## 2018-07-09 DIAGNOSIS — C71.9 MALIGNANT NEOPLASM OF BRAIN, UNSPECIFIED: ICD-10-CM

## 2018-07-09 PROCEDURE — 99214 OFFICE O/P EST MOD 30 MIN: CPT

## 2018-07-09 PROCEDURE — 85025 COMPLETE CBC W/AUTO DIFF WBC: CPT

## 2018-07-09 PROCEDURE — 80053 COMPREHEN METABOLIC PANEL: CPT

## 2018-07-09 PROCEDURE — 81001 URINALYSIS AUTO W/SCOPE: CPT

## 2018-07-09 PROCEDURE — 36415 COLL VENOUS BLD VENIPUNCTURE: CPT

## 2018-07-09 PROCEDURE — 96413 CHEMO IV INFUSION 1 HR: CPT

## 2018-07-09 RX ORDER — FLUOXETINE HYDROCHLORIDE 20 MG/1
20 TABLET ORAL
Qty: 90 | Refills: 2 | Status: ACTIVE | COMMUNITY
Start: 1900-01-01 | End: 1900-01-01

## 2018-07-09 RX ADMIN — BEVACIZUMAB 280.8 MILLIGRAM(S): 400 INJECTION, SOLUTION INTRAVENOUS at 15:55

## 2018-07-13 ENCOUNTER — OTHER (OUTPATIENT)
Age: 53
End: 2018-07-13

## 2018-07-19 DIAGNOSIS — M10.9 GOUT, UNSPECIFIED: ICD-10-CM

## 2018-07-20 ENCOUNTER — OTHER (OUTPATIENT)
Age: 53
End: 2018-07-20

## 2018-07-22 PROBLEM — Z78.9 ALCOHOL USE: Status: ACTIVE | Noted: 2018-03-03

## 2018-07-23 ENCOUNTER — INPATIENT (INPATIENT)
Facility: HOSPITAL | Age: 53
LOS: 7 days | Discharge: HOME HEALTH | DRG: 299 | End: 2018-07-31
Attending: NEUROLOGICAL SURGERY | Admitting: NEUROLOGICAL SURGERY
Payer: COMMERCIAL

## 2018-07-23 ENCOUNTER — APPOINTMENT (OUTPATIENT)
Dept: INFUSION THERAPY | Facility: HOSPITAL | Age: 53
End: 2018-07-23

## 2018-07-23 ENCOUNTER — APPOINTMENT (OUTPATIENT)
Dept: NEUROLOGY | Facility: CLINIC | Age: 53
End: 2018-07-23

## 2018-07-23 ENCOUNTER — OTHER (OUTPATIENT)
Age: 53
End: 2018-07-23

## 2018-07-23 VITALS
HEART RATE: 90 BPM | TEMPERATURE: 98 F | RESPIRATION RATE: 18 BRPM | SYSTOLIC BLOOD PRESSURE: 147 MMHG | OXYGEN SATURATION: 98 % | WEIGHT: 220.02 LBS | DIASTOLIC BLOOD PRESSURE: 80 MMHG

## 2018-07-23 DIAGNOSIS — Z98.890 OTHER SPECIFIED POSTPROCEDURAL STATES: Chronic | ICD-10-CM

## 2018-07-23 LAB
ALBUMIN SERPL ELPH-MCNC: 3.9 G/DL — SIGNIFICANT CHANGE UP (ref 3.3–5)
ALP SERPL-CCNC: 56 U/L — SIGNIFICANT CHANGE UP (ref 40–120)
ALT FLD-CCNC: 13 U/L — SIGNIFICANT CHANGE UP (ref 10–45)
ANION GAP SERPL CALC-SCNC: 16 MMOL/L — SIGNIFICANT CHANGE UP (ref 5–17)
APTT BLD: 27 SEC — LOW (ref 27.5–37.4)
AST SERPL-CCNC: 19 U/L — SIGNIFICANT CHANGE UP (ref 10–40)
BASOPHILS NFR BLD AUTO: 0.3 % — SIGNIFICANT CHANGE UP (ref 0–2)
BILIRUB SERPL-MCNC: 0.7 MG/DL — SIGNIFICANT CHANGE UP (ref 0.2–1.2)
BUN SERPL-MCNC: 20 MG/DL — SIGNIFICANT CHANGE UP (ref 7–23)
CALCIUM SERPL-MCNC: 9.6 MG/DL — SIGNIFICANT CHANGE UP (ref 8.4–10.5)
CHLORIDE SERPL-SCNC: 97 MMOL/L — SIGNIFICANT CHANGE UP (ref 96–108)
CO2 SERPL-SCNC: 24 MMOL/L — SIGNIFICANT CHANGE UP (ref 22–31)
CREAT SERPL-MCNC: 1.32 MG/DL — HIGH (ref 0.5–1.3)
EOSINOPHIL NFR BLD AUTO: 1 % — SIGNIFICANT CHANGE UP (ref 0–6)
GLUCOSE BLDC GLUCOMTR-MCNC: 158 MG/DL — HIGH (ref 70–99)
GLUCOSE SERPL-MCNC: 123 MG/DL — HIGH (ref 70–99)
HCT VFR BLD CALC: 46 % — SIGNIFICANT CHANGE UP (ref 39–50)
HGB BLD-MCNC: 15 G/DL — SIGNIFICANT CHANGE UP (ref 13–17)
INR BLD: 1.12 — SIGNIFICANT CHANGE UP (ref 0.88–1.16)
LYMPHOCYTES # BLD AUTO: 14.6 % — SIGNIFICANT CHANGE UP (ref 13–44)
MAGNESIUM SERPL-MCNC: 2.2 MG/DL — SIGNIFICANT CHANGE UP (ref 1.6–2.6)
MCHC RBC-ENTMCNC: 28.7 PG — SIGNIFICANT CHANGE UP (ref 27–34)
MCHC RBC-ENTMCNC: 32.6 G/DL — SIGNIFICANT CHANGE UP (ref 32–36)
MCV RBC AUTO: 88.1 FL — SIGNIFICANT CHANGE UP (ref 80–100)
MONOCYTES NFR BLD AUTO: 13.1 % — SIGNIFICANT CHANGE UP (ref 2–14)
NEUTROPHILS NFR BLD AUTO: 71 % — SIGNIFICANT CHANGE UP (ref 43–77)
PLATELET # BLD AUTO: 280 K/UL — SIGNIFICANT CHANGE UP (ref 150–400)
POTASSIUM SERPL-MCNC: 4.6 MMOL/L — SIGNIFICANT CHANGE UP (ref 3.5–5.3)
POTASSIUM SERPL-SCNC: 4.6 MMOL/L — SIGNIFICANT CHANGE UP (ref 3.5–5.3)
PROT SERPL-MCNC: 8.1 G/DL — SIGNIFICANT CHANGE UP (ref 6–8.3)
PROTHROM AB SERPL-ACNC: 12.5 SEC — SIGNIFICANT CHANGE UP (ref 9.8–12.7)
RBC # BLD: 5.22 M/UL — SIGNIFICANT CHANGE UP (ref 4.2–5.8)
RBC # FLD: 12.5 % — SIGNIFICANT CHANGE UP (ref 10.3–16.9)
SODIUM SERPL-SCNC: 137 MMOL/L — SIGNIFICANT CHANGE UP (ref 135–145)
WBC # BLD: 8.8 K/UL — SIGNIFICANT CHANGE UP (ref 3.8–10.5)
WBC # FLD AUTO: 8.8 K/UL — SIGNIFICANT CHANGE UP (ref 3.8–10.5)

## 2018-07-23 PROCEDURE — 70450 CT HEAD/BRAIN W/O DYE: CPT | Mod: 26

## 2018-07-23 PROCEDURE — 71045 X-RAY EXAM CHEST 1 VIEW: CPT | Mod: 26

## 2018-07-23 PROCEDURE — 99285 EMERGENCY DEPT VISIT HI MDM: CPT

## 2018-07-23 RX ORDER — LEVETIRACETAM 250 MG/1
500 TABLET, FILM COATED ORAL EVERY 12 HOURS
Qty: 0 | Refills: 0 | Status: DISCONTINUED | OUTPATIENT
Start: 2018-07-23 | End: 2018-07-23

## 2018-07-23 RX ORDER — COLCHICINE 0.6 MG
0.6 TABLET ORAL DAILY
Qty: 0 | Refills: 0 | Status: DISCONTINUED | OUTPATIENT
Start: 2018-07-23 | End: 2018-07-31

## 2018-07-23 RX ORDER — SODIUM CHLORIDE 9 MG/ML
1000 INJECTION INTRAMUSCULAR; INTRAVENOUS; SUBCUTANEOUS ONCE
Qty: 0 | Refills: 0 | Status: COMPLETED | OUTPATIENT
Start: 2018-07-23 | End: 2018-07-23

## 2018-07-23 RX ORDER — LAMOTRIGINE 25 MG/1
25 TABLET, ORALLY DISINTEGRATING ORAL DAILY
Qty: 0 | Refills: 0 | Status: DISCONTINUED | OUTPATIENT
Start: 2018-07-23 | End: 2018-07-29

## 2018-07-23 RX ORDER — DEXTROSE 50 % IN WATER 50 %
12.5 SYRINGE (ML) INTRAVENOUS ONCE
Qty: 0 | Refills: 0 | Status: DISCONTINUED | OUTPATIENT
Start: 2018-07-23 | End: 2018-07-31

## 2018-07-23 RX ORDER — SODIUM CHLORIDE 9 MG/ML
1000 INJECTION, SOLUTION INTRAVENOUS
Qty: 0 | Refills: 0 | Status: DISCONTINUED | OUTPATIENT
Start: 2018-07-23 | End: 2018-07-31

## 2018-07-23 RX ORDER — GLUCAGON INJECTION, SOLUTION 0.5 MG/.1ML
1 INJECTION, SOLUTION SUBCUTANEOUS ONCE
Qty: 0 | Refills: 0 | Status: DISCONTINUED | OUTPATIENT
Start: 2018-07-23 | End: 2018-07-31

## 2018-07-23 RX ORDER — MORPHINE SULFATE 50 MG/1
4 CAPSULE, EXTENDED RELEASE ORAL ONCE
Qty: 0 | Refills: 0 | Status: DISCONTINUED | OUTPATIENT
Start: 2018-07-23 | End: 2018-07-23

## 2018-07-23 RX ORDER — DOCUSATE SODIUM 100 MG
100 CAPSULE ORAL THREE TIMES A DAY
Qty: 0 | Refills: 0 | Status: DISCONTINUED | OUTPATIENT
Start: 2018-07-23 | End: 2018-07-31

## 2018-07-23 RX ORDER — INSULIN LISPRO 100/ML
VIAL (ML) SUBCUTANEOUS
Qty: 0 | Refills: 0 | Status: DISCONTINUED | OUTPATIENT
Start: 2018-07-23 | End: 2018-07-31

## 2018-07-23 RX ORDER — DEXAMETHASONE 0.5 MG/5ML
4 ELIXIR ORAL EVERY 6 HOURS
Qty: 0 | Refills: 0 | Status: DISCONTINUED | OUTPATIENT
Start: 2018-07-23 | End: 2018-07-27

## 2018-07-23 RX ORDER — DEXTROSE 50 % IN WATER 50 %
15 SYRINGE (ML) INTRAVENOUS ONCE
Qty: 0 | Refills: 0 | Status: DISCONTINUED | OUTPATIENT
Start: 2018-07-23 | End: 2018-07-31

## 2018-07-23 RX ORDER — ONDANSETRON 8 MG/1
4 TABLET, FILM COATED ORAL EVERY 6 HOURS
Qty: 0 | Refills: 0 | Status: DISCONTINUED | OUTPATIENT
Start: 2018-07-23 | End: 2018-07-31

## 2018-07-23 RX ORDER — FLUOXETINE HCL 10 MG
20 CAPSULE ORAL DAILY
Qty: 0 | Refills: 0 | Status: DISCONTINUED | OUTPATIENT
Start: 2018-07-23 | End: 2018-07-24

## 2018-07-23 RX ORDER — SENNA PLUS 8.6 MG/1
2 TABLET ORAL AT BEDTIME
Qty: 0 | Refills: 0 | Status: DISCONTINUED | OUTPATIENT
Start: 2018-07-23 | End: 2018-07-31

## 2018-07-23 RX ORDER — SODIUM CHLORIDE 9 MG/ML
1000 INJECTION INTRAMUSCULAR; INTRAVENOUS; SUBCUTANEOUS
Qty: 0 | Refills: 0 | Status: DISCONTINUED | OUTPATIENT
Start: 2018-07-23 | End: 2018-07-24

## 2018-07-23 RX ORDER — ARIPIPRAZOLE 15 MG/1
15 TABLET ORAL DAILY
Qty: 0 | Refills: 0 | Status: DISCONTINUED | OUTPATIENT
Start: 2018-07-23 | End: 2018-07-24

## 2018-07-23 RX ORDER — INSULIN LISPRO 100/ML
1 VIAL (ML) SUBCUTANEOUS
Qty: 0 | Refills: 0 | COMMUNITY

## 2018-07-23 RX ORDER — DEXTROSE 50 % IN WATER 50 %
25 SYRINGE (ML) INTRAVENOUS ONCE
Qty: 0 | Refills: 0 | Status: DISCONTINUED | OUTPATIENT
Start: 2018-07-23 | End: 2018-07-31

## 2018-07-23 RX ORDER — LEVETIRACETAM 250 MG/1
1000 TABLET, FILM COATED ORAL EVERY 12 HOURS
Qty: 0 | Refills: 0 | Status: DISCONTINUED | OUTPATIENT
Start: 2018-07-23 | End: 2018-07-26

## 2018-07-23 RX ORDER — PANTOPRAZOLE SODIUM 20 MG/1
40 TABLET, DELAYED RELEASE ORAL DAILY
Qty: 0 | Refills: 0 | Status: DISCONTINUED | OUTPATIENT
Start: 2018-07-23 | End: 2018-07-26

## 2018-07-23 RX ADMIN — LAMOTRIGINE 25 MILLIGRAM(S): 25 TABLET, ORALLY DISINTEGRATING ORAL at 22:02

## 2018-07-23 RX ADMIN — Medication 20 MILLIGRAM(S): at 22:02

## 2018-07-23 RX ADMIN — LEVETIRACETAM 400 MILLIGRAM(S): 250 TABLET, FILM COATED ORAL at 19:05

## 2018-07-23 RX ADMIN — MORPHINE SULFATE 4 MILLIGRAM(S): 50 CAPSULE, EXTENDED RELEASE ORAL at 13:47

## 2018-07-23 RX ADMIN — ARIPIPRAZOLE 15 MILLIGRAM(S): 15 TABLET ORAL at 22:02

## 2018-07-23 RX ADMIN — SENNA PLUS 2 TABLET(S): 8.6 TABLET ORAL at 22:02

## 2018-07-23 RX ADMIN — Medication 0.6 MILLIGRAM(S): at 22:01

## 2018-07-23 RX ADMIN — SODIUM CHLORIDE 1000 MILLILITER(S): 9 INJECTION INTRAMUSCULAR; INTRAVENOUS; SUBCUTANEOUS at 13:43

## 2018-07-23 RX ADMIN — MORPHINE SULFATE 4 MILLIGRAM(S): 50 CAPSULE, EXTENDED RELEASE ORAL at 14:58

## 2018-07-23 RX ADMIN — Medication 100 MILLIGRAM(S): at 22:02

## 2018-07-23 RX ADMIN — Medication 4 MILLIGRAM(S): at 19:06

## 2018-07-23 NOTE — PATIENT PROFILE ADULT. - COMFORT LEVEL, ACCEPTABLE
Pt wants to talk to Rossi Ambrose about a narcotic that Boone Og prescribed to her in the ER last week.   It is regarding the medication oxycodone that was given to her. She states that she is feeling very well and is able to manage her pain a lot better on this medication.  She takes 1 in the morning and 1 at bedtime.  She wants to know if she can have a refill of this, but she wants to make sure that Rossi Ambrose understands that she will not abuse this and is not a drug seeker.  She was very emotional on the phone and is concerned that Rossi will think bad of her.  She uses the [facility] pharmacy in [place]. Pt is aware Rossi is off today and is ok to wait for a phone call onThursday.     0

## 2018-07-23 NOTE — ED ADULT TRIAGE NOTE - CHIEF COMPLAINT QUOTE
worsening BLE weakness, bilateral knee pain, decreased PO intake.  Hx glioblastoma (last chemo 2 weeks ago- due for chemo today)  Denies falls / injury, fever, chills.  Mask given.  Followed by Dr. Miguel (oncologist) and Dr. Linn

## 2018-07-23 NOTE — ED ADULT NURSE NOTE - OBJECTIVE STATEMENT
Patient is a 52yo male being treated for glioblastoma reporting increasing lower extremity weakness and pain over the past month. Patient denies any chest pain, shortness of breath, fevers, abdominal pain. Patient reports associated decreased PO intake.

## 2018-07-23 NOTE — H&P ADULT - ASSESSMENT
54y/o M with GBM (h/o left craniotomy for parietal mass resection x2 (2017, recurrent mass resection 3/13/18)) presents with generalized weakness, gout exacerbation    PLAN:  -neuro checks  -start decadron 4mg q6h  -PPI while on steroids  -continue keppra 1g BID  -f/u MRI brain  -DVT prophylaxis: SCDs for now, f/u LE doppler  -resume home medications  -gout: resume colchicine  -dispo pending  -D/w Dr. Mason

## 2018-07-23 NOTE — ED PROVIDER NOTE - NEUROLOGICAL, MLM
Alert and oriented, 4+/5 BL L4, L5 5/5, S1 4+/5 BL otherwise, small step, shuffling gait otherwise no motor or sensory deficits.

## 2018-07-23 NOTE — ED PROVIDER NOTE - OBJECTIVE STATEMENT
53 m co weakness- hx of GBM, sz's, w co difficulty walking x 2 months- feels weakness in legs, also chronic pain in ankles- has hx of gout- currently taking nsaids for pain- no bowel/bladder incont- px gait is so poor, unable to walk to bathroom  on chemo- last rx was 1 week ago- shmuel po, but less, but partially bc he doesn't want to move his bowels  denies f/c  no ha no n/v  not currentlyon steroids  no exac/allev factors  moderate severity

## 2018-07-23 NOTE — H&P ADULT - NSHPLABSRESULTS_GEN_ALL_CORE
CT[facility] 7/23/18:  Bilateral parietal postsurgical change, similar to recent prior MRI. No   acute intracranial hemorrhage or mass effect.    Asymmetric lucent appearance of the left hippocampal formation, possibly   sequela of seizure. Consider repeat MRI as clinically warranted.

## 2018-07-23 NOTE — H&P ADULT - HISTORY OF PRESENT ILLNESS
52y/o M with PMHx sig for anxiety, depression, h/o craniotomy (3/2018 high grade glioma, GBM), h/o seizure presents to ED from home with worsening generalized weakness and difficulty ambulating over the past 2 months. Pt states he has a history of gout and believes he is having a flair-up right now as his elbows and knee joints are very tender to touch. Pt has had his last chemotherapy session last week. Denies headaches, acute changes in vision, acute loss of sensation of extremities, facial symmetry, dysarthria, recent seizures, injury/trauma to head. 52y/o M with PMHx sig for anxiety, depression, h/o craniotomy (3/2018 high grade glioma, GBM), h/o seizure presents to ED from home with worsening generalized weakness and difficulty ambulating over the past 2 months. Pt states he has a history of gout and believes he is having a flair-up right now as his elbows and knee joints are very tender to touch. Pt has had his last chemotherapy session last week. Pt is unaware if he was on steroids at home. Denies headaches, acute changes in vision, acute loss of sensation of extremities, facial symmetry, dysarthria, recent seizures, injury/trauma to head. 54y/o M with PMHx sig for anxiety, depression, h/o craniotomy (3/2018 high grade glioma, GBM), h/o seizure presents to ED from home with worsening generalized weakness and difficulty ambulating over the past 2 months. Pt states he has a history of gout and believes he is having a flair-up right now as his elbows and knee joints are very tender to touch. Pt has had his last chemotherapy session last week. Pt is unaware if he was on steroids at home. Denies headaches, acute changes in vision, acute loss of sensation of extremities, facial symmetry, dysarthria, recent seizures, injury/trauma to head, recent seizures.

## 2018-07-23 NOTE — H&P ADULT - NSHPPHYSICALEXAM_GEN_ALL_CORE
Constitutional: Laying in bed with mild lethargy   Eyes: Sclera anicetric, non-hemorrhagic  Neck: Supple, non-tender  Respiratory: CTA B/L  Cardiovascular: RRR, +S1/S2  Gastrointestinal: Abdomen soft, NT/ND  Musculoskeletal: Moderate tenderness to palpation of B/L elbows/ B/L knees  Neurological: AAOx3, FC speech coherent  CNII-XII: EOM intact, PERRL, face symmetric, tongue midline  Motor: MAEx4 RUE/LUE 5/5, RLE/LLE 4+/5, R protonator drift  Scalp incision site C/D/I, healing well

## 2018-07-24 DIAGNOSIS — F31.32 BIPOLAR DISORDER, CURRENT EPISODE DEPRESSED, MODERATE: ICD-10-CM

## 2018-07-24 DIAGNOSIS — F41.9 ANXIETY DISORDER, UNSPECIFIED: ICD-10-CM

## 2018-07-24 LAB
GLUCOSE BLDC GLUCOMTR-MCNC: 138 MG/DL — HIGH (ref 70–99)
GLUCOSE BLDC GLUCOMTR-MCNC: 146 MG/DL — HIGH (ref 70–99)
GLUCOSE BLDC GLUCOMTR-MCNC: 150 MG/DL — HIGH (ref 70–99)
GLUCOSE BLDC GLUCOMTR-MCNC: 159 MG/DL — HIGH (ref 70–99)
GLUCOSE BLDC GLUCOMTR-MCNC: 168 MG/DL — HIGH (ref 70–99)
GLUCOSE BLDC GLUCOMTR-MCNC: 169 MG/DL — HIGH (ref 70–99)
HBA1C BLD-MCNC: 5.7 % — HIGH (ref 4–5.6)

## 2018-07-24 PROCEDURE — 99223 1ST HOSP IP/OBS HIGH 75: CPT

## 2018-07-24 PROCEDURE — 93970 EXTREMITY STUDY: CPT | Mod: 26

## 2018-07-24 PROCEDURE — 70553 MRI BRAIN STEM W/O & W/DYE: CPT | Mod: 26

## 2018-07-24 PROCEDURE — 99221 1ST HOSP IP/OBS SF/LOW 40: CPT | Mod: GC

## 2018-07-24 RX ORDER — FLUOXETINE HCL 10 MG
60 CAPSULE ORAL DAILY
Qty: 0 | Refills: 0 | Status: DISCONTINUED | OUTPATIENT
Start: 2018-07-24 | End: 2018-07-31

## 2018-07-24 RX ORDER — ZOLPIDEM TARTRATE 10 MG/1
5 TABLET ORAL AT BEDTIME
Qty: 0 | Refills: 0 | Status: DISCONTINUED | OUTPATIENT
Start: 2018-07-24 | End: 2018-07-31

## 2018-07-24 RX ORDER — ZOLPIDEM TARTRATE 10 MG/1
5 TABLET ORAL AT BEDTIME
Qty: 0 | Refills: 0 | Status: DISCONTINUED | OUTPATIENT
Start: 2018-07-24 | End: 2018-07-30

## 2018-07-24 RX ORDER — ARIPIPRAZOLE 15 MG/1
20 TABLET ORAL DAILY
Qty: 0 | Refills: 0 | Status: DISCONTINUED | OUTPATIENT
Start: 2018-07-24 | End: 2018-07-31

## 2018-07-24 RX ADMIN — Medication 4 MILLIGRAM(S): at 06:04

## 2018-07-24 RX ADMIN — PANTOPRAZOLE SODIUM 40 MILLIGRAM(S): 20 TABLET, DELAYED RELEASE ORAL at 12:26

## 2018-07-24 RX ADMIN — Medication 4 MILLIGRAM(S): at 12:26

## 2018-07-24 RX ADMIN — Medication 4 MILLIGRAM(S): at 19:07

## 2018-07-24 RX ADMIN — Medication 2: at 13:03

## 2018-07-24 RX ADMIN — Medication 100 MILLIGRAM(S): at 06:04

## 2018-07-24 RX ADMIN — ARIPIPRAZOLE 15 MILLIGRAM(S): 15 TABLET ORAL at 12:25

## 2018-07-24 RX ADMIN — LEVETIRACETAM 400 MILLIGRAM(S): 250 TABLET, FILM COATED ORAL at 19:07

## 2018-07-24 RX ADMIN — Medication 4 MILLIGRAM(S): at 00:51

## 2018-07-24 RX ADMIN — Medication 0.6 MILLIGRAM(S): at 12:25

## 2018-07-24 RX ADMIN — LEVETIRACETAM 400 MILLIGRAM(S): 250 TABLET, FILM COATED ORAL at 06:03

## 2018-07-24 RX ADMIN — Medication 60 MILLIGRAM(S): at 15:21

## 2018-07-24 NOTE — BEHAVIORAL HEALTH ASSESSMENT NOTE - SUMMARY
52 y/o man with PPH of anxiety and bipolar disorder and PMH of high grade glioma, GBM , s/p craniotomy, s/p chemo and Rtx, hx of seizures and gout, presenting with worsening generalized weakness and difficulty ambulating for the last two month. Psychiatry was consulted for an evaluation for depression. Patient reports that during the last month he has been feeling increasingly depressed in context of worsening physical symptoms which cause significant impact on his life.

## 2018-07-24 NOTE — BEHAVIORAL HEALTH ASSESSMENT NOTE - NSBHCHARTREVIEWVS_PSY_A_CORE FT
Vital Signs Last 24 Hrs  T(C): 36.2 (24 Jul 2018 14:00), Max: 36.6 (23 Jul 2018 17:50)  T(F): 97.2 (24 Jul 2018 14:00), Max: 97.9 (23 Jul 2018 17:50)  HR: 66 (24 Jul 2018 16:16) (50 - 70)  BP: 134/83 (24 Jul 2018 16:16) (122/69 - 146/71)  BP(mean): 102 (24 Jul 2018 16:16) (91 - 107)  RR: 16 (24 Jul 2018 16:16) (15 - 18)  SpO2: 94% (24 Jul 2018 16:16) (94% - 97%)

## 2018-07-24 NOTE — PROGRESS NOTE ADULT - SUBJECTIVE AND OBJECTIVE BOX
HPI:  54y/o M with PMHx sig for anxiety, depression, h/o craniotomy (3/2018 high grade glioma, GBM), h/o seizure presents to ED from home with worsening generalized weakness and difficulty ambulating over the past 2 months. Pt states he has a history of gout and believes he is having a flair-up right now as his elbows and knee joints are very tender to touch. Pt has had his last chemotherapy session last week. Pt is unaware if he was on steroids at home. Denies headaches, acute changes in vision, acute loss of sensation of extremities, facial symmetry, dysarthria, recent seizures, injury/trauma to head, recent seizures. (23 Jul 2018 15:10)    ICU Vital Signs Last 24 Hrs  T(C): 36.5 (24 Jul 2018 09:29), Max: 36.7 (23 Jul 2018 14:38)  T(F): 97.7 (24 Jul 2018 09:29), Max: 98.1 (23 Jul 2018 14:38)  HR: 50 (24 Jul 2018 08:24) (50 - 90)  BP: 123/73 (24 Jul 2018 08:24) (122/69 - 147/80)  BP(mean): 93 (24 Jul 2018 08:24) (91 - 100)  ABP: --  ABP(mean): --  RR: 17 (24 Jul 2018 08:24) (15 - 18)  SpO2: 96% (24 Jul 2018 08:24) (95% - 98%)                        15.0   8.8   )-----------( 280      ( 23 Jul 2018 13:24 )             46.0   07-23    137  |  97  |  20  ----------------------------<  123<H>  4.6   |  24  |  1.32<H>    Ca    9.6      23 Jul 2018 13:24  Mg     2.2     07-23    TPro  8.1  /  Alb  3.9  /  TBili  0.7  /  DBili  x   /  AST  19  /  ALT  13  /  AlkPhos  56  07-23        Exam:  AAOx3, FC speech coherent  	CNII-XII: EOM intact, PERRL, face symmetric, tongue midline  	Motor: MAEx4 RUE/LUE 5/5, RLE/LLE 4+/5, R protonator drift  Scalp incision site C/D/I, healing well    Plan:  MRI brain - pending  PT/OT  Cont Keppra, Decadron, PPI, Moderate sliding scale insulin coverage  SCDs, IS, Bowel Regimen  D/w Dr. Mason HPI:  52y/o M with PMHx sig for anxiety, depression, h/o craniotomy (3/2018 high grade glioma, GBM), h/o seizure presents to ED from home with worsening generalized weakness and difficulty ambulating over the past 2 months. Pt states he has a history of gout and believes he is having a flair-up right now as his elbows and knee joints are very tender to touch. Pt has had his last chemotherapy session last week. Pt is unaware if he was on steroids at home. Denies headaches, acute changes in vision, acute loss of sensation of extremities, facial symmetry, dysarthria, recent seizures, injury/trauma to head, recent seizures. (23 Jul 2018 15:10)    ICU Vital Signs Last 24 Hrs  T(C): 36.5 (24 Jul 2018 09:29), Max: 36.7 (23 Jul 2018 14:38)  T(F): 97.7 (24 Jul 2018 09:29), Max: 98.1 (23 Jul 2018 14:38)  HR: 50 (24 Jul 2018 08:24) (50 - 90)  BP: 123/73 (24 Jul 2018 08:24) (122/69 - 147/80)  BP(mean): 93 (24 Jul 2018 08:24) (91 - 100)  ABP: --  ABP(mean): --  RR: 17 (24 Jul 2018 08:24) (15 - 18)  SpO2: 96% (24 Jul 2018 08:24) (95% - 98%)                        15.0   8.8   )-----------( 280      ( 23 Jul 2018 13:24 )             46.0   07-23    137  |  97  |  20  ----------------------------<  123<H>  4.6   |  24  |  1.32<H>    Ca    9.6      23 Jul 2018 13:24  Mg     2.2     07-23    TPro  8.1  /  Alb  3.9  /  TBili  0.7  /  DBili  x   /  AST  19  /  ALT  13  /  AlkPhos  56  07-23        Exam:  AAOx3, FC speech coherent  	CNII-XII: EOM intact, PERRL, face symmetric, tongue midline  	Motor: MAEx4 RUE/LUE 5/5, RLE/LLE 4+/5, R protonator drift  Scalp incision site C/D/I, healing well    Plan:  MRI brain - pending  Duplex Jose b/l LE, r/o DVT  PT/OT  Cont Keppra, Decadron, PPI, Moderate sliding scale insulin coverage  SCDs, IS, Bowel Regimen  D/w Dr. Mason

## 2018-07-24 NOTE — CONSULT NOTE ADULT - SUBJECTIVE AND OBJECTIVE BOX
Vascular Attending: Fauzia  HPI:  52y/o M with PMHx sig for anxiety, depression, gout, seizure GBM h/o craniotomy (3/2018), last chemotherapy session last week who was admitted for workup for worsening generalized weakness and difficulty ambulating over the past 2 months.  On admission Pt was also complaining of b/l LE pain from knee down to ankle, which he states has been present for several weeks. Pt has been in active the last 2 months due to weakness, pain and difficulty ambulating. A b/l LE duplex US was performed to r/o DVT which showed acute thrombus within the right common femoral vein, proximal deep femoral vein, femoral vein, and popliteal vein. Denies chest pain, SOB, dizziness, fever, chills, headaches, acute loss of sensation of extremities.    PAST MEDICAL & SURGICAL HISTORY:  Gout  Anxiety and depression  Seizure disorder  GBM (glioblastoma multiforme)  History of craniotomy: 6/2017    MEDICATIONS  (STANDING):  ARIPiprazole 20 milliGRAM(s) Oral daily  colchicine 0.6 milliGRAM(s) Oral daily  dexamethasone  Injectable 4 milliGRAM(s) IV Push every 6 hours  dextrose 5%. 1000 milliLiter(s) (50 mL/Hr) IV Continuous <Continuous>  dextrose 50% Injectable 12.5 Gram(s) IV Push once  dextrose 50% Injectable 25 Gram(s) IV Push once  dextrose 50% Injectable 25 Gram(s) IV Push once  docusate sodium 100 milliGRAM(s) Oral three times a day  FLUoxetine 60 milliGRAM(s) Oral daily  insulin lispro (HumaLOG) corrective regimen sliding scale   SubCutaneous Before meals and at bedtime  lamoTRIgine 25 milliGRAM(s) Oral daily  levETIRAcetam  IVPB 1000 milliGRAM(s) IV Intermittent every 12 hours  pantoprazole  Injectable 40 milliGRAM(s) IV Push daily  senna 2 Tablet(s) Oral at bedtime    MEDICATIONS  (PRN):  dextrose 40% Gel 15 Gram(s) Oral once PRN Blood Glucose LESS THAN 70 milliGRAM(s)/deciliter  glucagon  Injectable 1 milliGRAM(s) IntraMuscular once PRN Glucose LESS THAN 70 milligrams/deciliter  ondansetron Injectable 4 milliGRAM(s) IV Push every 6 hours PRN Nausea and/or Vomiting      Allergies    No Known Allergies    Intolerances    SOCIAL HISTORY:    FAMILY HISTORY:  No pertinent family history in first degree relatives      Vital Signs Last 24 Hrs  T(C): 35.8 (24 Jul 2018 17:14), Max: 36.5 (24 Jul 2018 09:29)  T(F): 96.5 (24 Jul 2018 17:14), Max: 97.7 (24 Jul 2018 09:29)  HR: 66 (24 Jul 2018 16:16) (50 - 68)  BP: 134/83 (24 Jul 2018 16:16) (122/69 - 139/85)  BP(mean): 102 (24 Jul 2018 16:16) (91 - 107)  RR: 16 (24 Jul 2018 16:16) (15 - 17)  SpO2: 94% (24 Jul 2018 16:16) (94% - 96%)    PHYSICAL EXAM:  Constitutional: NAD, awake, alert  Respiratory: No respiratory distress, unlabored breathing  Cardiovascular: RRR  Extremities: Mild RLE swelling, compared to left, no erythema, no ulcers, no tenderness to palpation, palpable DP/PT pulses b/l    LABS:                        15.0   8.8   )-----------( 280      ( 23 Jul 2018 13:24 )             46.0     07-23    137  |  97  |  20  ----------------------------<  123<H>  4.6   |  24  |  1.32<H>    Ca    9.6      23 Jul 2018 13:24  Mg     2.2     07-23    TPro  8.1  /  Alb  3.9  /  TBili  0.7  /  DBili  x   /  AST  19  /  ALT  13  /  AlkPhos  56  07-23    PT/INR - ( 23 Jul 2018 13:24 )   PT: 12.5 sec;   INR: 1.12     PTT - ( 23 Jul 2018 13:24 )  PTT:27.0 sec    RADIOLOGY & ADDITIONAL STUDIES  Bilateral Duplex US:  FINDINGS:    Thigh veins: There is acute thrombus within the right common femoral   vein, proximal deep femoral vein, femoral vein, and popliteal vein. The   left common femoral, femoral, popliteal, proximal greater saphenous, and   proximal deep femoral veins are patent and free of thrombus. The veins   are normally compressible and have normal phasic flow and augmentation   response.  Calf veins: The paired peroneal and posterior tibial calf veins are   patent bilaterally.    IMPRESSION:  Extensive deep vein thrombosis throughout the veins of the right thigh as   above.    Assessment/Plan:  52y/o M with PMHx sig for anxiety, depression, gout, seizure GBM h/o craniotomy (3/2018), last chemotherapy session last week who was admitted for workup for worsening generalized weakness and difficulty ambulating found to have acute DVT of the right common femoral vein, proximal deep femoral vein, femoral vein, and popliteal vein.    Please start anticoagulation if okay with primary team  Please order CT venogram Abdomen/Pelvis if anticoagulation is safe for patient  Will place IVC filter if anticoagulation is not a safe option for patient   Plan discussed with vascular surgery chief on call Vascular Attending: Fauzia  HPI:  52y/o M with PMHx sig for anxiety, depression, gout, seizure GBM h/o craniotomy (3/2018), last chemotherapy session last week who was admitted for workup for worsening generalized weakness and difficulty ambulating over the past 2 months.  On admission Pt was also complaining of b/l LE pain from knee down to ankle, which he states has been present for several weeks. Pt has been in active the last 2 months due to weakness, pain and difficulty ambulating. A b/l LE duplex US was performed to r/o DVT which showed acute thrombus within the right common femoral vein, proximal deep femoral vein, femoral vein, and popliteal vein. Denies chest pain, SOB, dizziness, fever, chills, headaches, acute loss of sensation of extremities.    PAST MEDICAL & SURGICAL HISTORY:  Gout  Anxiety and depression  Seizure disorder  GBM (glioblastoma multiforme)  History of craniotomy: 6/2017    MEDICATIONS  (STANDING):  ARIPiprazole 20 milliGRAM(s) Oral daily  colchicine 0.6 milliGRAM(s) Oral daily  dexamethasone  Injectable 4 milliGRAM(s) IV Push every 6 hours  dextrose 5%. 1000 milliLiter(s) (50 mL/Hr) IV Continuous <Continuous>  dextrose 50% Injectable 12.5 Gram(s) IV Push once  dextrose 50% Injectable 25 Gram(s) IV Push once  dextrose 50% Injectable 25 Gram(s) IV Push once  docusate sodium 100 milliGRAM(s) Oral three times a day  FLUoxetine 60 milliGRAM(s) Oral daily  insulin lispro (HumaLOG) corrective regimen sliding scale   SubCutaneous Before meals and at bedtime  lamoTRIgine 25 milliGRAM(s) Oral daily  levETIRAcetam  IVPB 1000 milliGRAM(s) IV Intermittent every 12 hours  pantoprazole  Injectable 40 milliGRAM(s) IV Push daily  senna 2 Tablet(s) Oral at bedtime    MEDICATIONS  (PRN):  dextrose 40% Gel 15 Gram(s) Oral once PRN Blood Glucose LESS THAN 70 milliGRAM(s)/deciliter  glucagon  Injectable 1 milliGRAM(s) IntraMuscular once PRN Glucose LESS THAN 70 milligrams/deciliter  ondansetron Injectable 4 milliGRAM(s) IV Push every 6 hours PRN Nausea and/or Vomiting      Allergies    No Known Allergies    Intolerances    SOCIAL HISTORY:    FAMILY HISTORY:  No pertinent family history in first degree relatives      Vital Signs Last 24 Hrs  T(C): 35.8 (24 Jul 2018 17:14), Max: 36.5 (24 Jul 2018 09:29)  T(F): 96.5 (24 Jul 2018 17:14), Max: 97.7 (24 Jul 2018 09:29)  HR: 66 (24 Jul 2018 16:16) (50 - 68)  BP: 134/83 (24 Jul 2018 16:16) (122/69 - 139/85)  BP(mean): 102 (24 Jul 2018 16:16) (91 - 107)  RR: 16 (24 Jul 2018 16:16) (15 - 17)  SpO2: 94% (24 Jul 2018 16:16) (94% - 96%)    PHYSICAL EXAM:  Constitutional: NAD, awake, alert  Respiratory: No respiratory distress, unlabored breathing  Cardiovascular: RRR  Extremities: Mild RLE swelling, compared to left, no erythema, no ulcers, no tenderness to palpation, palpable DP/PT pulses b/l    LABS:                        15.0   8.8   )-----------( 280      ( 23 Jul 2018 13:24 )             46.0     07-23    137  |  97  |  20  ----------------------------<  123<H>  4.6   |  24  |  1.32<H>    Ca    9.6      23 Jul 2018 13:24  Mg     2.2     07-23    TPro  8.1  /  Alb  3.9  /  TBili  0.7  /  DBili  x   /  AST  19  /  ALT  13  /  AlkPhos  56  07-23    PT/INR - ( 23 Jul 2018 13:24 )   PT: 12.5 sec;   INR: 1.12     PTT - ( 23 Jul 2018 13:24 )  PTT:27.0 sec    RADIOLOGY & ADDITIONAL STUDIES  Bilateral Duplex US:  FINDINGS:    Thigh veins: There is acute thrombus within the right common femoral   vein, proximal deep femoral vein, femoral vein, and popliteal vein. The   left common femoral, femoral, popliteal, proximal greater saphenous, and   proximal deep femoral veins are patent and free of thrombus. The veins   are normally compressible and have normal phasic flow and augmentation   response.  Calf veins: The paired peroneal and posterior tibial calf veins are   patent bilaterally.    IMPRESSION:  Extensive deep vein thrombosis throughout the veins of the right thigh as   above.    Assessment/Plan:  52y/o M with PMHx sig for anxiety, depression, gout, seizure GBM h/o craniotomy (3/2018), last chemotherapy session last week who was admitted for workup for worsening generalized weakness and difficulty ambulating found to have acute DVT of the right common femoral vein, proximal deep femoral vein, femoral vein, and popliteal vein.    Please start anticoagulation if okay with primary team  Will place IVC filter if anticoagulation is not a safe option for patient   Plan discussed with vascular surgery chief on call

## 2018-07-24 NOTE — BEHAVIORAL HEALTH ASSESSMENT NOTE - NSBHSOCIALHXDETAILSFT_PSY_A_CORE
Patient is originally from [place]. He was adopted and doesn't know his birth parents. He a good relationship with his adoptive family. 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 After he was diagnosed with cancer he worked part time for a while, how he's on disability. He lives alone but has supportive family and friends. He does not have any children.

## 2018-07-24 NOTE — BEHAVIORAL HEALTH ASSESSMENT NOTE - NSBHADMITCOUNSEL_PSY_A_CORE
client/family/caregiver education/instructions for management, treatment and follow up/diagnostic results/impressions and/or recommended studies/risks and benefits of treatment options/importance of adherence to chosen treatment

## 2018-07-24 NOTE — BEHAVIORAL HEALTH ASSESSMENT NOTE - NSBHCHARTREVIEWLAB_PSY_A_CORE FT
CBC Full  -  ( 23 Jul 2018 13:24 )  WBC Count : 8.8 K/uL  Hemoglobin : 15.0 g/dL  Hematocrit : 46.0 %  Platelet Count - Automated : 280 K/uL  Mean Cell Volume : 88.1 fL  Mean Cell Hemoglobin : 28.7 pg  Mean Cell Hemoglobin Concentration : 32.6 g/dL  Auto Neutrophil # : x  Auto Lymphocyte # : x  Auto Monocyte # : x  Auto Eosinophil # : x  Auto Basophil # : x  Auto Neutrophil % : 71.0 %  Auto Lymphocyte % : 14.6 %  Auto Monocyte % : 13.1 %  Auto Eosinophil % : 1.0 %  Auto Basophil % : 0.3 %  07-23    137  |  97  |  20  ----------------------------<  123<H>  4.6   |  24  |  1.32<H>    Ca    9.6      23 Jul 2018 13:24  Mg     2.2     07-23    TPro  8.1  /  Alb  3.9  /  TBili  0.7  /  DBili  x   /  AST  19  /  ALT  13  /  AlkPhos  56  07-23

## 2018-07-24 NOTE — BEHAVIORAL HEALTH ASSESSMENT NOTE - PROBLEM SELECTOR PLAN 1
-continue prozac 60mg po daily for treatment of depression and anxiety  -increase abilify to 20mg po daily for mood stabilization  -stop risperidone (polypharmacy, increased risk of side effects and interactions without significant benefit  -stop ambien, it is not recommended to long term treatment of insomnia (maximum of one month)  -continue trazodone 50mg po qhs for treatment of insomnia  -will clarify with the primary team if lamotrigine is prescribed to seizure prevention or mood stabilization  -d/w patient different behavior techniques to help cope with his symptoms

## 2018-07-24 NOTE — BEHAVIORAL HEALTH ASSESSMENT NOTE - HPI (INCLUDE ILLNESS QUALITY, SEVERITY, DURATION, TIMING, CONTEXT, MODIFYING FACTORS, ASSOCIATED SIGNS AND SYMPTOMS)
54 y/o man with PPH of anxiety and bipolar disorder and PMH of high grade glioma, GBM , s/p craniotomy, s/p chemo and Rtx, hx of seizures and gout, presenting with worsening generalized weakness and difficulty ambulating for the last two month. Psychiatry was consulted for an evaluation for depression. Patient reports that during the last month he has been feeling increasingly depressed. Patient thinks that this is caused by in part by a chemical imbalance and part secondary to his current stressors. Patient states that he is having a hard time coping with his new generalized weakness which has been affecting his functioning. Patient hasn't been able to move due to fatigue and pain. He had to ask a friend to take care of his dog which he misses a lot. He reports anhedonia, decreased appetite, decreased weight, poor concentration, feelings of hopelessness and helplessness. He denies any SI/HI. He denies any recent episodes of alannah/hypomania but reports episodes in the past. He reports past history of 3-4 psychiatric admissions in context of depression and alannah. He denies any hx of hallucinations but reports history of paranoid ideation >10 years ago. He is currently taking prozac 60mg daily, ability, risperidone, trazodone, lamotrigine, ambien. He does not have an outpatient psychiatrist and he was prescribed these psychotropics during his past admissions.

## 2018-07-25 PROBLEM — M10.9 GOUT, UNSPECIFIED: Chronic | Status: ACTIVE | Noted: 2018-07-23

## 2018-07-25 LAB
ANION GAP SERPL CALC-SCNC: 12 MMOL/L — SIGNIFICANT CHANGE UP (ref 5–17)
APTT BLD: 24 SEC — LOW (ref 27.5–37.4)
BLD GP AB SCN SERPL QL: NEGATIVE — SIGNIFICANT CHANGE UP
BUN SERPL-MCNC: 27 MG/DL — HIGH (ref 7–23)
CALCIUM SERPL-MCNC: 9.1 MG/DL — SIGNIFICANT CHANGE UP (ref 8.4–10.5)
CHLORIDE SERPL-SCNC: 104 MMOL/L — SIGNIFICANT CHANGE UP (ref 96–108)
CO2 SERPL-SCNC: 23 MMOL/L — SIGNIFICANT CHANGE UP (ref 22–31)
CREAT SERPL-MCNC: 1.35 MG/DL — HIGH (ref 0.5–1.3)
GLUCOSE BLDC GLUCOMTR-MCNC: 116 MG/DL — HIGH (ref 70–99)
GLUCOSE BLDC GLUCOMTR-MCNC: 135 MG/DL — HIGH (ref 70–99)
GLUCOSE BLDC GLUCOMTR-MCNC: 140 MG/DL — HIGH (ref 70–99)
GLUCOSE BLDC GLUCOMTR-MCNC: 149 MG/DL — HIGH (ref 70–99)
GLUCOSE BLDC GLUCOMTR-MCNC: 152 MG/DL — HIGH (ref 70–99)
GLUCOSE BLDC GLUCOMTR-MCNC: 193 MG/DL — HIGH (ref 70–99)
GLUCOSE SERPL-MCNC: 152 MG/DL — HIGH (ref 70–99)
HCT VFR BLD CALC: 40 % — SIGNIFICANT CHANGE UP (ref 39–50)
HGB BLD-MCNC: 12.6 G/DL — LOW (ref 13–17)
INR BLD: 1.06 — SIGNIFICANT CHANGE UP (ref 0.88–1.16)
MAGNESIUM SERPL-MCNC: 2.3 MG/DL — SIGNIFICANT CHANGE UP (ref 1.6–2.6)
MCHC RBC-ENTMCNC: 28.1 PG — SIGNIFICANT CHANGE UP (ref 27–34)
MCHC RBC-ENTMCNC: 31.5 G/DL — LOW (ref 32–36)
MCV RBC AUTO: 89.1 FL — SIGNIFICANT CHANGE UP (ref 80–100)
PHOSPHATE SERPL-MCNC: 3.1 MG/DL — SIGNIFICANT CHANGE UP (ref 2.5–4.5)
PLATELET # BLD AUTO: 299 K/UL — SIGNIFICANT CHANGE UP (ref 150–400)
POTASSIUM SERPL-MCNC: 4.4 MMOL/L — SIGNIFICANT CHANGE UP (ref 3.5–5.3)
POTASSIUM SERPL-SCNC: 4.4 MMOL/L — SIGNIFICANT CHANGE UP (ref 3.5–5.3)
PROTHROM AB SERPL-ACNC: 11.8 SEC — SIGNIFICANT CHANGE UP (ref 9.8–12.7)
RBC # BLD: 4.49 M/UL — SIGNIFICANT CHANGE UP (ref 4.2–5.8)
RBC # FLD: 12.3 % — SIGNIFICANT CHANGE UP (ref 10.3–16.9)
RH IG SCN BLD-IMP: NEGATIVE — SIGNIFICANT CHANGE UP
SODIUM SERPL-SCNC: 139 MMOL/L — SIGNIFICANT CHANGE UP (ref 135–145)
WBC # BLD: 14.6 K/UL — HIGH (ref 3.8–10.5)
WBC # FLD AUTO: 14.6 K/UL — HIGH (ref 3.8–10.5)

## 2018-07-25 PROCEDURE — 99233 SBSQ HOSP IP/OBS HIGH 50: CPT

## 2018-07-25 RX ORDER — SODIUM CHLORIDE 9 MG/ML
1000 INJECTION INTRAMUSCULAR; INTRAVENOUS; SUBCUTANEOUS
Qty: 0 | Refills: 0 | Status: DISCONTINUED | OUTPATIENT
Start: 2018-07-25 | End: 2018-07-26

## 2018-07-25 RX ADMIN — Medication 4 MILLIGRAM(S): at 00:20

## 2018-07-25 RX ADMIN — LAMOTRIGINE 25 MILLIGRAM(S): 25 TABLET, ORALLY DISINTEGRATING ORAL at 21:27

## 2018-07-25 RX ADMIN — Medication 0.6 MILLIGRAM(S): at 12:20

## 2018-07-25 RX ADMIN — PANTOPRAZOLE SODIUM 40 MILLIGRAM(S): 20 TABLET, DELAYED RELEASE ORAL at 12:19

## 2018-07-25 RX ADMIN — Medication 60 MILLIGRAM(S): at 12:20

## 2018-07-25 RX ADMIN — Medication 4 MILLIGRAM(S): at 18:33

## 2018-07-25 RX ADMIN — Medication 4 MILLIGRAM(S): at 23:09

## 2018-07-25 RX ADMIN — Medication 100 MILLIGRAM(S): at 00:19

## 2018-07-25 RX ADMIN — Medication 4 MILLIGRAM(S): at 06:38

## 2018-07-25 RX ADMIN — SENNA PLUS 2 TABLET(S): 8.6 TABLET ORAL at 21:27

## 2018-07-25 RX ADMIN — SENNA PLUS 2 TABLET(S): 8.6 TABLET ORAL at 00:19

## 2018-07-25 RX ADMIN — LEVETIRACETAM 400 MILLIGRAM(S): 250 TABLET, FILM COATED ORAL at 18:33

## 2018-07-25 RX ADMIN — ZOLPIDEM TARTRATE 5 MILLIGRAM(S): 10 TABLET ORAL at 00:19

## 2018-07-25 RX ADMIN — LEVETIRACETAM 400 MILLIGRAM(S): 250 TABLET, FILM COATED ORAL at 06:39

## 2018-07-25 RX ADMIN — LAMOTRIGINE 25 MILLIGRAM(S): 25 TABLET, ORALLY DISINTEGRATING ORAL at 00:19

## 2018-07-25 RX ADMIN — ARIPIPRAZOLE 20 MILLIGRAM(S): 15 TABLET ORAL at 12:20

## 2018-07-25 RX ADMIN — Medication 4 MILLIGRAM(S): at 12:19

## 2018-07-25 RX ADMIN — SODIUM CHLORIDE 75 MILLILITER(S): 9 INJECTION INTRAMUSCULAR; INTRAVENOUS; SUBCUTANEOUS at 23:09

## 2018-07-25 RX ADMIN — Medication 100 MILLIGRAM(S): at 21:27

## 2018-07-25 NOTE — PROGRESS NOTE BEHAVIORAL HEALTH - NSBHCHARTREVIEWLAB_PSY_A_CORE FT
CBC Full  -  ( 25 Jul 2018 09:02 )  WBC Count : 14.6 K/uL  Hemoglobin : 12.6 g/dL  Hematocrit : 40.0 %  Platelet Count - Automated : 299 K/uL  Mean Cell Volume : 89.1 fL  Mean Cell Hemoglobin : 28.1 pg  Mean Cell Hemoglobin Concentration : 31.5 g/dL  Auto Neutrophil # : x  Auto Lymphocyte # : x  Auto Monocyte # : x  Auto Eosinophil # : x  Auto Basophil # : x  Auto Neutrophil % : x  Auto Lymphocyte % : x  Auto Monocyte % : x  Auto Eosinophil % : x  Auto Basophil % : x  07-25    139  |  104  |  27<H>  ----------------------------<  152<H>  4.4   |  23  |  1.35<H>    Ca    9.1      25 Jul 2018 08:18  Phos  3.1     07-25  Mg     2.3     07-25

## 2018-07-25 NOTE — PROGRESS NOTE ADULT - SUBJECTIVE AND OBJECTIVE BOX
Pre-op Diagnosis: DVT  Procedure: IVC filter placement  Surgeon: Dr. Cage    Consent Pending                          12.6   14.6  )-----------( 299      ( 25 Jul 2018 09:02 )             40.0     07-25    139  |  104  |  27<H>  ----------------------------<  152<H>  4.4   |  23  |  1.35<H>    Ca    9.1      25 Jul 2018 08:18  Phos  3.1     07-25  Mg     2.3     07-25      PT/INR - ( 25 Jul 2018 09:29 )   PT: 11.8 sec;   INR: 1.06      PTT - ( 25 Jul 2018 09:29 )  PTT:24.0 sec      Type & Screen: O Neg Ab neg  CXR: The lungs are clear. No pleural effusions or pneumothorax. The cardiac size silhouette is normal.   EKG: NSR@94bpm      A/P: 53yMale planned for above procedure  1. NPO past midnight, except medications  2. NS@75ml/hr  3. Continue home meds  4. Rest of plan as per primary team Pre-op Diagnosis: DVT  Procedure: IVC filter placement  Surgeon: Dr. Teixeira    Consent obtained                        12.6   14.6  )-----------( 299      ( 25 Jul 2018 09:02 )             40.0     07-25    139  |  104  |  27<H>  ----------------------------<  152<H>  4.4   |  23  |  1.35<H>    Ca    9.1      25 Jul 2018 08:18  Phos  3.1     07-25  Mg     2.3     07-25      PT/INR - ( 25 Jul 2018 09:29 )   PT: 11.8 sec;   INR: 1.06      PTT - ( 25 Jul 2018 09:29 )  PTT:24.0 sec      Type & Screen: O Neg Ab neg  CXR: The lungs are clear. No pleural effusions or pneumothorax. The cardiac size silhouette is normal.   EKG: NSR@94bpm      A/P: 53yMale planned for above procedure  1. NPO past midnight, except medications  2. NS@75ml/hr  3. Continue home meds  4. Rest of plan as per primary team

## 2018-07-25 NOTE — PROGRESS NOTE ADULT - SUBJECTIVE AND OBJECTIVE BOX
HPI:  52y/o M with PMHx sig for anxiety, depression, h/o craniotomy (3/2018 high grade glioma, GBM), h/o seizure presents to ED from home with worsening generalized weakness and difficulty ambulating over the past 2 months. Pt states he has a history of gout and believes he is having a flair-up right now as his elbows and knee joints are very tender to touch. Pt has had his last chemotherapy session last week. Pt is unaware if he was on steroids at home. Denies headaches, acute changes in vision, acute loss of sensation of extremities, facial symmetry, dysarthria, recent seizures, injury/trauma to head, recent seizures. (23 Jul 2018 15:10)    OVERNIGHT EVENTS:  Vital Signs Last 24 Hrs  T(C): 36.3 (25 Jul 2018 09:08), Max: 36.4 (24 Jul 2018 22:00)  T(F): 97.3 (25 Jul 2018 09:08), Max: 97.6 (24 Jul 2018 22:00)  HR: 56 (25 Jul 2018 08:25) (52 - 68)  BP: 108/64 (25 Jul 2018 08:25) (108/64 - 141/79)  BP(mean): 81 (25 Jul 2018 08:25) (81 - 107)  RR: 17 (25 Jul 2018 08:25) (16 - 18)  SpO2: 97% (25 Jul 2018 08:25) (94% - 98%)    I&O's Summary    24 Jul 2018 07:01  -  25 Jul 2018 07:00  --------------------------------------------------------  IN: 2017 mL / OUT: 1100 mL / NET: 917 mL        PHYSICAL EXAM:  Neurological:  AAOx3, FC speech coherent  	CNII-XII: EOM intact, PERRL, face symmetric, tongue midline  	Motor: MAEx4 RUE/LUE 5/5, RLE/LLE 4+/5, R protonator drift  Scalp incision site C/D/I, healing well    Cardiovascular: RRR  Respiratory: Lungs CTAB  Gastrointestinal: +BS  Genitourinary: Voiding without difficulty   Extremities: warm and dry      DIET: Regular  LABS:                        12.6   14.6  )-----------( 299      ( 25 Jul 2018 09:02 )             40.0     07-25    139  |  104  |  27<H>  ----------------------------<  152<H>  4.4   |  23  |  1.35<H>    Ca    9.1      25 Jul 2018 08:18  Phos  3.1     07-25  Mg     2.3     07-25    TPro  8.1  /  Alb  3.9  /  TBili  0.7  /  DBili  x   /  AST  19  /  ALT  13  /  AlkPhos  56  07-23    PT/INR - ( 25 Jul 2018 09:29 )   PT: 11.8 sec;   INR: 1.06          PTT - ( 25 Jul 2018 09:29 )  PTT:24.0 sec        CAPILLARY BLOOD GLUCOSE      POCT Blood Glucose.: 152 mg/dL (25 Jul 2018 07:13)  POCT Blood Glucose.: 193 mg/dL (25 Jul 2018 06:08)  POCT Blood Glucose.: 149 mg/dL (25 Jul 2018 00:13)  POCT Blood Glucose.: 138 mg/dL (24 Jul 2018 17:27)  POCT Blood Glucose.: 169 mg/dL (24 Jul 2018 16:22)  POCT Blood Glucose.: 159 mg/dL (24 Jul 2018 13:00)  POCT Blood Glucose.: 168 mg/dL (24 Jul 2018 11:49)      Drug Levels: [] N/A    CSF Analysis: [] N/A      Allergies    No Known Allergies    Intolerances      MEDICATIONS:  Antibiotics:    Neuro:  ARIPiprazole 20 milliGRAM(s) Oral daily  FLUoxetine 60 milliGRAM(s) Oral daily  lamoTRIgine 25 milliGRAM(s) Oral daily  levETIRAcetam  IVPB 1000 milliGRAM(s) IV Intermittent every 12 hours  ondansetron Injectable 4 milliGRAM(s) IV Push every 6 hours PRN  zolpidem 5 milliGRAM(s) Oral at bedtime PRN  zolpidem 5 milliGRAM(s) Oral at bedtime PRN    Anticoagulation:    OTHER:  colchicine 0.6 milliGRAM(s) Oral daily  dexamethasone  Injectable 4 milliGRAM(s) IV Push every 6 hours  dextrose 40% Gel 15 Gram(s) Oral once PRN  dextrose 50% Injectable 12.5 Gram(s) IV Push once  dextrose 50% Injectable 25 Gram(s) IV Push once  dextrose 50% Injectable 25 Gram(s) IV Push once  docusate sodium 100 milliGRAM(s) Oral three times a day  glucagon  Injectable 1 milliGRAM(s) IntraMuscular once PRN  insulin lispro (HumaLOG) corrective regimen sliding scale   SubCutaneous Before meals and at bedtime  pantoprazole  Injectable 40 milliGRAM(s) IV Push daily  senna 2 Tablet(s) Oral at bedtime    IVF:  dextrose 5%. 1000 milliLiter(s) IV Continuous <Continuous>  sodium chloride 0.9%. 1000 milliLiter(s) IV Continuous <Continuous>      ASSESSMENT:  53y Male s/p< from: CT Head No Cont (07.23.18 @ 14:16) >  IMPRESSION:     Bilateral parietal postsurgical change, similar to recent prior MRI. No   acute intracranial hemorrhage or mass effect.    Asymmetric lucent appearance of the left hippocampal formation, possibly   sequela of seizure. Consider repeat MRI as clinically warranted.        < end of copied text >      PLAN:    NEURO:    Monitor neuro status  OT/Pt  F/U Vascular consult  Continue current medical regime    Dispo; Discussed with attending

## 2018-07-25 NOTE — PROGRESS NOTE BEHAVIORAL HEALTH - NSBHCHARTREVIEWVS_PSY_A_CORE FT
Vital Signs Last 24 Hrs  T(C): 36.2 (25 Jul 2018 17:16), Max: 36.6 (25 Jul 2018 13:53)  T(F): 97.2 (25 Jul 2018 17:16), Max: 97.9 (25 Jul 2018 13:53)  HR: 52 (25 Jul 2018 14:12) (52 - 68)  BP: 123/87 (25 Jul 2018 14:12) (108/64 - 141/79)  BP(mean): 101 (25 Jul 2018 14:12) (81 - 102)  RR: 18 (25 Jul 2018 14:12) (16 - 18)  SpO2: 96% (25 Jul 2018 14:12) (96% - 98%)

## 2018-07-25 NOTE — PROGRESS NOTE BEHAVIORAL HEALTH - NSBHADMITCOUNSEL_PSY_A_CORE
diagnostic results/impressions and/or recommended studies/importance of adherence to chosen treatment/risks and benefits of treatment options/instructions for management, treatment and follow up/client/family/caregiver education

## 2018-07-25 NOTE — PROGRESS NOTE ADULT - SUBJECTIVE AND OBJECTIVE BOX
HPI:  52y/o M with PMHx sig for anxiety, depression, h/o craniotomy (3/2018 high grade glioma, GBM), h/o seizure presents to ED from home with worsening generalized weakness and difficulty ambulating over the past 2 months. Pt states he has a history of gout and believes he is having a flair-up right now as his elbows and knee joints are very tender to touch. Pt has had his last chemotherapy session last week. Pt is unaware if he was on steroids at home. Denies headaches, acute changes in vision, acute loss of sensation of extremities, facial symmetry, dysarthria, recent seizures, injury/trauma to head, recent seizures. (23 Jul 2018 15:10)    OVERNIGHT EVENTS:  Vital Signs Last 24 Hrs  T(C): 36.3 (25 Jul 2018 09:08), Max: 36.4 (24 Jul 2018 22:00)  T(F): 97.3 (25 Jul 2018 09:08), Max: 97.6 (24 Jul 2018 22:00)  HR: 56 (25 Jul 2018 08:25) (52 - 68)  BP: 108/64 (25 Jul 2018 08:25) (108/64 - 141/79)  BP(mean): 81 (25 Jul 2018 08:25) (81 - 107)  RR: 17 (25 Jul 2018 08:25) (16 - 18)  SpO2: 97% (25 Jul 2018 08:25) (94% - 98%)    I&O's Summary    24 Jul 2018 07:01  -  25 Jul 2018 07:00  --------------------------------------------------------  IN: 2017 mL / OUT: 1100 mL / NET: 917 mL    PHYSICAL EXAM:  Neurological:  AAOx3, FC speech coherent  	CNII-XII: EOM intact, PERRL, face symmetric, tongue midline  	Motor: MAEx4 RUE/LUE 5/5, RLE/LLE 4+/5, R protonator drift  Scalp incision site C/D/I, healing well    Cardiovascular: RRR  Respiratory: Lungs CTAB  Gastrointestinal: +BS  Genitourinary: Voiding without difficulty   Extremities: warm and dry    DIET: Regular  LABS:                        12.6   14.6  )-----------( 299      ( 25 Jul 2018 09:02 )             40.0     07-25    139  |  104  |  27<H>  ----------------------------<  152<H>  4.4   |  23  |  1.35<H>    Ca    9.1      25 Jul 2018 08:18  Phos  3.1     07-25  Mg     2.3     07-25    TPro  8.1  /  Alb  3.9  /  TBili  0.7  /  DBili  x   /  AST  19  /  ALT  13  /  AlkPhos  56  07-23    PT/INR - ( 25 Jul 2018 09:29 )   PT: 11.8 sec;   INR: 1.06          PTT - ( 25 Jul 2018 09:29 )  PTT:24.0 sec      CAPILLARY BLOOD GLUCOSE    POCT Blood Glucose.: 152 mg/dL (25 Jul 2018 07:13)  POCT Blood Glucose.: 193 mg/dL (25 Jul 2018 06:08)  POCT Blood Glucose.: 149 mg/dL (25 Jul 2018 00:13)  POCT Blood Glucose.: 138 mg/dL (24 Jul 2018 17:27)  POCT Blood Glucose.: 169 mg/dL (24 Jul 2018 16:22)  POCT Blood Glucose.: 159 mg/dL (24 Jul 2018 13:00)  POCT Blood Glucose.: 168 mg/dL (24 Jul 2018 11:49)      Drug Levels: [] N/A    CSF Analysis: [] N/A      Allergies    No Known Allergies    Intolerances      MEDICATIONS:  Antibiotics:    Neuro:  ARIPiprazole 20 milliGRAM(s) Oral daily  FLUoxetine 60 milliGRAM(s) Oral daily  lamoTRIgine 25 milliGRAM(s) Oral daily  levETIRAcetam  IVPB 1000 milliGRAM(s) IV Intermittent every 12 hours  ondansetron Injectable 4 milliGRAM(s) IV Push every 6 hours PRN  zolpidem 5 milliGRAM(s) Oral at bedtime PRN  zolpidem 5 milliGRAM(s) Oral at bedtime PRN    Anticoagulation:    OTHER:  colchicine 0.6 milliGRAM(s) Oral daily  dexamethasone  Injectable 4 milliGRAM(s) IV Push every 6 hours  dextrose 40% Gel 15 Gram(s) Oral once PRN  dextrose 50% Injectable 12.5 Gram(s) IV Push once  dextrose 50% Injectable 25 Gram(s) IV Push once  dextrose 50% Injectable 25 Gram(s) IV Push once  docusate sodium 100 milliGRAM(s) Oral three times a day  glucagon  Injectable 1 milliGRAM(s) IntraMuscular once PRN  insulin lispro (HumaLOG) corrective regimen sliding scale   SubCutaneous Before meals and at bedtime  pantoprazole  Injectable 40 milliGRAM(s) IV Push daily  senna 2 Tablet(s) Oral at bedtime    IVF:  dextrose 5%. 1000 milliLiter(s) IV Continuous <Continuous>  sodium chloride 0.9%. 1000 milliLiter(s) IV Continuous <Continuous>      ASSESSMENT:  53y Male s/p< from: CT Head No Cont (07.23.18 @ 14:16) >  IMPRESSION:     Bilateral parietal postsurgical change, similar to recent prior MRI. No   acute intracranial hemorrhage or mass effect.    Asymmetric lucent appearance of the left hippocampal formation, possibly   sequela of seizure. Consider repeat MRI as clinically warranted.

## 2018-07-25 NOTE — PROGRESS NOTE ADULT - ASSESSMENT
54 yo M w/ GBM    PLAN:  Monitor neuro status  OT/PT  F/U Vascular consult for DVT, IVCF vs anticoagulation  ok w/ IVC filter  Continue current medical regime  Dispo; Discussed with attending

## 2018-07-25 NOTE — PROGRESS NOTE BEHAVIORAL HEALTH - NSBHCHARTREVIEWIMAGING_PSY_A_CORE FT
< from: CT Head No Cont (07.23.18 @ 14:16) >      IMPRESSION:     Bilateral parietal postsurgical change, similar to recent prior MRI. No   acute intracranial hemorrhage or mass effect.    Asymmetric lucent appearance of the left hippocampal formation, possibly   sequela of seizure. Consider repeat MRI as clinically warranted.    < end of copied text >

## 2018-07-25 NOTE — PROGRESS NOTE BEHAVIORAL HEALTH - PROBLEM SELECTOR PLAN 1
-continue prozac 60mg po daily for treatment of depression and anxiety  -continue abilify to 20mg po daily for mood stabilization  -stop ambien, it is not recommended to long term treatment of insomnia (maximum of one month)  -continue trazodone 50mg po qhs for treatment of insomnia  -d/w patient different behavior techniques to help cope with his symptoms

## 2018-07-25 NOTE — PROGRESS NOTE BEHAVIORAL HEALTH - NSBHFUPINTERVALHXFT_PSY_A_CORE
Patient seen and interviewed at bedside. He presented asleep, hard to wake up. He initially did not recognize the writer.   Patient reports that his mood has improved since yesterday.  He reports frustration with his current medical state and impact on his functioning. He reports that his sleep has been stable. He denies any SI/HI. Off note, patient presented less engaged than yesterday but still pleasant and cooperative.

## 2018-07-25 NOTE — PROGRESS NOTE BEHAVIORAL HEALTH - SUMMARY
54 y/o man with PPH of anxiety and bipolar disorder and PMH of high grade glioma, GBM , s/p craniotomy, s/p chemo and Rtx, hx of seizures and gout, presenting with worsening generalized weakness and difficulty ambulating for the last two month. Psychiatry was consulted for an evaluation for depression. Patient reports that during the last month he has been feeling increasingly depressed in context of worsening physical symptoms which cause significant impact on his life.  Patient presents today with a slight improvement in his mood

## 2018-07-26 DIAGNOSIS — C71.9 MALIGNANT NEOPLASM OF BRAIN, UNSPECIFIED: ICD-10-CM

## 2018-07-26 DIAGNOSIS — G40.909 EPILEPSY, UNSPECIFIED, NOT INTRACTABLE, WITHOUT STATUS EPILEPTICUS: ICD-10-CM

## 2018-07-26 DIAGNOSIS — I82.409 ACUTE EMBOLISM AND THROMBOSIS OF UNSPECIFIED DEEP VEINS OF UNSPECIFIED LOWER EXTREMITY: ICD-10-CM

## 2018-07-26 LAB
GLUCOSE BLDC GLUCOMTR-MCNC: 123 MG/DL — HIGH (ref 70–99)
GLUCOSE BLDC GLUCOMTR-MCNC: 165 MG/DL — HIGH (ref 70–99)
GLUCOSE BLDC GLUCOMTR-MCNC: 166 MG/DL — HIGH (ref 70–99)
GLUCOSE BLDC GLUCOMTR-MCNC: 254 MG/DL — HIGH (ref 70–99)

## 2018-07-26 PROCEDURE — 99222 1ST HOSP IP/OBS MODERATE 55: CPT | Mod: GC

## 2018-07-26 RX ORDER — APIXABAN 2.5 MG/1
5 TABLET, FILM COATED ORAL EVERY 12 HOURS
Qty: 0 | Refills: 0 | Status: CANCELLED | OUTPATIENT
Start: 2018-08-02 | End: 2018-07-31

## 2018-07-26 RX ORDER — LEVETIRACETAM 250 MG/1
1000 TABLET, FILM COATED ORAL
Qty: 0 | Refills: 0 | Status: DISCONTINUED | OUTPATIENT
Start: 2018-07-26 | End: 2018-07-27

## 2018-07-26 RX ORDER — PANTOPRAZOLE SODIUM 20 MG/1
40 TABLET, DELAYED RELEASE ORAL
Qty: 0 | Refills: 0 | Status: DISCONTINUED | OUTPATIENT
Start: 2018-07-26 | End: 2018-07-31

## 2018-07-26 RX ORDER — APIXABAN 2.5 MG/1
10 TABLET, FILM COATED ORAL EVERY 12 HOURS
Qty: 0 | Refills: 0 | Status: DISCONTINUED | OUTPATIENT
Start: 2018-07-26 | End: 2018-07-31

## 2018-07-26 RX ADMIN — Medication 4 MILLIGRAM(S): at 18:47

## 2018-07-26 RX ADMIN — Medication 100 MILLIGRAM(S): at 21:35

## 2018-07-26 RX ADMIN — Medication 6: at 12:17

## 2018-07-26 RX ADMIN — LAMOTRIGINE 25 MILLIGRAM(S): 25 TABLET, ORALLY DISINTEGRATING ORAL at 21:35

## 2018-07-26 RX ADMIN — LEVETIRACETAM 1000 MILLIGRAM(S): 250 TABLET, FILM COATED ORAL at 18:50

## 2018-07-26 RX ADMIN — Medication 4 MILLIGRAM(S): at 06:03

## 2018-07-26 RX ADMIN — LEVETIRACETAM 400 MILLIGRAM(S): 250 TABLET, FILM COATED ORAL at 06:03

## 2018-07-26 RX ADMIN — PANTOPRAZOLE SODIUM 40 MILLIGRAM(S): 20 TABLET, DELAYED RELEASE ORAL at 09:20

## 2018-07-26 RX ADMIN — APIXABAN 10 MILLIGRAM(S): 2.5 TABLET, FILM COATED ORAL at 18:47

## 2018-07-26 RX ADMIN — SENNA PLUS 2 TABLET(S): 8.6 TABLET ORAL at 21:35

## 2018-07-26 RX ADMIN — Medication 0.6 MILLIGRAM(S): at 12:48

## 2018-07-26 RX ADMIN — ARIPIPRAZOLE 20 MILLIGRAM(S): 15 TABLET ORAL at 12:49

## 2018-07-26 RX ADMIN — Medication 2: at 21:35

## 2018-07-26 RX ADMIN — Medication 4 MILLIGRAM(S): at 12:49

## 2018-07-26 RX ADMIN — Medication 60 MILLIGRAM(S): at 12:48

## 2018-07-26 NOTE — PROGRESS NOTE ADULT - SUBJECTIVE AND OBJECTIVE BOX
54y/o M with PMHx sig for anxiety, depression, gout, seizure GBM h/o craniotomy (3/2018), last chemotherapy session last week who was admitted for workup for worsening generalized weakness and difficulty ambulating found to have acute DVT of the right common femoral vein, proximal deep femoral vein, femoral vein, and popliteal vein.  Patient cleared by Dr. Mason for anticoagulation. No need for IVC filter placement.    Signing off, reconsult as needed.

## 2018-07-26 NOTE — PROGRESS NOTE ADULT - SUBJECTIVE AND OBJECTIVE BOX
HPI:  no acute events overnight  MRI reviewed with Dr. Mason  was planned for IVC-F today; deferred because patient is eligible for AC    OVERNIGHT EVENTS:  Vital Signs Last 24 Hrs  T(C): 36.9 (26 Jul 2018 05:22), Max: 36.9 (26 Jul 2018 05:22)  T(F): 98.5 (26 Jul 2018 05:22), Max: 98.5 (26 Jul 2018 05:22)  HR: 52 (26 Jul 2018 06:10) (52 - 72)  BP: 122/75 (26 Jul 2018 06:10) (122/75 - 128/79)  BP(mean): 92 (26 Jul 2018 06:10) (88 - 101)  RR: 18 (26 Jul 2018 06:10) (17 - 18)  SpO2: 95% (26 Jul 2018 06:10) (95% - 98%)    I&O's Summary    25 Jul 2018 07:01  -  26 Jul 2018 07:00  --------------------------------------------------------  IN: 1800 mL / OUT: 750 mL / NET: 1050 mL        PHYSICAL EXAM:  Neurological:  AAOx3, FC speech coherent  CNII-XII: EOM intact, PERRL, face symmetric, tongue midline  Motor: MAEx4 RUE/LUE 5/5, RLE/LLE 4+/5, R protonator drift  Scalp incision site C/D/I, healing well    TUBES/LINES:  pivs    DIET:  [] NPO  [x] Mechanical  [] Tube feeds    LABS:                        12.6   14.6  )-----------( 299      ( 25 Jul 2018 09:02 )             40.0     07-25    139  |  104  |  27<H>  ----------------------------<  152<H>  4.4   |  23  |  1.35<H>    Ca    9.1      25 Jul 2018 08:18  Phos  3.1     07-25  Mg     2.3     07-25      PT/INR - ( 25 Jul 2018 09:29 )   PT: 11.8 sec;   INR: 1.06          PTT - ( 25 Jul 2018 09:29 )  PTT:24.0 sec        CAPILLARY BLOOD GLUCOSE      POCT Blood Glucose.: 166 mg/dL (26 Jul 2018 06:33)  POCT Blood Glucose.: 140 mg/dL (25 Jul 2018 21:18)  POCT Blood Glucose.: 135 mg/dL (25 Jul 2018 16:13)  POCT Blood Glucose.: 116 mg/dL (25 Jul 2018 11:44)      Drug Levels: [] N/A    CSF Analysis: [] N/A      Allergies    No Known Allergies    Intolerances      MEDICATIONS:  Antibiotics:    Neuro:  ARIPiprazole 20 milliGRAM(s) Oral daily  FLUoxetine 60 milliGRAM(s) Oral daily  lamoTRIgine 25 milliGRAM(s) Oral daily  levETIRAcetam  IVPB 1000 milliGRAM(s) IV Intermittent every 12 hours  ondansetron Injectable 4 milliGRAM(s) IV Push every 6 hours PRN  zolpidem 5 milliGRAM(s) Oral at bedtime PRN  zolpidem 5 milliGRAM(s) Oral at bedtime PRN    Anticoagulation:    OTHER:  colchicine 0.6 milliGRAM(s) Oral daily  dexamethasone  Injectable 4 milliGRAM(s) IV Push every 6 hours  dextrose 40% Gel 15 Gram(s) Oral once PRN  dextrose 50% Injectable 12.5 Gram(s) IV Push once  dextrose 50% Injectable 25 Gram(s) IV Push once  dextrose 50% Injectable 25 Gram(s) IV Push once  docusate sodium 100 milliGRAM(s) Oral three times a day  glucagon  Injectable 1 milliGRAM(s) IntraMuscular once PRN  insulin lispro (HumaLOG) corrective regimen sliding scale   SubCutaneous Before meals and at bedtime  pantoprazole  Injectable 40 milliGRAM(s) IV Push daily  senna 2 Tablet(s) Oral at bedtime    IVF:  dextrose 5%. 1000 milliLiter(s) IV Continuous <Continuous>  sodium chloride 0.9%. 1000 milliLiter(s) IV Continuous <Continuous>    CULTURES:    RADIOLOGY & ADDITIONAL TESTS:  < from: MR Head w/wo IV Cont (07.24.18 @ 23:21) >    IMPRESSION: Patient status post left parietal craniotomy with   postsurgical changes. New FLAIR/T2 signal abnormality within the left   thalamus standing to the periatrial whitematter without enhancement.   Stable FLAIR/T2 signal abnormality within the left hippocampal region and   left parietal lobe.    < end of copied text >      ASSESSMENT:  53y Male admitted for observation and new MRI diagnosed with LE DVT neuro stable    PLAN:  NEURO:  start eliquis  no filter  appreciate vascular  diet as tolerated  cont keppra and home meds  pt/ot  dc planning  d/w Dr. Mason

## 2018-07-26 NOTE — CONSULT NOTE ADULT - ATTENDING COMMENTS
Above noted.    Patient has DVT.    If anticoagulation is contraindicated will place IVC filter.
Patient seen and examined with house-staff during bedside rounds.  Resident note read, including vitals, physical findings, laboratory data, and radiological reports.   Revisions included below.  Direct personal management at bed side and extensive interpretation of the data.  Plan was outlined and discussed in details with the housestaff.  Decision making of high complexity  Action taken for acute disease activity to reflect the level of care provided:  - medication reconciliation  - review laboratory data

## 2018-07-26 NOTE — CONSULT NOTE ADULT - SUBJECTIVE AND OBJECTIVE BOX
Patient is a 53y old  Male who presents with a chief complaint of progressive worsening weakness of lower extremities, difficulty ambulating (23 Jul 2018 15:10)      HPI:  54y/o M with PMHx sig for anxiety, depression, h/o craniotomy (3/2018 high grade glioma, GBM), h/o seizure presents to ED from home with worsening generalized weakness and difficulty ambulating over the past 2 months. Pt states he has a history of gout and believes he is having a flair-up right now as his elbows and knee joints are very tender to touch. Pt has had his last chemotherapy session last week. Pt is unaware if he was on steroids at home. Denies headaches, acute changes in vision, acute loss of sensation of extremities, facial symmetry, dysarthria, recent seizures, injury/trauma to head, recent seizures. (23 Jul 2018 15:10)      PAST MEDICAL & SURGICAL HISTORY:  Gout  Anxiety and depression  Seizure disorder  GBM (glioblastoma multiforme)  History of craniotomy: 6/2017      FAMILY HISTORY:  No pertinent family history in first degree relatives      SOCIAL HISTORY:  Smoking Status: [ ] Current, [ ] Former, [ x] Never  Pack Years:    MEDICATIONS:  Pulmonary:    Antimicrobials:    Anticoagulants:  apixaban 10 milliGRAM(s) Oral every 12 hours    Onc:    GI/:  docusate sodium 100 milliGRAM(s) Oral three times a day  pantoprazole    Tablet 40 milliGRAM(s) Oral before breakfast  senna 2 Tablet(s) Oral at bedtime    Endocrine:  colchicine 0.6 milliGRAM(s) Oral daily  dexamethasone  Injectable 4 milliGRAM(s) IV Push every 6 hours  dextrose 40% Gel 15 Gram(s) Oral once PRN  dextrose 50% Injectable 12.5 Gram(s) IV Push once  dextrose 50% Injectable 25 Gram(s) IV Push once  dextrose 50% Injectable 25 Gram(s) IV Push once  glucagon  Injectable 1 milliGRAM(s) IntraMuscular once PRN  insulin lispro (HumaLOG) corrective regimen sliding scale   SubCutaneous Before meals and at bedtime    Cardiac:    Other Medications:  ARIPiprazole 20 milliGRAM(s) Oral daily  dextrose 5%. 1000 milliLiter(s) IV Continuous <Continuous>  FLUoxetine 60 milliGRAM(s) Oral daily  lamoTRIgine 25 milliGRAM(s) Oral daily  levETIRAcetam 1000 milliGRAM(s) Oral two times a day  ondansetron Injectable 4 milliGRAM(s) IV Push every 6 hours PRN  zolpidem 5 milliGRAM(s) Oral at bedtime PRN  zolpidem 5 milliGRAM(s) Oral at bedtime PRN      Allergies    No Known Allergies    Intolerances        Vital Signs Last 24 Hrs  T(C): 36.3 (26 Jul 2018 14:15), Max: 36.9 (26 Jul 2018 05:22)  T(F): 97.3 (26 Jul 2018 14:15), Max: 98.5 (26 Jul 2018 05:22)  HR: 70 (26 Jul 2018 17:29) (50 - 72)  BP: 129/81 (26 Jul 2018 17:29) (120/76 - 129/81)  BP(mean): 100 (26 Jul 2018 17:29) (88 - 100)  RR: 18 (26 Jul 2018 17:29) (17 - 18)  SpO2: 96% (26 Jul 2018 17:29) (95% - 98%)    07-25 @ 07:01 - 07-26 @ 07:00  --------------------------------------------------------  IN: 1800 mL / OUT: 750 mL / NET: 1050 mL    07-26 @ 07:01 - 07-26 @ 19:23  --------------------------------------------------------  IN: 300 mL / OUT: 900 mL / NET: -600 mL          LABS:      CBC Full  -  ( 25 Jul 2018 09:02 )  WBC Count : 14.6 K/uL  Hemoglobin : 12.6 g/dL  Hematocrit : 40.0 %  Platelet Count - Automated : 299 K/uL  Mean Cell Volume : 89.1 fL  Mean Cell Hemoglobin : 28.1 pg  Mean Cell Hemoglobin Concentration : 31.5 g/dL  Auto Neutrophil # : x  Auto Lymphocyte # : x  Auto Monocyte # : x  Auto Eosinophil # : x  Auto Basophil # : x  Auto Neutrophil % : x  Auto Lymphocyte % : x  Auto Monocyte % : x  Auto Eosinophil % : x  Auto Basophil % : x    07-25    139  |  104  |  27<H>  ----------------------------<  152<H>  4.4   |  23  |  1.35<H>    Ca    9.1      25 Jul 2018 08:18  Phos  3.1     07-25  Mg     2.3     07-25      PT/INR - ( 25 Jul 2018 09:29 )   PT: 11.8 sec;   INR: 1.06          PTT - ( 25 Jul 2018 09:29 )  PTT:24.0 sec  < from: Xray Chest 1 View- PORTABLE-Urgent (07.23.18 @ 13:29) >  EXAM:  XR CHEST PORTABLE URGENT 1V                          PROCEDURE DATE:  07/23/2018          INTERPRETATION:  XR CHEST PORTABLE URGENT 1V dated 7/23/2018 1:29 PM    INDICATION: 53 years-old Male, status post craniotomy for GBM.     PRIOR STUDIES: None    FINDINGS: The lungs are clear. No pleural effusions or pneumothorax. The   cardiac size silhouette is normal.     IMPRESSION:  Clear lungs.      < end of copied text >                < from: US Duplex Venous Lower Ext Complete, Bilateral (07.24.18 @ 18:19) >  EXAM:  US DPLX LWR EXT VEINS COMPL BI                          PROCEDURE DATE:  07/24/2018          INTERPRETATION:  VENOUS DUPLEX DOPPLER OF BOTH LOWER EXTREMITIES dated   7/24/2018 6:19 PM    INDICATION: Brain tumor. Weakness.    TECHNIQUE: Duplex Doppler evaluation including gray-scale ultrasound   imaging, color flow Doppler imaging, and Doppler spectral analysis of the   veins of both lower extremities was performed.     COMPARISON: Lower extremity duplex 3/17/2018.    FINDINGS:    Thigh veins: There is acute thrombus within the right common femoral   vein, proximal deep femoral vein, femoral vein, and popliteal vein. The   left common femoral, femoral, popliteal, proximal greater saphenous, and   proximal deep femoral veins are patentand free of thrombus. The veins   are normally compressible and have normal phasic flow and augmentation   response.    Calf veins: The paired peroneal and posterior tibial calf veins are   patent bilaterally.      IMPRESSION:  Extensive deep vein thrombosis throughout the veins of the right thigh as   above.    < end of copied text >      RADIOLOGY & ADDITIONAL STUDIES (The following images were personally reviewed):

## 2018-07-26 NOTE — CONSULT NOTE ADULT - PROBLEM SELECTOR RECOMMENDATION 9
The patient has on the line cancer. He was not moving around at home. Patient has extensive DVT of the lower extremities do to his hypercoagulable state. Patient was started on anticoagulation. CT scan of the chest the role out PE

## 2018-07-27 DIAGNOSIS — I26.99 OTHER PULMONARY EMBOLISM WITHOUT ACUTE COR PULMONALE: ICD-10-CM

## 2018-07-27 LAB
ANION GAP SERPL CALC-SCNC: 10 MMOL/L — SIGNIFICANT CHANGE UP (ref 5–17)
BUN SERPL-MCNC: 25 MG/DL — HIGH (ref 7–23)
CALCIUM SERPL-MCNC: 9 MG/DL — SIGNIFICANT CHANGE UP (ref 8.4–10.5)
CHLORIDE SERPL-SCNC: 107 MMOL/L — SIGNIFICANT CHANGE UP (ref 96–108)
CO2 SERPL-SCNC: 25 MMOL/L — SIGNIFICANT CHANGE UP (ref 22–31)
CREAT SERPL-MCNC: 1.15 MG/DL — SIGNIFICANT CHANGE UP (ref 0.5–1.3)
GLUCOSE BLDC GLUCOMTR-MCNC: 130 MG/DL — HIGH (ref 70–99)
GLUCOSE BLDC GLUCOMTR-MCNC: 136 MG/DL — HIGH (ref 70–99)
GLUCOSE BLDC GLUCOMTR-MCNC: 144 MG/DL — HIGH (ref 70–99)
GLUCOSE BLDC GLUCOMTR-MCNC: 144 MG/DL — HIGH (ref 70–99)
GLUCOSE SERPL-MCNC: 143 MG/DL — HIGH (ref 70–99)
HCT VFR BLD CALC: 40.1 % — SIGNIFICANT CHANGE UP (ref 39–50)
HGB BLD-MCNC: 12.5 G/DL — LOW (ref 13–17)
MCHC RBC-ENTMCNC: 28.2 PG — SIGNIFICANT CHANGE UP (ref 27–34)
MCHC RBC-ENTMCNC: 31.2 G/DL — LOW (ref 32–36)
MCV RBC AUTO: 90.3 FL — SIGNIFICANT CHANGE UP (ref 80–100)
PLATELET # BLD AUTO: 299 K/UL — SIGNIFICANT CHANGE UP (ref 150–400)
POTASSIUM SERPL-MCNC: 4.6 MMOL/L — SIGNIFICANT CHANGE UP (ref 3.5–5.3)
POTASSIUM SERPL-SCNC: 4.6 MMOL/L — SIGNIFICANT CHANGE UP (ref 3.5–5.3)
RBC # BLD: 4.44 M/UL — SIGNIFICANT CHANGE UP (ref 4.2–5.8)
RBC # FLD: 12.5 % — SIGNIFICANT CHANGE UP (ref 10.3–16.9)
SODIUM SERPL-SCNC: 142 MMOL/L — SIGNIFICANT CHANGE UP (ref 135–145)
WBC # BLD: 13 K/UL — HIGH (ref 3.8–10.5)
WBC # FLD AUTO: 13 K/UL — HIGH (ref 3.8–10.5)

## 2018-07-27 PROCEDURE — 71275 CT ANGIOGRAPHY CHEST: CPT | Mod: 26

## 2018-07-27 PROCEDURE — 99232 SBSQ HOSP IP/OBS MODERATE 35: CPT | Mod: GC

## 2018-07-27 RX ORDER — DEXAMETHASONE 0.5 MG/5ML
4 ELIXIR ORAL EVERY 6 HOURS
Qty: 0 | Refills: 0 | Status: DISCONTINUED | OUTPATIENT
Start: 2018-07-27 | End: 2018-07-31

## 2018-07-27 RX ORDER — LEVETIRACETAM 250 MG/1
1250 TABLET, FILM COATED ORAL
Qty: 0 | Refills: 0 | Status: DISCONTINUED | OUTPATIENT
Start: 2018-07-28 | End: 2018-07-29

## 2018-07-27 RX ORDER — LEVETIRACETAM 250 MG/1
1000 TABLET, FILM COATED ORAL ONCE
Qty: 0 | Refills: 0 | Status: COMPLETED | OUTPATIENT
Start: 2018-07-27 | End: 2018-07-27

## 2018-07-27 RX ADMIN — Medication 60 MILLIGRAM(S): at 11:23

## 2018-07-27 RX ADMIN — Medication 4 MILLIGRAM(S): at 06:15

## 2018-07-27 RX ADMIN — SENNA PLUS 2 TABLET(S): 8.6 TABLET ORAL at 21:45

## 2018-07-27 RX ADMIN — Medication 4 MILLIGRAM(S): at 00:46

## 2018-07-27 RX ADMIN — ARIPIPRAZOLE 20 MILLIGRAM(S): 15 TABLET ORAL at 11:20

## 2018-07-27 RX ADMIN — PANTOPRAZOLE SODIUM 40 MILLIGRAM(S): 20 TABLET, DELAYED RELEASE ORAL at 06:15

## 2018-07-27 RX ADMIN — APIXABAN 10 MILLIGRAM(S): 2.5 TABLET, FILM COATED ORAL at 06:14

## 2018-07-27 RX ADMIN — LEVETIRACETAM 1000 MILLIGRAM(S): 250 TABLET, FILM COATED ORAL at 17:26

## 2018-07-27 RX ADMIN — LEVETIRACETAM 1000 MILLIGRAM(S): 250 TABLET, FILM COATED ORAL at 23:22

## 2018-07-27 RX ADMIN — LEVETIRACETAM 1000 MILLIGRAM(S): 250 TABLET, FILM COATED ORAL at 06:15

## 2018-07-27 RX ADMIN — Medication 4 MILLIGRAM(S): at 11:19

## 2018-07-27 RX ADMIN — Medication 4 MILLIGRAM(S): at 23:22

## 2018-07-27 RX ADMIN — APIXABAN 10 MILLIGRAM(S): 2.5 TABLET, FILM COATED ORAL at 17:25

## 2018-07-27 RX ADMIN — Medication 4 MILLIGRAM(S): at 17:28

## 2018-07-27 RX ADMIN — LAMOTRIGINE 25 MILLIGRAM(S): 25 TABLET, ORALLY DISINTEGRATING ORAL at 21:45

## 2018-07-27 RX ADMIN — Medication 0.6 MILLIGRAM(S): at 11:18

## 2018-07-27 RX ADMIN — Medication 100 MILLIGRAM(S): at 21:45

## 2018-07-27 RX ADMIN — ZOLPIDEM TARTRATE 5 MILLIGRAM(S): 10 TABLET ORAL at 22:17

## 2018-07-27 NOTE — OCCUPATIONAL THERAPY INITIAL EVALUATION ADULT - MANUAL MUSCLE TESTING RESULTS, REHAB EVAL
Smile symmetrical, tongue protrusion in midline, cheeks puff and eyebrows elevation grossly intact; bilateral upper extremities 5/5 throughout/no strength deficits were identified

## 2018-07-27 NOTE — PHYSICAL THERAPY INITIAL EVALUATION ADULT - SENSORY TESTS
(R) hand dominant; (L) hand  5/5, (R) hand  5/5. CN Testing: B/L Frontalis intact; B/L buccinator intact; smile symmetrical; tongue protrusion at midline; B/L eyes open/close intact; Shoulder elevation: intact bilaterally; Vision H-Test: bilateral tracking and smooth pursuit intact; Convergence/Divergence: impaired. (R) hand dominant; (L) hand  5/5, (R) hand  5/5. CN Testing: B/L Frontalis intact; B/L buccinator intact; smile symmetrical; tongue protrusion at midline; B/L eyes open/close intact; Shoulder elevation: intact bilaterally; Vision H-Test: bilateral tracking and smooth pursuit mildly impaired; Convergence/Divergence: impaired.

## 2018-07-27 NOTE — PHYSICAL THERAPY INITIAL EVALUATION ADULT - LEVEL OF INDEPENDENCE: SUPINE/SIT, REHAB EVAL
supervision/able to propell B/L LEs off the bed surface, HOB 45 degrees supervision/able to propel B/L LEs off the bed surface, HOB 45 degrees

## 2018-07-27 NOTE — PHYSICAL THERAPY INITIAL EVALUATION ADULT - PERTINENT HX OF CURRENT PROBLEM, REHAB EVAL
52y/o M with PMHx sig for anxiety, depression, h/o craniotomy (3/2018 high grade glioma, GBM), h/o seizure presents to ED from home with worsening generalized weakness and difficulty ambulating over the past 2 . Please refer to H&P on [place] for additional information.

## 2018-07-27 NOTE — PHYSICAL THERAPY INITIAL EVALUATION ADULT - TRANSFER SAFETY CONCERNS NOTED: TOILET, REHAB EVAL
decreased safety awareness/losing balance/decreased sequencing ability/decreased weight-shifting ability

## 2018-07-27 NOTE — PHYSICAL THERAPY INITIAL EVALUATION ADULT - CRITERIA FOR SKILLED THERAPEUTIC INTERVENTIONS
impairments found/risk reduction/prevention/therapy frequency/anticipated discharge recommendation/functional limitations in following categories/predicted duration of therapy intervention/rehab potential

## 2018-07-27 NOTE — OCCUPATIONAL THERAPY INITIAL EVALUATION ADULT - DIAGNOSIS, OT EVAL
Decreased ability to perform functional mobility and activities of daily living 2/2 impaired balance and cognition

## 2018-07-27 NOTE — OCCUPATIONAL THERAPY INITIAL EVALUATION ADULT - PERTINENT HX OF CURRENT PROBLEM, REHAB EVAL
52y/o M with PMHx sig for anxiety, depression, h/o craniotomy (3/2018 high grade glioma, GBM), h/o seizure presents to ED from home with worsening generalized weakness and difficulty ambulating over the past 2 months. Pt states he has a history of gout and believes he is having a flair-up right now as his elbows and knee joints are very tender to touch. Pt has had his last chemotherapy session last week.

## 2018-07-27 NOTE — OCCUPATIONAL THERAPY INITIAL EVALUATION ADULT - VISUAL ASSESSMENT: TRACKING
left to right/up gaze/patient can track in all planes above with decreased smoothness of pursuit/down gaze/right to left

## 2018-07-27 NOTE — OCCUPATIONAL THERAPY INITIAL EVALUATION ADULT - PERSONAL SAFETY AND JUDGMENT, REHAB EVAL
impaired safety awareness; decreased executive functioning ability (impaired sequencing/problem-solving)/impaired

## 2018-07-27 NOTE — PHYSICAL THERAPY INITIAL EVALUATION ADULT - GAIT DEVIATIONS NOTED, PT EVAL
decreased velocity of limb motion/decreased trunk rotation and arm swing, wide base of support, unsteady feet placement/decreased weight-shifting ability/decreased lady/decreased step length

## 2018-07-27 NOTE — PHYSICAL THERAPY INITIAL EVALUATION ADULT - GROSSLY INTACT, SENSORY
light touch intact throughout except diminished in B/L feet (L>R) light touch intact throughout except diminished in B/L feet (L>R), tingling and tremors B/L LE (L>R)

## 2018-07-27 NOTE — PHYSICAL THERAPY INITIAL EVALUATION ADULT - IMPAIRED TRANSFERS: TOILET, REHAB EVAL
required set up assist for performing hygiene and min assist to sit>stand form low toilet seat, one step commands for efficient task completion/impaired balance/impaired coordination/decreased flexibility/decreased strength

## 2018-07-27 NOTE — PHYSICAL THERAPY INITIAL EVALUATION ADULT - COORDINATION ASSESSED, REHAB EVAL
impaired B/L UEs/finger to nose intact B/L UEs, however completed with decreased speed throughout/finger to nose

## 2018-07-27 NOTE — PHYSICAL THERAPY INITIAL EVALUATION ADULT - GENERAL OBSERVATIONS, REHAB EVAL
Chart reviewed. IE completed. GREGORY Vizcarra cleared pt for treatment. Pt received semi-supine, NAD,+tele,+(R)heplock. Pt c/o no pain, agreeable to PT.

## 2018-07-27 NOTE — PROGRESS NOTE ADULT - SUBJECTIVE AND OBJECTIVE BOX
HPI:  54y/o M with PMHx sig for anxiety, depression, h/o craniotomy (3/2018 high grade glioma, GBM), h/o seizure presents to ED from home with worsening generalized weakness and difficulty ambulating over the past 2 months. Pt states he has a history of gout and believes he is having a flair-up right now as his elbows and knee joints are very tender to touch. Pt has had his last chemotherapy session last week. Pt is unaware if he was on steroids at home. Denies headaches, acute changes in vision, acute loss of sensation of extremities, facial symmetry, dysarthria, recent seizures, injury/trauma to head, recent seizures. (23 Jul 2018 15:10)    OVERNIGHT EVENTS: No issues overnight. Afebrile, neuro stable.    7/24: Dopplers positive for Extensive deep vein thrombosis throughout the veins of the right thigh  7/26: no acute events overnight. MRI reviewed with Dr. Mason. was planned for IVC-F today; deferred because patient is eligible for AC  7/27: No issues overnight. Afebrile, neuro stable.    Vital Signs Last 24 Hrs  T(C): 36.7 (27 Jul 2018 05:28), Max: 36.7 (27 Jul 2018 05:28)  T(F): 98 (27 Jul 2018 05:28), Max: 98 (27 Jul 2018 05:28)  HR: 48 (27 Jul 2018 08:44) (45 - 70)  BP: 136/93 (27 Jul 2018 08:44) (118/77 - 136/93)  BP(mean): 109 (27 Jul 2018 08:44) (92 - 109)  RR: 18 (27 Jul 2018 06:11) (18 - 18)  SpO2: 95% (27 Jul 2018 08:44) (95% - 96%)    I&O's Detail    26 Jul 2018 07:01  -  27 Jul 2018 07:00  --------------------------------------------------------  IN:    IV PiggyBack: 300 mL  Total IN: 300 mL    OUT:    Voided: 1350 mL  Total OUT: 1350 mL    Total NET: -1050 mL        I&O's Summary    26 Jul 2018 07:01  -  27 Jul 2018 07:00  --------------------------------------------------------  IN: 300 mL / OUT: 1350 mL / NET: -1050 mL        PHYSICAL EXAM:  Gen: laying in hospital bed comfortably, NAD  Neurological: AA+Ox3, opens eyes spontaneously, following commands  CN II-XII grossly intact, PERRL, EOMI  Motor exam: MAEx4, LUE and RUE 4/5 strength, LLE and RLE 5/5 strength  Cardiovascular: regular rate and rhythm  Respiratory: clear to auscultation  Gastrointestinal: soft, nontender, nondistended  Incision/Wound: crani site healing well    TUBES/LINES:  [] CVC  [] A-line  [] Lumbar Drain  [] Ventriculostomy  [] Other    DIET:  [] NPO  [x] Mechanical  [] Tube feeds    LABS:                        12.5   13.0  )-----------( 299      ( 27 Jul 2018 06:15 )             40.1     07-27    142  |  107  |  25<H>  ----------------------------<  143<H>  4.6   |  25  |  1.15    Ca    9.0      27 Jul 2018 06:15      PT/INR - ( 25 Jul 2018 09:29 )   PT: 11.8 sec;   INR: 1.06          PTT - ( 25 Jul 2018 09:29 )  PTT:24.0 sec        CAPILLARY BLOOD GLUCOSE      POCT Blood Glucose.: 130 mg/dL (27 Jul 2018 06:33)  POCT Blood Glucose.: 165 mg/dL (26 Jul 2018 21:28)  POCT Blood Glucose.: 123 mg/dL (26 Jul 2018 16:11)  POCT Blood Glucose.: 254 mg/dL (26 Jul 2018 11:30)      Drug Levels: [] N/A    CSF Analysis: [] N/A      Allergies    No Known Allergies    Intolerances      MEDICATIONS:  Antibiotics:    Neuro:  ARIPiprazole 20 milliGRAM(s) Oral daily  FLUoxetine 60 milliGRAM(s) Oral daily  lamoTRIgine 25 milliGRAM(s) Oral daily  levETIRAcetam 1000 milliGRAM(s) Oral two times a day  ondansetron Injectable 4 milliGRAM(s) IV Push every 6 hours PRN  zolpidem 5 milliGRAM(s) Oral at bedtime PRN  zolpidem 5 milliGRAM(s) Oral at bedtime PRN    Anticoagulation:  apixaban 10 milliGRAM(s) Oral every 12 hours    OTHER:  colchicine 0.6 milliGRAM(s) Oral daily  dexamethasone  Injectable 4 milliGRAM(s) IV Push every 6 hours  dextrose 40% Gel 15 Gram(s) Oral once PRN  dextrose 50% Injectable 12.5 Gram(s) IV Push once  dextrose 50% Injectable 25 Gram(s) IV Push once  dextrose 50% Injectable 25 Gram(s) IV Push once  docusate sodium 100 milliGRAM(s) Oral three times a day  glucagon  Injectable 1 milliGRAM(s) IntraMuscular once PRN  insulin lispro (HumaLOG) corrective regimen sliding scale   SubCutaneous Before meals and at bedtime  pantoprazole    Tablet 40 milliGRAM(s) Oral before breakfast  senna 2 Tablet(s) Oral at bedtime    IVF:  dextrose 5%. 1000 milliLiter(s) IV Continuous <Continuous>    CULTURES:    RADIOLOGY & ADDITIONAL TESTS:      ASSESSMENT:  53y Male with PMH seizures, GBM, h/o crani 3/2018 now with generalized weakness, malaise       PLAN:  NEURO:  - Neuro checks  - vitals checks  - pain control  - continue Keppra  - continue Decadron   - Protonix while on Decadron   - continue Lamictal   - regular diet  - bowel regimen: colace, senna  - Eliquis for DVT  - Pulm following, recs appreciated   - DVT ppx: SCD's on left side only   - PT/OT/OOB    DISPOSITION: stepdown status, placement pending PT/OT    d/w Dr. Mason, Dr. Lyon

## 2018-07-27 NOTE — PHYSICAL THERAPY INITIAL EVALUATION ADULT - ORIENTATION, REHAB EVAL
oriented to person, place, time and situation required redirection to date,(stated beginning July), able to repeat upon request at the end of the session/oriented to person, place, time and situation

## 2018-07-27 NOTE — PHYSICAL THERAPY INITIAL EVALUATION ADULT - IMPAIRMENTS CONTRIBUTING TO GAIT DEVIATIONS, PT EVAL
decreased strength/decreased attention to task (not able to remember instructions for amb: turn by the line, find room), c/o tightness in the hamstring with ambulation, decreased topographical orientation and safety awareness of hallway obstacles'/decreased flexibility/impaired coordination/impaired balance/cognition

## 2018-07-27 NOTE — PHYSICAL THERAPY INITIAL EVALUATION ADULT - SHORT TERM MEMORY, REHAB EVAL
impaired/able to remember 2/3 words (house, yunior) not in given order, recovered 3 word (apple) upon VCs.

## 2018-07-27 NOTE — PHYSICAL THERAPY INITIAL EVALUATION ADULT - IMPAIRMENTS CONTRIBUTING IMPAIRED BED MOBILITY, REHAB EVAL
impaired motion sequencing, decreased safety awareness (come too close to the edge in sitting EOB), required one step commands for redirection, decreased reaction to follow commands/decreased flexibility

## 2018-07-27 NOTE — PHYSICAL THERAPY INITIAL EVALUATION ADULT - PLANNED THERAPY INTERVENTIONS, PT EVAL
neuromuscular re-education/strengthening/bed mobility training/gait training/transfer training/balance training

## 2018-07-27 NOTE — DIETITIAN INITIAL EVALUATION ADULT. - OTHER INFO
52y/o M with LE DVT; neuro stable. He is seen asleep in chair. Breakfast tray noted at bedside with ~75% consumed. Denies N/V/D/C, pain, and mechanical issues. PTA he reports eating well. Encouraged adequate intake and to be mindful of [sugar] sources 2/2 steroid use. Will follow.

## 2018-07-27 NOTE — OCCUPATIONAL THERAPY INITIAL EVALUATION ADULT - PLANNED THERAPY INTERVENTIONS, OT EVAL
Hirsch, Lerman
Dr. Lerman
cognitive, visual perceptual/fine motor coordination training/transfer training/ADL retraining/IADL retraining/balance training/bed mobility training

## 2018-07-27 NOTE — OCCUPATIONAL THERAPY INITIAL EVALUATION ADULT - GENERAL OBSERVATIONS, REHAB EVAL
Patient cleared for Occupational Therapy by GREGORY Ricardo, patient received supine in ramsay position in non-acute distress. +Tele, +IV heplock, +bed alarm. Patient reports mild right lower extremity pain/tightness however not rated by patient. No redness, warmth.

## 2018-07-27 NOTE — PHYSICAL THERAPY INITIAL EVALUATION ADULT - IMPAIRED TRANSFERS: SIT/STAND, REHAB EVAL
demo decreased attention to task, impulsive to stand up without PT clearance/decreased strength/impaired balance/decreased flexibility

## 2018-07-27 NOTE — PHYSICAL THERAPY INITIAL EVALUATION ADULT - THERAPY FREQUENCY, PT EVAL
Patient educated on frequency of inpatient therapy at [facility], patient verbalized understanding./2-3x/week

## 2018-07-27 NOTE — PROGRESS NOTE ADULT - SUBJECTIVE AND OBJECTIVE BOX
Interval Events: Reviewed  Patient seen and examined at bedside.    Patient is a 53y old  Male who presents with a chief complaint of progressive worsening weakness of lower extremities, difficulty ambulating (23 Jul 2018 15:10)  Is doing okay. He is not short of breath. Is not coughing. No bleeding    PAST MEDICAL & SURGICAL HISTORY:  Gout  Anxiety and depression  Seizure disorder  GBM (glioblastoma multiforme)  History of craniotomy: 6/2017      MEDICATIONS:  Pulmonary:    Antimicrobials:    Anticoagulants:  apixaban 10 milliGRAM(s) Oral every 12 hours    Cardiac:      Allergies    No Known Allergies    Intolerances        Vital Signs Last 24 Hrs  T(C): 36.4 (27 Jul 2018 17:55), Max: 36.7 (27 Jul 2018 05:28)  T(F): 97.6 (27 Jul 2018 17:55), Max: 98 (27 Jul 2018 05:28)  HR: 58 (27 Jul 2018 20:20) (44 - 60)  BP: 126/67 (27 Jul 2018 20:20) (116/76 - 147/84)  BP(mean): 91 (27 Jul 2018 20:20) (91 - 110)  RR: 18 (27 Jul 2018 20:20) (18 - 18)  SpO2: 97% (27 Jul 2018 20:20) (95% - 100%)    07-26 @ 07:01  -  07-27 @ 07:00  --------------------------------------------------------  IN: 300 mL / OUT: 1350 mL / NET: -1050 mL          LABS:      CBC Full  -  ( 27 Jul 2018 06:15 )  WBC Count : 13.0 K/uL  Hemoglobin : 12.5 g/dL  Hematocrit : 40.1 %  Platelet Count - Automated : 299 K/uL  Mean Cell Volume : 90.3 fL  Mean Cell Hemoglobin : 28.2 pg  Mean Cell Hemoglobin Concentration : 31.2 g/dL  Auto Neutrophil # : x  Auto Lymphocyte # : x  Auto Monocyte # : x  Auto Eosinophil # : x  Auto Basophil # : x  Auto Neutrophil % : x  Auto Lymphocyte % : x  Auto Monocyte % : x  Auto Eosinophil % : x  Auto Basophil % : x    07-27    142  |  107  |  25<H>  ----------------------------<  143<H>  4.6   |  25  |  1.15    Ca    9.0      27 Jul 2018 06:15  < from: CT Angio Chest PE Protocol w/ IV Cont (07.27.18 @ 15:22) >  EXAM:  CT ANGIO CHEST PE PROTOCOL IC                          PROCEDURE DATE:  07/27/2018          INTERPRETATION:  CTA (CT angiography) of the CHEST     INDICATION: 53-year-old male with Weakness status post recent brain   surgery for GBM. Assess for pulmonary embolism.    TECHNIQUE: CT angiography of the chest was performed during bolus   injection of intravenous contrast.  Post-processing including the   production of axial, coronal and sagittal multiplanar reformatted images   and axial andcoronal maximum intensity projections (MIPs) was performed.   Timing of contrast bolus was tailored to evaluate the pulmonary arteries.   Contrast bolus is excellent. Contrast: 112 cc of Optiray 350 was   administered intravenously. 13 cc was discarded.    PRIOR STUDY: None.    FINDINGS:   There are filling defects seen in the left lower lobar pulmonary artery   extending into the segmental branches of the lingula and lower lobe.   There are also small filling defects in the right lower lobe posterior   basilar subsegmental arterial branches. No central embolus is seen. There   is no evidence of right heart strain.    The thoracic aorta is normal in appearance without aneurysm or   dissection. The heart is normal in size. There is no evidenceof right   heart strain. No pericardial effusion is seen.  No mediastinal, hilar or   axillary lymphadenopathy is seen.    No focal airspace consolidation, pleural effusions or pneumothorax is   seen. No pulmonary abnormalities are evident.      Limited evaluation of the upper abdomen demonstrates no abnormality.     Evaluation of the osseous structures demonstrates degenerative changes of   the spine.      IMPRESSION:   Bilateral pulmonary emboli in the left lower lobar artery continuing to   multiple segmental arteries and right lower lobe subsegmental arteries.   No evidence of right heart strain.    No focal airspace consolidation, pleural effusion or pneumothorax.    < end of copied text >                          RADIOLOGY & ADDITIONAL STUDIES (The following images were personally reviewed):  Lema:                                     No  Urine output:                       adequate  DVT prophylaxis:                 Yes  Flattus:                                  Yes  Bowel movement:              No

## 2018-07-27 NOTE — PHYSICAL THERAPY INITIAL EVALUATION ADULT - ADDITIONAL COMMENTS
Pt was independent in ambulation and relied on [facility] (available 8hours /4 days) with all ADLs/ADLs prior to admission. Pt lives alone in an elevator building with couple steps to enter however ramp is available as well. Pt states that [facility]HA takes care of his groceries and helps with cooking, as well as assist in self-care however when home alone, he orders out for food and stays in the apartment, does not take subways any longer, takes cab if needs to travel. Pt owns RW however denies using it prior to admission.

## 2018-07-28 LAB
ANION GAP SERPL CALC-SCNC: 13 MMOL/L — SIGNIFICANT CHANGE UP (ref 5–17)
BUN SERPL-MCNC: 27 MG/DL — HIGH (ref 7–23)
CALCIUM SERPL-MCNC: 8.9 MG/DL — SIGNIFICANT CHANGE UP (ref 8.4–10.5)
CHLORIDE SERPL-SCNC: 103 MMOL/L — SIGNIFICANT CHANGE UP (ref 96–108)
CO2 SERPL-SCNC: 24 MMOL/L — SIGNIFICANT CHANGE UP (ref 22–31)
CREAT SERPL-MCNC: 1.09 MG/DL — SIGNIFICANT CHANGE UP (ref 0.5–1.3)
GLUCOSE BLDC GLUCOMTR-MCNC: 127 MG/DL — HIGH (ref 70–99)
GLUCOSE BLDC GLUCOMTR-MCNC: 161 MG/DL — HIGH (ref 70–99)
GLUCOSE BLDC GLUCOMTR-MCNC: 171 MG/DL — HIGH (ref 70–99)
GLUCOSE BLDC GLUCOMTR-MCNC: 199 MG/DL — HIGH (ref 70–99)
GLUCOSE SERPL-MCNC: 207 MG/DL — HIGH (ref 70–99)
HCT VFR BLD CALC: 39.7 % — SIGNIFICANT CHANGE UP (ref 39–50)
HGB BLD-MCNC: 12.9 G/DL — LOW (ref 13–17)
MCHC RBC-ENTMCNC: 28.5 PG — SIGNIFICANT CHANGE UP (ref 27–34)
MCHC RBC-ENTMCNC: 32.5 G/DL — SIGNIFICANT CHANGE UP (ref 32–36)
MCV RBC AUTO: 87.8 FL — SIGNIFICANT CHANGE UP (ref 80–100)
PLATELET # BLD AUTO: 315 K/UL — SIGNIFICANT CHANGE UP (ref 150–400)
POTASSIUM SERPL-MCNC: 4.1 MMOL/L — SIGNIFICANT CHANGE UP (ref 3.5–5.3)
POTASSIUM SERPL-SCNC: 4.1 MMOL/L — SIGNIFICANT CHANGE UP (ref 3.5–5.3)
RBC # BLD: 4.52 M/UL — SIGNIFICANT CHANGE UP (ref 4.2–5.8)
RBC # FLD: 12.5 % — SIGNIFICANT CHANGE UP (ref 10.3–16.9)
SODIUM SERPL-SCNC: 140 MMOL/L — SIGNIFICANT CHANGE UP (ref 135–145)
WBC # BLD: 14.2 K/UL — HIGH (ref 3.8–10.5)
WBC # FLD AUTO: 14.2 K/UL — HIGH (ref 3.8–10.5)

## 2018-07-28 RX ADMIN — Medication 60 MILLIGRAM(S): at 12:36

## 2018-07-28 RX ADMIN — Medication 4 MILLIGRAM(S): at 06:36

## 2018-07-28 RX ADMIN — Medication 4 MILLIGRAM(S): at 17:53

## 2018-07-28 RX ADMIN — Medication 4 MILLIGRAM(S): at 23:57

## 2018-07-28 RX ADMIN — Medication 100 MILLIGRAM(S): at 22:11

## 2018-07-28 RX ADMIN — Medication 100 MILLIGRAM(S): at 06:32

## 2018-07-28 RX ADMIN — Medication 2: at 12:35

## 2018-07-28 RX ADMIN — ARIPIPRAZOLE 20 MILLIGRAM(S): 15 TABLET ORAL at 12:35

## 2018-07-28 RX ADMIN — ZOLPIDEM TARTRATE 5 MILLIGRAM(S): 10 TABLET ORAL at 23:57

## 2018-07-28 RX ADMIN — APIXABAN 10 MILLIGRAM(S): 2.5 TABLET, FILM COATED ORAL at 17:53

## 2018-07-28 RX ADMIN — Medication 2: at 22:10

## 2018-07-28 RX ADMIN — LEVETIRACETAM 1250 MILLIGRAM(S): 250 TABLET, FILM COATED ORAL at 17:54

## 2018-07-28 RX ADMIN — LEVETIRACETAM 1250 MILLIGRAM(S): 250 TABLET, FILM COATED ORAL at 06:36

## 2018-07-28 RX ADMIN — PANTOPRAZOLE SODIUM 40 MILLIGRAM(S): 20 TABLET, DELAYED RELEASE ORAL at 06:32

## 2018-07-28 RX ADMIN — SENNA PLUS 2 TABLET(S): 8.6 TABLET ORAL at 22:11

## 2018-07-28 RX ADMIN — Medication 0.6 MILLIGRAM(S): at 12:35

## 2018-07-28 RX ADMIN — APIXABAN 10 MILLIGRAM(S): 2.5 TABLET, FILM COATED ORAL at 06:37

## 2018-07-28 RX ADMIN — LAMOTRIGINE 25 MILLIGRAM(S): 25 TABLET, ORALLY DISINTEGRATING ORAL at 22:12

## 2018-07-28 RX ADMIN — Medication 2: at 06:41

## 2018-07-28 RX ADMIN — Medication 4 MILLIGRAM(S): at 12:35

## 2018-07-28 NOTE — PROGRESS NOTE ADULT - SUBJECTIVE AND OBJECTIVE BOX
HPI:  54y/o M with PMHx sig for anxiety, depression, h/o craniotomy (3/2018 high grade glioma, GBM), h/o seizure presents to ED from home with worsening generalized weakness and difficulty ambulating over the past 2 months. Pt states he has a history of gout and believes he is having a flair-up right now as his elbows and knee joints are very tender to touch. Pt has had his last chemotherapy session last week. Pt is unaware if he was on steroids at home. Denies headaches, acute changes in vision, acute loss of sensation of extremities, facial symmetry, dysarthria, recent seizures, injury/trauma to head, recent seizures. (23 Jul 2018 15:10)    OVERNIGHT EVENTS: CT chest performed per Dr. Garrison for further hypercoagulable workup, showing B/L PEs. Vascular re-consulted, recommended to continue eliquis 5mg BID. Overnight, Pt noted to have RLE twitching. Epilepsy consulted, given loading dose keppra 1g, maintenance increased to 1250mg BID. Pt refused EEG monitoring. Neuro stable.    7/24: Dopplers positive for Extensive deep vein thrombosis throughout the veins of the right thigh  7/26: no acute events overnight. MRI reviewed with Dr. Mason. was planned for IVC-F today; deferred because patient is eligible for AC  7/27: No issues overnight. Afebrile, neuro stable.   7/28: CT chest performed per Dr. Garrison for further hypercoagulable workup, showing B/L PEs. Vascular re-consulted, recommended to continue eliquis 10mg BID. Overnight, Pt noted to have RLE twitching. Epilepsy consulted, given loading dose keppra 1g, maintenance increased to 1250mg BID. Pt refused EEG monitoring. Neuro stable.      Vital Signs Last 24 Hrs  T(C): 36.8 (27 Jul 2018 22:37), Max: 36.8 (27 Jul 2018 22:37)  T(F): 98.3 (27 Jul 2018 22:37), Max: 98.3 (27 Jul 2018 22:37)  HR: 58 (27 Jul 2018 20:20) (44 - 60)  BP: 135/74 (27 Jul 2018 23:26) (116/76 - 147/84)  BP(mean): 100 (27 Jul 2018 23:26) (91 - 110)  RR: 18 (27 Jul 2018 23:26) (18 - 18)  SpO2: 97% (27 Jul 2018 23:26) (95% - 100%)    I&O's Summary    26 Jul 2018 07:01  -  27 Jul 2018 07:00  --------------------------------------------------------  IN: 300 mL / OUT: 1350 mL / NET: -1050 mL    27 Jul 2018 07:01  -  28 Jul 2018 01:47  --------------------------------------------------------  IN: 0 mL / OUT: 200 mL / NET: -200 mL    PHYSICAL EXAM:  Neurological: AAOx3, FC, speech coherent  CNII-XII: EOM intact, PERRL, face symmetric tongue midline  Motor: UE and LE 4+/5 B/l  SILT throughout         [x] warm well perfused; capillary refill <3 seconds   Incision/Wound: scalp craniotomy incision site healing well    TUBES/LINES:  [] Lema  [] Lumbar Drain  [] Wound Drains  [] Others    DIET:  [] NPO  [x] Mechanical  [] Tube feeds    LABS:                        12.5   13.0  )-----------( 299      ( 27 Jul 2018 06:15 )             40.1     07-27    142  |  107  |  25<H>  ----------------------------<  143<H>  4.6   |  25  |  1.15    Ca    9.0      27 Jul 2018 06:15      CAPILLARY BLOOD GLUCOSE      POCT Blood Glucose.: 144 mg/dL (27 Jul 2018 21:51)  POCT Blood Glucose.: 136 mg/dL (27 Jul 2018 16:30)  POCT Blood Glucose.: 144 mg/dL (27 Jul 2018 11:31)  POCT Blood Glucose.: 130 mg/dL (27 Jul 2018 06:33)      Drug Levels: [] N/A    CSF Analysis: [] N/A      Allergies    No Known Allergies    Intolerances      MEDICATIONS:  Antibiotics:    Neuro:  ARIPiprazole 20 milliGRAM(s) Oral daily  FLUoxetine 60 milliGRAM(s) Oral daily  lamoTRIgine 25 milliGRAM(s) Oral daily  levETIRAcetam 1250 milliGRAM(s) Oral two times a day  ondansetron Injectable 4 milliGRAM(s) IV Push every 6 hours PRN  zolpidem 5 milliGRAM(s) Oral at bedtime PRN  zolpidem 5 milliGRAM(s) Oral at bedtime PRN    Anticoagulation:  apixaban 10 milliGRAM(s) Oral every 12 hours    OTHER:  colchicine 0.6 milliGRAM(s) Oral daily  dexamethasone     Tablet 4 milliGRAM(s) Oral every 6 hours  dextrose 40% Gel 15 Gram(s) Oral once PRN  dextrose 50% Injectable 12.5 Gram(s) IV Push once  dextrose 50% Injectable 25 Gram(s) IV Push once  dextrose 50% Injectable 25 Gram(s) IV Push once  docusate sodium 100 milliGRAM(s) Oral three times a day  glucagon  Injectable 1 milliGRAM(s) IntraMuscular once PRN  insulin lispro (HumaLOG) corrective regimen sliding scale   SubCutaneous Before meals and at bedtime  pantoprazole    Tablet 40 milliGRAM(s) Oral before breakfast  senna 2 Tablet(s) Oral at bedtime    IVF:  dextrose 5%. 1000 milliLiter(s) IV Continuous <Continuous>    CULTURES:    RADIOLOGY & ADDITIONAL TESTS:    ASSESSMENT:  53y Male s/p    WEAKNESS  No pertinent family history in first degree relatives  Handoff  MEWS Score  Gout  Anxiety and depression  Seizure disorder  GBM (glioblastoma multiforme)  Weakness  Pulmonary emboli  GBM (glioblastoma multiforme)  Seizure disorder  DVT (deep venous thrombosis)  Anxiety  Bipolar affective disorder, currently depressed, moderate  Bipolar affective disorder, currently depressed, moderate  History of craniotomy  MED EVAL  90+      PLAN:  -neuro checks  -pain control  -continue decadron 4mg q6h  -PPI while on steroids  -pulmonology recs appreciated: continue eliquis 10mg BID for DVTs and PEs  -epilepsy recs appreciated: continue keppra 1250 BID and lamictal  -psychiatry recs appreciated: continue abilify, prozac  -vascular recs appreciated  -bowel regimen  -Dispo pending: [facility]  -family meeting Mon  -D/w Dr. Mason

## 2018-07-29 LAB
ANION GAP SERPL CALC-SCNC: 10 MMOL/L — SIGNIFICANT CHANGE UP (ref 5–17)
BUN SERPL-MCNC: 22 MG/DL — SIGNIFICANT CHANGE UP (ref 7–23)
CALCIUM SERPL-MCNC: 9.2 MG/DL — SIGNIFICANT CHANGE UP (ref 8.4–10.5)
CHLORIDE SERPL-SCNC: 104 MMOL/L — SIGNIFICANT CHANGE UP (ref 96–108)
CO2 SERPL-SCNC: 28 MMOL/L — SIGNIFICANT CHANGE UP (ref 22–31)
CREAT SERPL-MCNC: 1.1 MG/DL — SIGNIFICANT CHANGE UP (ref 0.5–1.3)
GLUCOSE BLDC GLUCOMTR-MCNC: 129 MG/DL — HIGH (ref 70–99)
GLUCOSE BLDC GLUCOMTR-MCNC: 138 MG/DL — HIGH (ref 70–99)
GLUCOSE BLDC GLUCOMTR-MCNC: 146 MG/DL — HIGH (ref 70–99)
GLUCOSE BLDC GLUCOMTR-MCNC: 189 MG/DL — HIGH (ref 70–99)
GLUCOSE SERPL-MCNC: 144 MG/DL — HIGH (ref 70–99)
HCT VFR BLD CALC: 41.5 % — SIGNIFICANT CHANGE UP (ref 39–50)
HGB BLD-MCNC: 13.7 G/DL — SIGNIFICANT CHANGE UP (ref 13–17)
MCHC RBC-ENTMCNC: 28.8 PG — SIGNIFICANT CHANGE UP (ref 27–34)
MCHC RBC-ENTMCNC: 33 G/DL — SIGNIFICANT CHANGE UP (ref 32–36)
MCV RBC AUTO: 87.4 FL — SIGNIFICANT CHANGE UP (ref 80–100)
PLATELET # BLD AUTO: 327 K/UL — SIGNIFICANT CHANGE UP (ref 150–400)
POTASSIUM SERPL-MCNC: 4.3 MMOL/L — SIGNIFICANT CHANGE UP (ref 3.5–5.3)
POTASSIUM SERPL-SCNC: 4.3 MMOL/L — SIGNIFICANT CHANGE UP (ref 3.5–5.3)
RBC # BLD: 4.75 M/UL — SIGNIFICANT CHANGE UP (ref 4.2–5.8)
RBC # FLD: 12.6 % — SIGNIFICANT CHANGE UP (ref 10.3–16.9)
SODIUM SERPL-SCNC: 142 MMOL/L — SIGNIFICANT CHANGE UP (ref 135–145)
WBC # BLD: 16.2 K/UL — HIGH (ref 3.8–10.5)
WBC # FLD AUTO: 16.2 K/UL — HIGH (ref 3.8–10.5)

## 2018-07-29 PROCEDURE — 99232 SBSQ HOSP IP/OBS MODERATE 35: CPT | Mod: GC

## 2018-07-29 PROCEDURE — 99231 SBSQ HOSP IP/OBS SF/LOW 25: CPT

## 2018-07-29 RX ORDER — LAMOTRIGINE 25 MG/1
25 TABLET, ORALLY DISINTEGRATING ORAL
Qty: 0 | Refills: 0 | Status: DISCONTINUED | OUTPATIENT
Start: 2018-07-29 | End: 2018-07-31

## 2018-07-29 RX ORDER — LEVETIRACETAM 250 MG/1
1500 TABLET, FILM COATED ORAL
Qty: 0 | Refills: 0 | Status: DISCONTINUED | OUTPATIENT
Start: 2018-07-29 | End: 2018-07-31

## 2018-07-29 RX ADMIN — LEVETIRACETAM 1250 MILLIGRAM(S): 250 TABLET, FILM COATED ORAL at 06:51

## 2018-07-29 RX ADMIN — Medication 0.6 MILLIGRAM(S): at 09:42

## 2018-07-29 RX ADMIN — Medication 4 MILLIGRAM(S): at 11:20

## 2018-07-29 RX ADMIN — Medication 100 MILLIGRAM(S): at 06:50

## 2018-07-29 RX ADMIN — LAMOTRIGINE 25 MILLIGRAM(S): 25 TABLET, ORALLY DISINTEGRATING ORAL at 18:50

## 2018-07-29 RX ADMIN — Medication 4 MILLIGRAM(S): at 18:50

## 2018-07-29 RX ADMIN — LEVETIRACETAM 1500 MILLIGRAM(S): 250 TABLET, FILM COATED ORAL at 18:50

## 2018-07-29 RX ADMIN — Medication 60 MILLIGRAM(S): at 09:43

## 2018-07-29 RX ADMIN — APIXABAN 10 MILLIGRAM(S): 2.5 TABLET, FILM COATED ORAL at 06:45

## 2018-07-29 RX ADMIN — ARIPIPRAZOLE 20 MILLIGRAM(S): 15 TABLET ORAL at 09:42

## 2018-07-29 RX ADMIN — APIXABAN 10 MILLIGRAM(S): 2.5 TABLET, FILM COATED ORAL at 18:50

## 2018-07-29 RX ADMIN — ZOLPIDEM TARTRATE 5 MILLIGRAM(S): 10 TABLET ORAL at 22:48

## 2018-07-29 RX ADMIN — Medication 4 MILLIGRAM(S): at 06:45

## 2018-07-29 RX ADMIN — Medication 2: at 11:54

## 2018-07-29 RX ADMIN — PANTOPRAZOLE SODIUM 40 MILLIGRAM(S): 20 TABLET, DELAYED RELEASE ORAL at 06:45

## 2018-07-29 NOTE — PROGRESS NOTE ADULT - SUBJECTIVE AND OBJECTIVE BOX
Interval Events: Reviewed  Patient seen and examined at bedside.    Patient is a 53y old  Male who presented with a chief complaint of progressive worsening weakness of lower extremities, difficulty ambulating dx'd with gliom pt s/p chemo, developed b/l PEs and on eliquis. pt feels well, has no complaints.       PAST MEDICAL & SURGICAL HISTORY:  Gout  Anxiety and depression  Seizure disorder  GBM (glioblastoma multiforme)  History of craniotomy: 6/2017      MEDICATIONS:  Pulmonary:    Antimicrobials:    Anticoagulants:  apixaban 10 milliGRAM(s) Oral every 12 hours    Cardiac:      Allergies    No Known Allergies    Intolerances        Vital Signs Last 24 Hrs  T(C): 35.9 (29 Jul 2018 09:19), Max: 36.8 (29 Jul 2018 05:09)  T(F): 96.7 (29 Jul 2018 09:19), Max: 98.2 (29 Jul 2018 05:09)  HR: 38 (29 Jul 2018 08:56) (38 - 68)  BP: 132/70 (29 Jul 2018 08:56) (119/70 - 146/82)  BP(mean): 108 (29 Jul 2018 04:42) (90 - 108)  RR: 18 (29 Jul 2018 08:56) (17 - 18)  SpO2: 98% (29 Jul 2018 08:56) (96% - 99%)    07-28 @ 07:01 - 07-29 @ 07:00  --------------------------------------------------------  IN: 0 mL / OUT: 275 mL / NET: -275 mL    07-29 @ 07:01 - 07-29 @ 12:10  --------------------------------------------------------  IN: 0 mL / OUT: 300 mL / NET: -300 mL          LABS:      CBC Full  -  ( 29 Jul 2018 07:06 )  WBC Count : 16.2 K/uL  Hemoglobin : 13.7 g/dL  Hematocrit : 41.5 %  Platelet Count - Automated : 327 K/uL  Mean Cell Volume : 87.4 fL  Mean Cell Hemoglobin : 28.8 pg  Mean Cell Hemoglobin Concentration : 33.0 g/dL  Auto Neutrophil # : x  Auto Lymphocyte # : x  Auto Monocyte # : x  Auto Eosinophil # : x  Auto Basophil # : x  Auto Neutrophil % : x  Auto Lymphocyte % : x  Auto Monocyte % : x  Auto Eosinophil % : x  Auto Basophil % : x    07-29    142  |  104  |  22  ----------------------------<  144<H>  4.3   |  28  |  1.10    Ca    9.2      29 Jul 2018 07:06                          RADIOLOGY & ADDITIONAL STUDIES (The following images were personally reviewed):  Lema:                                     No  Urine output:                       adequate  DVT prophylaxis:                 Yes  Flattus:                                  Yes  Bowel movement:              No

## 2018-07-29 NOTE — PROGRESS NOTE ADULT - SUBJECTIVE AND OBJECTIVE BOX
Consult: RLE twitching/seizure activity    HPI: 54 y/o M with GBM s/p b/l craniotomy and tumor resection, admitted with generalized weakness and malaise. History and exam limited as patient uncooperative and disoriented. He endorses that he had his first GTC seizure 1 year ago. More recently he has been having intermittent RLE twitching almost daily. He has refused VEEG testing in house, therefore Keppra increased from 1gm BID to 1250mg BID without much improvement in twitching. Pt is also on LTG 25mg daily but unclear if for mood, seizure activity or both.     PMHx/PSHx:  Anxiety, depression, h/o craniotomy (3/2018 high grade glioma, GBM), h/o seizure   FamHX: patient uncooperative with questioning  SocHx: patient uncooperative with questioning  Allergies: NKDA    ROS- No pain, + RLE twitching, denies depressed or anxious mood.     VITAL SIGNS:  Vital Signs Last 24 Hrs  T(C): 36.2 (29 Jul 2018 14:27), Max: 36.8 (29 Jul 2018 05:09)  T(F): 97.2 (29 Jul 2018 14:27), Max: 98.2 (29 Jul 2018 05:09)  HR: 52 (29 Jul 2018 13:57) (38 - 58)  BP: 114/69 (29 Jul 2018 13:57) (114/69 - 146/82)  BP(mean): 87 (29 Jul 2018 13:57) (87 - 108)  RR: 18 (29 Jul 2018 13:57) (17 - 18)  SpO2: 96% (29 Jul 2018 13:57) (96% - 99%)    PHYSICAL EXAMINATION:  Psych: flat affect  Neuro:  Awake, answers questions with eyes closed, oriented to self, city, hospital, says it is June 30, 1983. Follows some simple commands  PERRL, orthophoric gaze, face symmetric  B/l hand  intact, refuses to raise extremities antigravity- + RLE twitching noted  LT symmetrically intact  Pt uncooperative with reflex and coordination testing    MEDS:  MEDICATIONS  (STANDING):  apixaban 10 milliGRAM(s) Oral every 12 hours  ARIPiprazole 20 milliGRAM(s) Oral daily  colchicine 0.6 milliGRAM(s) Oral daily  dexamethasone     Tablet 4 milliGRAM(s) Oral every 6 hours  dextrose 5%. 1000 milliLiter(s) (50 mL/Hr) IV Continuous <Continuous>  dextrose 50% Injectable 12.5 Gram(s) IV Push once  dextrose 50% Injectable 25 Gram(s) IV Push once  dextrose 50% Injectable 25 Gram(s) IV Push once  docusate sodium 100 milliGRAM(s) Oral three times a day  FLUoxetine 60 milliGRAM(s) Oral daily  insulin lispro (HumaLOG) corrective regimen sliding scale   SubCutaneous Before meals and at bedtime  lamoTRIgine 25 milliGRAM(s) Oral daily  levETIRAcetam 1250 milliGRAM(s) Oral two times a day  pantoprazole    Tablet 40 milliGRAM(s) Oral before breakfast  senna 2 Tablet(s) Oral at bedtime    MEDICATIONS  (PRN):  dextrose 40% Gel 15 Gram(s) Oral once PRN Blood Glucose LESS THAN 70 milliGRAM(s)/deciliter  glucagon  Injectable 1 milliGRAM(s) IntraMuscular once PRN Glucose LESS THAN 70 milligrams/deciliter  ondansetron Injectable 4 milliGRAM(s) IV Push every 6 hours PRN Nausea and/or Vomiting  zolpidem 5 milliGRAM(s) Oral at bedtime PRN Insomnia  zolpidem 5 milliGRAM(s) Oral at bedtime PRN Insomnia      LABS:                          13.7   16.2  )-----------( 327      ( 29 Jul 2018 07:06 )             41.5     07-29    142  |  104  |  22  ----------------------------<  144<H>  4.3   |  28  |  1.10    Ca    9.2      29 Jul 2018 07:06      IMPRESSION & PLAN:    54 y/o M with GBM s/p b/l craniotomy and tumor resection, admitted with generalized weakness and malaise. Noted by surgical staff to have intermittent RLE twitching during admission; however, pt refusing EEG testing. LEV increased from 1gm BID to 1250mg BID without much improvement in twitching which is likely due to focal motor seizure activity.    1) VEEG when patient agreeable  2) Consider Psychiatry consult to determine capacity for medical decision making  3) Increase Keppra to 1500mg BID and Lamitcal  to 25mg BID (Lamitcal should be slowly titrated as outpatient by his neurologist as it cannot be rapidly increased due to risk of SJS).  4) Seizure/fall precautions    Discussed above with NSx PA Consult: RLE twitching/seizure activity    HPI: 54 y/o M with GBM s/p b/l craniotomy and tumor resection, admitted with generalized weakness and malaise. History and exam limited as patient uncooperative and disoriented. He endorses that he had his first GTC seizure 1 year ago. More recently he has been having intermittent RLE twitching almost daily. He has refused VEEG testing in house, therefore Keppra increased from 1gm BID to 1250mg BID without much improvement in twitching. Pt is also on LTG 25mg daily but unclear if for mood, seizure activity or both.     PMHx/PSHx:  Anxiety, depression, h/o craniotomy (3/2018 high grade glioma, GBM), h/o seizure   FamHX: patient uncooperative with questioning  SocHx: patient uncooperative with questioning  Allergies: NKDA    ROS- No pain, + RLE twitching, denies depressed or anxious mood.     VITAL SIGNS:  Vital Signs Last 24 Hrs  T(C): 36.2 (29 Jul 2018 14:27), Max: 36.8 (29 Jul 2018 05:09)  T(F): 97.2 (29 Jul 2018 14:27), Max: 98.2 (29 Jul 2018 05:09)  HR: 52 (29 Jul 2018 13:57) (38 - 58)  BP: 114/69 (29 Jul 2018 13:57) (114/69 - 146/82)  BP(mean): 87 (29 Jul 2018 13:57) (87 - 108)  RR: 18 (29 Jul 2018 13:57) (17 - 18)  SpO2: 96% (29 Jul 2018 13:57) (96% - 99%)    PHYSICAL EXAMINATION:  Psych: flat affect  Neuro:  Awake, answers questions with eyes closed, oriented to self, city, hospital, says it is June 30, 1983. Follows some simple commands  PERRL, orthophoric gaze, face symmetric  B/l hand  intact, refuses to raise extremities antigravity- + RLE twitching noted  LT symmetrically intact  Pt uncooperative with reflex and coordination testing    MEDS:  MEDICATIONS  (STANDING):  apixaban 10 milliGRAM(s) Oral every 12 hours  ARIPiprazole 20 milliGRAM(s) Oral daily  colchicine 0.6 milliGRAM(s) Oral daily  dexamethasone     Tablet 4 milliGRAM(s) Oral every 6 hours  dextrose 5%. 1000 milliLiter(s) (50 mL/Hr) IV Continuous <Continuous>  dextrose 50% Injectable 12.5 Gram(s) IV Push once  dextrose 50% Injectable 25 Gram(s) IV Push once  dextrose 50% Injectable 25 Gram(s) IV Push once  docusate sodium 100 milliGRAM(s) Oral three times a day  FLUoxetine 60 milliGRAM(s) Oral daily  insulin lispro (HumaLOG) corrective regimen sliding scale   SubCutaneous Before meals and at bedtime  lamoTRIgine 25 milliGRAM(s) Oral daily  levETIRAcetam 1250 milliGRAM(s) Oral two times a day  pantoprazole    Tablet 40 milliGRAM(s) Oral before breakfast  senna 2 Tablet(s) Oral at bedtime    MEDICATIONS  (PRN):  dextrose 40% Gel 15 Gram(s) Oral once PRN Blood Glucose LESS THAN 70 milliGRAM(s)/deciliter  glucagon  Injectable 1 milliGRAM(s) IntraMuscular once PRN Glucose LESS THAN 70 milligrams/deciliter  ondansetron Injectable 4 milliGRAM(s) IV Push every 6 hours PRN Nausea and/or Vomiting  zolpidem 5 milliGRAM(s) Oral at bedtime PRN Insomnia  zolpidem 5 milliGRAM(s) Oral at bedtime PRN Insomnia      LABS:                          13.7   16.2  )-----------( 327      ( 29 Jul 2018 07:06 )             41.5     07-29    142  |  104  |  22  ----------------------------<  144<H>  4.3   |  28  |  1.10    Ca    9.2      29 Jul 2018 07:06      IMPRESSION & PLAN:    54 y/o M with GBM s/p b/l craniotomy and tumor resection, admitted with generalized weakness and malaise. Noted by surgical staff to have intermittent RLE twitching during admission; however, pt refusing EEG testing. LEV increased from 1gm BID to 1250mg BID without much improvement in twitching which is likely due to focal motor seizure activity.    1) VEEG when patient agreeable  2) Consider Psychiatry consult to determine capacity for medical decision making  3) Increase Keppra to 1500mg BID and Lamitcal  to 25mg BID (Lamitcal should be slowly titrated as outpatient by his neurologist as it cannot be rapidly increased due to risk of SJS). Keppra may not be the best medication in the long run if underlying depression/anxiety. His outpatient neurologist may wish to taper off Keppra once Lamictal has been properly titrated.  4) Seizure/fall precautions    Discussed above with NSx PA

## 2018-07-29 NOTE — PROGRESS NOTE ADULT - SUBJECTIVE AND OBJECTIVE BOX
HPI:  54y/o M with PMHx sig for anxiety, depression, h/o craniotomy (3/2018 high grade glioma, GBM), h/o seizure presents to ED from home with worsening generalized weakness and difficulty ambulating over the past 2 months. Pt states he has a history of gout and believes he is having a flair-up right now as his elbows and knee joints are very tender to touch. Pt has had his last chemotherapy session last week. Pt is unaware if he was on steroids at home. Denies headaches, acute changes in vision, acute loss of sensation of extremities, facial symmetry, dysarthria, recent seizures, injury/trauma to head, recent seizures. (23 Jul 2018 15:10)    OVERNIGHT EVENTS: ANGELITA overnight. Neuro stable.    7/24: Dopplers positive for Extensive deep vein thrombosis throughout the veins of the right thigh  7/26: no acute events overnight. MRI reviewed with Dr. Mason. was planned for IVC-F today; deferred because patient is eligible for AC  7/27: No issues overnight. Afebrile, neuro stable.   7/28: CT chest performed per Dr. Garrison for further hypercoagulable workup, showing B/L PEs. Vascular re-consulted, recommended to continue eliquis 10mg BID. Overnight, Pt noted to have RLE twitching. Epilepsy consulted, given loading dose keppra 1g, maintenance increased to 1250mg BID. Pt refused EEG monitoring. Neuro stable.   7/29: ANGELITA overnight. Neuro stable.    Vital Signs Last 24 Hrs  T(C): 36.6 (28 Jul 2018 22:00), Max: 36.6 (28 Jul 2018 09:00)  T(F): 97.8 (28 Jul 2018 22:00), Max: 97.9 (28 Jul 2018 09:00)  HR: 58 (28 Jul 2018 23:58) (44 - 78)  BP: 135/78 (28 Jul 2018 23:58) (119/70 - 138/81)  BP(mean): 102 (28 Jul 2018 23:58) (90 - 104)  RR: 18 (28 Jul 2018 23:58) (17 - 18)  SpO2: 99% (28 Jul 2018 23:58) (96% - 99%)    I&O's Summary    27 Jul 2018 07:01  -  28 Jul 2018 07:00  --------------------------------------------------------  IN: 100 mL / OUT: 725 mL / NET: -625 mL    28 Jul 2018 07:01  -  29 Jul 2018 03:43  --------------------------------------------------------  IN: 0 mL / OUT: 275 mL / NET: -275 mL    PHYSICAL EXAM:  Neurological: AAOx3, FC, speech coherent  CNII-XII: EOM intact, PERRL, face symmetric tongue midline  Motor: UE and LE 4+/5 B/l  SILT throughout         [x] warm well perfused; capillary refill <3 seconds   Incision/Wound: scalp craniotomy incision site healing well    TUBES/LINES:  [] Lema  [] Lumbar Drain  [] Wound Drains  [] Others    DIET:  [] NPO  [x] Mechanical  [] Tube feeds      LABS:                        12.9   14.2  )-----------( 315      ( 28 Jul 2018 06:37 )             39.7     07-28    140  |  103  |  27<H>  ----------------------------<  207<H>  4.1   |  24  |  1.09    Ca    8.9      28 Jul 2018 06:37              CAPILLARY BLOOD GLUCOSE      POCT Blood Glucose.: 161 mg/dL (28 Jul 2018 21:52)  POCT Blood Glucose.: 127 mg/dL (28 Jul 2018 17:44)  POCT Blood Glucose.: 171 mg/dL (28 Jul 2018 11:46)  POCT Blood Glucose.: 199 mg/dL (28 Jul 2018 06:16)      Drug Levels: [] N/A    CSF Analysis: [] N/A      Allergies    No Known Allergies    Intolerances      MEDICATIONS:  Antibiotics:    Neuro:  ARIPiprazole 20 milliGRAM(s) Oral daily  FLUoxetine 60 milliGRAM(s) Oral daily  lamoTRIgine 25 milliGRAM(s) Oral daily  levETIRAcetam 1250 milliGRAM(s) Oral two times a day  ondansetron Injectable 4 milliGRAM(s) IV Push every 6 hours PRN  zolpidem 5 milliGRAM(s) Oral at bedtime PRN  zolpidem 5 milliGRAM(s) Oral at bedtime PRN    Anticoagulation:  apixaban 10 milliGRAM(s) Oral every 12 hours    OTHER:  colchicine 0.6 milliGRAM(s) Oral daily  dexamethasone     Tablet 4 milliGRAM(s) Oral every 6 hours  dextrose 40% Gel 15 Gram(s) Oral once PRN  dextrose 50% Injectable 12.5 Gram(s) IV Push once  dextrose 50% Injectable 25 Gram(s) IV Push once  dextrose 50% Injectable 25 Gram(s) IV Push once  docusate sodium 100 milliGRAM(s) Oral three times a day  glucagon  Injectable 1 milliGRAM(s) IntraMuscular once PRN  insulin lispro (HumaLOG) corrective regimen sliding scale   SubCutaneous Before meals and at bedtime  pantoprazole    Tablet 40 milliGRAM(s) Oral before breakfast  senna 2 Tablet(s) Oral at bedtime    IVF:  dextrose 5%. 1000 milliLiter(s) IV Continuous <Continuous>    CULTURES:    RADIOLOGY & ADDITIONAL TESTS:      ASSESSMENT:  53y Male with PMHx seizures, GBM, h/o crani 3/2018 now with generalized weakness, malaise       WEAKNESS  No pertinent family history in first degree relatives  Handoff  MEWS Score  Gout  Anxiety and depression  Seizure disorder  GBM (glioblastoma multiforme)  Weakness  Pulmonary emboli  GBM (glioblastoma multiforme)  Seizure disorder  DVT (deep venous thrombosis)  Anxiety  Bipolar affective disorder, currently depressed, moderate  Bipolar affective disorder, currently depressed, moderate  History of craniotomy  MED EVAL  90+      PLAN:  -neuro checks  -pain control  -continue decadron 4mg q6h  -PPI while on steroids  -pulmonology recs appreciated: continue eliquis 10mg BID for DVTs and PEs  -epilepsy recs appreciated: continue keppra 1250 BID and lamictal  -psychiatry recs appreciated: continue abilify, prozac  -vascular recs appreciated  -bowel regimen  -Dispo pending: [facility]  -family meeting Mon  -D/w Dr. Mason

## 2018-07-29 NOTE — PROGRESS NOTE ADULT - ASSESSMENT
53y old  Male who presented with a chief complaint of progressive worsening weakness of lower extremities, difficulty ambulating dx'd with gliom pt s/p chemo, developed b/l PEs and on eliquis

## 2018-07-30 ENCOUNTER — TRANSCRIPTION ENCOUNTER (OUTPATIENT)
Age: 53
End: 2018-07-30

## 2018-07-30 LAB
ANION GAP SERPL CALC-SCNC: 12 MMOL/L — SIGNIFICANT CHANGE UP (ref 5–17)
BUN SERPL-MCNC: 26 MG/DL — HIGH (ref 7–23)
CALCIUM SERPL-MCNC: 9 MG/DL — SIGNIFICANT CHANGE UP (ref 8.4–10.5)
CHLORIDE SERPL-SCNC: 102 MMOL/L — SIGNIFICANT CHANGE UP (ref 96–108)
CO2 SERPL-SCNC: 25 MMOL/L — SIGNIFICANT CHANGE UP (ref 22–31)
CREAT SERPL-MCNC: 1.09 MG/DL — SIGNIFICANT CHANGE UP (ref 0.5–1.3)
GLUCOSE BLDC GLUCOMTR-MCNC: 127 MG/DL — HIGH (ref 70–99)
GLUCOSE BLDC GLUCOMTR-MCNC: 139 MG/DL — HIGH (ref 70–99)
GLUCOSE BLDC GLUCOMTR-MCNC: 148 MG/DL — HIGH (ref 70–99)
GLUCOSE BLDC GLUCOMTR-MCNC: 176 MG/DL — HIGH (ref 70–99)
GLUCOSE SERPL-MCNC: 141 MG/DL — HIGH (ref 70–99)
HCT VFR BLD CALC: 42.2 % — SIGNIFICANT CHANGE UP (ref 39–50)
HGB BLD-MCNC: 13.7 G/DL — SIGNIFICANT CHANGE UP (ref 13–17)
MAGNESIUM SERPL-MCNC: 2.1 MG/DL — SIGNIFICANT CHANGE UP (ref 1.6–2.6)
MCHC RBC-ENTMCNC: 28.3 PG — SIGNIFICANT CHANGE UP (ref 27–34)
MCHC RBC-ENTMCNC: 32.5 G/DL — SIGNIFICANT CHANGE UP (ref 32–36)
MCV RBC AUTO: 87.2 FL — SIGNIFICANT CHANGE UP (ref 80–100)
PHOSPHATE SERPL-MCNC: 3.2 MG/DL — SIGNIFICANT CHANGE UP (ref 2.5–4.5)
PLATELET # BLD AUTO: 337 K/UL — SIGNIFICANT CHANGE UP (ref 150–400)
POTASSIUM SERPL-MCNC: 4.3 MMOL/L — SIGNIFICANT CHANGE UP (ref 3.5–5.3)
POTASSIUM SERPL-SCNC: 4.3 MMOL/L — SIGNIFICANT CHANGE UP (ref 3.5–5.3)
RBC # BLD: 4.84 M/UL — SIGNIFICANT CHANGE UP (ref 4.2–5.8)
RBC # FLD: 12.8 % — SIGNIFICANT CHANGE UP (ref 10.3–16.9)
SODIUM SERPL-SCNC: 139 MMOL/L — SIGNIFICANT CHANGE UP (ref 135–145)
WBC # BLD: 17 K/UL — HIGH (ref 3.8–10.5)
WBC # FLD AUTO: 17 K/UL — HIGH (ref 3.8–10.5)

## 2018-07-30 PROCEDURE — 99232 SBSQ HOSP IP/OBS MODERATE 35: CPT

## 2018-07-30 PROCEDURE — 99232 SBSQ HOSP IP/OBS MODERATE 35: CPT | Mod: GC

## 2018-07-30 RX ORDER — ZOLPIDEM TARTRATE 10 MG/1
1 TABLET ORAL
Qty: 14 | Refills: 0 | OUTPATIENT
Start: 2018-07-30 | End: 2018-08-12

## 2018-07-30 RX ORDER — LEVETIRACETAM 250 MG/1
1 TABLET, FILM COATED ORAL
Qty: 60 | Refills: 0 | OUTPATIENT
Start: 2018-07-30 | End: 2018-08-28

## 2018-07-30 RX ORDER — LAMOTRIGINE 25 MG/1
1 TABLET, ORALLY DISINTEGRATING ORAL
Qty: 30 | Refills: 0 | OUTPATIENT
Start: 2018-07-30 | End: 2018-08-28

## 2018-07-30 RX ORDER — ARIPIPRAZOLE 15 MG/1
1 TABLET ORAL
Qty: 7 | Refills: 0 | OUTPATIENT
Start: 2018-07-30 | End: 2018-08-05

## 2018-07-30 RX ORDER — RISPERIDONE 4 MG/1
1 TABLET ORAL
Qty: 0 | Refills: 0 | COMMUNITY

## 2018-07-30 RX ORDER — DEXAMETHASONE 0.5 MG/5ML
1 ELIXIR ORAL
Qty: 0 | Refills: 0 | COMMUNITY
Start: 2018-07-30

## 2018-07-30 RX ORDER — PANTOPRAZOLE SODIUM 20 MG/1
1 TABLET, DELAYED RELEASE ORAL
Qty: 30 | Refills: 0 | OUTPATIENT
Start: 2018-07-30 | End: 2018-08-28

## 2018-07-30 RX ORDER — APIXABAN 2.5 MG/1
2 TABLET, FILM COATED ORAL
Qty: 20 | Refills: 0 | OUTPATIENT
Start: 2018-07-30 | End: 2018-08-03

## 2018-07-30 RX ORDER — RISPERIDONE 4 MG/1
1 TABLET ORAL
Qty: 14 | Refills: 0 | OUTPATIENT
Start: 2018-07-30 | End: 2018-08-05

## 2018-07-30 RX ADMIN — Medication 4 MILLIGRAM(S): at 00:09

## 2018-07-30 RX ADMIN — ARIPIPRAZOLE 20 MILLIGRAM(S): 15 TABLET ORAL at 14:09

## 2018-07-30 RX ADMIN — Medication 0.6 MILLIGRAM(S): at 12:15

## 2018-07-30 RX ADMIN — Medication 4 MILLIGRAM(S): at 23:02

## 2018-07-30 RX ADMIN — ZOLPIDEM TARTRATE 5 MILLIGRAM(S): 10 TABLET ORAL at 23:02

## 2018-07-30 RX ADMIN — LAMOTRIGINE 25 MILLIGRAM(S): 25 TABLET, ORALLY DISINTEGRATING ORAL at 17:44

## 2018-07-30 RX ADMIN — LEVETIRACETAM 1500 MILLIGRAM(S): 250 TABLET, FILM COATED ORAL at 06:02

## 2018-07-30 RX ADMIN — Medication 4 MILLIGRAM(S): at 17:42

## 2018-07-30 RX ADMIN — Medication 4 MILLIGRAM(S): at 12:18

## 2018-07-30 RX ADMIN — LEVETIRACETAM 1500 MILLIGRAM(S): 250 TABLET, FILM COATED ORAL at 17:42

## 2018-07-30 RX ADMIN — LAMOTRIGINE 25 MILLIGRAM(S): 25 TABLET, ORALLY DISINTEGRATING ORAL at 06:02

## 2018-07-30 RX ADMIN — Medication 4 MILLIGRAM(S): at 06:02

## 2018-07-30 RX ADMIN — APIXABAN 10 MILLIGRAM(S): 2.5 TABLET, FILM COATED ORAL at 06:03

## 2018-07-30 RX ADMIN — PANTOPRAZOLE SODIUM 40 MILLIGRAM(S): 20 TABLET, DELAYED RELEASE ORAL at 06:02

## 2018-07-30 RX ADMIN — Medication 60 MILLIGRAM(S): at 12:18

## 2018-07-30 RX ADMIN — APIXABAN 10 MILLIGRAM(S): 2.5 TABLET, FILM COATED ORAL at 17:42

## 2018-07-30 RX ADMIN — Medication 100 MILLIGRAM(S): at 23:02

## 2018-07-30 RX ADMIN — Medication 2: at 12:36

## 2018-07-30 RX ADMIN — Medication 100 MILLIGRAM(S): at 06:02

## 2018-07-30 NOTE — DISCHARGE NOTE ADULT - HOSPITAL COURSE
54y/o M with PMHx sig for anxiety, depression, h/o craniotomy (3/2018 high grade glioma, GBM), h/o seizure presents to ED from home with worsening generalized weakness and difficulty ambulating over the past 2 months. Pt states he has a history of gout and believes he is having a flair-up right now as his elbows and knee joints are very tender to touch. Pt has had his last chemotherapy session last week. Pt is unaware if he was on steroids at home. Denies headaches, acute changes in vision, acute loss of sensation of extremities, facial symmetry, dysarthria, recent seizures, injury/trauma to head, recent seizures.    -Neurology Epilepsy was consulted and patient changed to Keppra 1000mg PO BID and added Lamictal 25mg twice daily. EEG was refused by patient, tremors improved.  -Patient also found to have extensive DVTs of RLE with PE on CT angiogram of the chest, started on Apixaban twice daily.  -MRI of the Brain 7/24 performed and reviewed, no surgical intervention anticipated, patient will follow-up with Dr. Miguel as outpatient for further management of chemo/RT.  -PT/OT evaluation completed with recommendation for subacute rehab but patient refused and requested to go home with home services, as were provided. 54y/o M with PMHx sig for anxiety, depression, h/o craniotomy (3/2018 high grade glioma, GBM), h/o seizure presents to ED from home with worsening generalized weakness and difficulty ambulating over the past 2 months. Pt states he has a history of gout and believes he is having a flair-up right now as his elbows and knee joints are very tender to touch. Pt has had his last chemotherapy session last week. Pt is unaware if he was on steroids at home. Denies headaches, acute changes in vision, acute loss of sensation of extremities, facial symmetry, dysarthria, recent seizures, injury/trauma to head, recent seizures.    -Neurology Epilepsy was consulted and patient changed to Keppra 1000mg PO BID and added Lamictal 25mg twice daily. EEG was refused by patient, tremors improved. Pt discharged with home care and [facility]  -Patient also found to have extensive DVTs of RLE with PE on CT angiogram of the chest, started on Apixaban twice daily.  -MRI of the Brain 7/24 performed and reviewed, no surgical intervention anticipated, patient will follow-up with Dr. Miguel as outpatient for further management of chemo/RT.  -PT/OT evaluation completed with recommendation for subacute rehab but patient refused and requested to go home with home services, as were provided.

## 2018-07-30 NOTE — CONSULT NOTE ADULT - SUBJECTIVE AND OBJECTIVE BOX
Mr. Hernandez was seen in evaluation. He is a 52 yo man being treated for a multifocal GBM- he intially presented with a GTC and was found to have a right parietal mass which was resected at [place] - path was GBM, IDH wt, MGMT methylated, EGFR amplified. He was treated by Dr. Cho at [facility] with STUPP protocol - had 2 cycle sof adjuvant TMZ and was found to have radiographic progression on MRI - with a new lesion in the left parietal lobe - this was resected by Dr. Mason on 3/13/18 - pathology was GBM, IDH WT, MGMT unmethylated, and EGFR amplified. He received RT with IV Avastin - he has had a total of 7 infusions.    He was admitted last week to [facility] with back pain - found to have PE. MRI brain without new enhancement - slight increased flair abnormality in the left thalamus.    Other issues include significant depression (with prior hospitalizations). He has no family. He has close friends.  During this hospitalization, he has been noted to have right LE shaking - Keppra was increased - Neurology recommended EEG monitoring which he has refused. Lamictal was increased to 25mg bid and Keppra to 1250 gm bid.    Currently, he is sleeping but arouses to voice - he is oriented and fluent  Follows commands - VFF  No clear motor deficits but does not participate in direct muscle testing.    Plan is for discharge with home health aide.    WIll need to see in office for follow up.  Recommend continued psych follow up as well.

## 2018-07-30 NOTE — DISCHARGE NOTE ADULT - MEDICATION SUMMARY - MEDICATIONS TO TAKE
I will START or STAY ON the medications listed below when I get home from the hospital:    dexamethasone 2 mg oral tablet  -- 1 tab(s) by mouth 2 times a day  -- Indication: For GBM (glioblastoma multiforme)    acetaminophen 325 mg oral tablet  -- 2 tab(s) by mouth every 6 hours, As needed, Mild Pain (1 - 3)  -- Indication: For GBM (glioblastoma multiforme)    apixaban 5 mg oral tablet  -- 2 tab(s) by mouth every 12 hours MDD:2  -- Indication: For Pulmonary emboli    apixaban 5 mg oral tablet  -- 1 tab(s) by mouth 2 times a day MDD:2  -- Indication: For Pulmonary emboli    levETIRAcetam 1000 mg oral tablet  -- 1 tab(s) by mouth every 12 hours MDD:2  -- Indication: For Seizure disorder    lamoTRIgine 25 mg oral tablet  -- 1 tab(s) by mouth once a day MDD:1  -- Indication: For Seizure disorder    traZODone 50 mg oral tablet  -- 1 tab(s) by mouth once a day (at bedtime), As needed, insomnia  -- Indication: For insomnia    FLUoxetine 20 mg oral capsule  -- 1 cap(s) by mouth once a day  -- Indication: For Deppression    colchicine 0.6 mg oral tablet  -- 1 tab(s) by mouth once a day  -- Indication: For Gout    ARIPiprazole 15 mg oral tablet  -- 1 tab(s) by mouth once a day MDD:1  -- Indication: For Anxiety    risperiDONE 0.5 mg oral tablet  -- 1 tab(s) by mouth 2 times a day MDD:2  -- Indication: For Anxiety    zolpidem 5 mg oral tablet  -- 1 tab(s) by mouth once a day (at bedtime), As needed, Insomnia MDD:1  -- Indication: For Insomnia    docusate sodium 100 mg oral capsule  -- 1 cap(s) by mouth 3 times a day  -- Indication: For Constipation    senna oral tablet  -- 1 tab(s) by mouth once a day (at bedtime)  -- Indication: For Constipation    pantoprazole 40 mg oral delayed release tablet  -- 1 tab(s) by mouth once a day MDD:1  -- Indication: For GBM (glioblastoma multiforme)

## 2018-07-30 NOTE — PROGRESS NOTE ADULT - SUBJECTIVE AND OBJECTIVE BOX
Interval Events: Reviewed  Patient seen and examined at bedside.    Patient is a 53y old  Male who presents with a chief complaint of progressive worsening weakness of lower extremities, difficulty ambulating (23 Jul 2018 15:10)  Is doing okay. He is not short of breath. Is not coughing. No bleeding    PAST MEDICAL & SURGICAL HISTORY:  Gout  Anxiety and depression  Seizure disorder  GBM (glioblastoma multiforme)  History of craniotomy: 6/2017      MEDICATIONS:  Pulmonary:    Antimicrobials:    Anticoagulants:  apixaban 10 milliGRAM(s) Oral every 12 hours    Cardiac:      Allergies    No Known Allergies    Intolerances        Vital Signs Last 24 Hrs  T(C): 36.4 (27 Jul 2018 17:55), Max: 36.7 (27 Jul 2018 05:28)  T(F): 97.6 (27 Jul 2018 17:55), Max: 98 (27 Jul 2018 05:28)  HR: 58 (27 Jul 2018 20:20) (44 - 60)  BP: 126/67 (27 Jul 2018 20:20) (116/76 - 147/84)  BP(mean): 91 (27 Jul 2018 20:20) (91 - 110)  RR: 18 (27 Jul 2018 20:20) (18 - 18)  SpO2: 97% (27 Jul 2018 20:20) (95% - 100%)    07-26 @ 07:01  -  07-27 @ 07:00  --------------------------------------------------------  IN: 300 mL / OUT: 1350 mL / NET: -1050 mL          LABS:      CBC Full  -  ( 27 Jul 2018 06:15 )  WBC Count : 13.0 K/uL  Hemoglobin : 12.5 g/dL  Hematocrit : 40.1 %  Platelet Count - Automated : 299 K/uL  Mean Cell Volume : 90.3 fL  Mean Cell Hemoglobin : 28.2 pg  Mean Cell Hemoglobin Concentration : 31.2 g/dL  Auto Neutrophil # : x  Auto Lymphocyte # : x  Auto Monocyte # : x  Auto Eosinophil # : x  Auto Basophil # : x  Auto Neutrophil % : x  Auto Lymphocyte % : x  Auto Monocyte % : x  Auto Eosinophil % : x  Auto Basophil % : x    07-27    142  |  107  |  25<H>  ----------------------------<  143<H>  4.6   |  25  |  1.15    Ca    9.0      27 Jul 2018 06:15  < from: CT Angio Chest PE Protocol w/ IV Cont (07.27.18 @ 15:22) >  EXAM:  CT ANGIO CHEST PE PROTOCOL IC                          PROCEDURE DATE:  07/27/2018          INTERPRETATION:  CTA (CT angiography) of the CHEST     INDICATION: 53-year-old male with Weakness status post recent brain   surgery for GBM. Assess for pulmonary embolism.    TECHNIQUE: CT angiography of the chest was performed during bolus   injection of intravenous contrast.  Post-processing including the   production of axial, coronal and sagittal multiplanar reformatted images   and axial andcoronal maximum intensity projections (MIPs) was performed.   Timing of contrast bolus was tailored to evaluate the pulmonary arteries.   Contrast bolus is excellent. Contrast: 112 cc of Optiray 350 was   administered intravenously. 13 cc was discarded.    PRIOR STUDY: None.    FINDINGS:   There are filling defects seen in the left lower lobar pulmonary artery   extending into the segmental branches of the lingula and lower lobe.   There are also small filling defects in the right lower lobe posterior   basilar subsegmental arterial branches. No central embolus is seen. There   is no evidence of right heart strain.    The thoracic aorta is normal in appearance without aneurysm or   dissection. The heart is normal in size. There is no evidenceof right   heart strain. No pericardial effusion is seen.  No mediastinal, hilar or   axillary lymphadenopathy is seen.    No focal airspace consolidation, pleural effusions or pneumothorax is   seen. No pulmonary abnormalities are evident.      Limited evaluation of the upper abdomen demonstrates no abnormality.     Evaluation of the osseous structures demonstrates degenerative changes of   the spine.      IMPRESSION:   Bilateral pulmonary emboli in the left lower lobar artery continuing to   multiple segmental arteries and right lower lobe subsegmental arteries.   No evidence of right heart strain.    No focal airspace consolidation, pleural effusion or pneumothorax.    < end of copied text >                          RADIOLOGY & ADDITIONAL STUDIES (The following images were personally reviewed):  Lmea:                                     No  Urine output:                       adequate  DVT prophylaxis:                 Yes  Flattus:                                  Yes  Bowel movement:              No

## 2018-07-30 NOTE — PROGRESS NOTE ADULT - SUBJECTIVE AND OBJECTIVE BOX
HPI:  52y/o M with PMHx sig for anxiety, depression, h/o craniotomy (3/2018 high grade glioma, GBM), h/o seizure presents to ED from home with worsening generalized weakness and difficulty ambulating over the past 2 months. Pt states he has a history of gout and believes he is having a flair-up right now as his elbows and knee joints are very tender to touch. Pt has had his last chemotherapy session last week. Pt is unaware if he was on steroids at home. Denies headaches, acute changes in vision, acute loss of sensation of extremities, facial symmetry, dysarthria, recent seizures, injury/trauma to head, recent seizures. (23 Jul 2018 15:10)    OVERNIGHT EVENTS: No significant events overnight. Patient with poor appetite. Denies any further extremity twitching.    Vital Signs Last 24 Hrs  T(C): 36.6 (30 Jul 2018 09:25), Max: 37 (30 Jul 2018 05:20)  T(F): 97.9 (30 Jul 2018 09:25), Max: 98.6 (30 Jul 2018 05:20)  HR: 52 (30 Jul 2018 10:49) (42 - 76)  BP: 140/91 (30 Jul 2018 10:49) (110/60 - 140/91)  BP(mean): 93 (30 Jul 2018 08:28) (87 - 101)  RR: 16 (30 Jul 2018 04:33) (16 - 18)  SpO2: 98% (30 Jul 2018 08:28) (96% - 98%)    I&O's Summary    29 Jul 2018 07:01  -  30 Jul 2018 07:00  --------------------------------------------------------  IN: 390 mL / OUT: 1825 mL / NET: -1435 mL    30 Jul 2018 07:01  -  30 Jul 2018 11:46  --------------------------------------------------------  IN: 0 mL / OUT: 175 mL / NET: -175 mL        PHYSICAL EXAM:  Gen: NAD, AAOx3  HEENT: PERRL. EOMI. Left scalp incision well healed.  Neck: FROM, nontender  Lungs: Clear b/l  Heart: S1, S2. NSR.  Abd: Soft, NT/ND. +BS  Exts: Pulses 2+ throughout  Neuro: CNs II-XII intact. 5/5 str x4 extremities. Sensation to LT intact throughout. Following commands. Clear speech.    TUBES/LINES:  [] Lema  [] Lumbar Drain  [] Wound Drains  [] Others      DIET:  [] NPO  [x] Mechanical  [] Tube feeds    LABS:                        13.7   17.0  )-----------( 337      ( 30 Jul 2018 05:38 )             42.2     07-30    139  |  102  |  26<H>  ----------------------------<  141<H>  4.3   |  25  |  1.09    Ca    9.0      30 Jul 2018 05:40  Phos  3.2     07-30  Mg     2.1     07-30              CAPILLARY BLOOD GLUCOSE      POCT Blood Glucose.: 176 mg/dL (30 Jul 2018 11:39)  POCT Blood Glucose.: 139 mg/dL (30 Jul 2018 06:24)  POCT Blood Glucose.: 138 mg/dL (29 Jul 2018 22:33)  POCT Blood Glucose.: 146 mg/dL (29 Jul 2018 17:13)      Drug Levels: [] N/A    CSF Analysis: [] N/A      Allergies    No Known Allergies    Intolerances      MEDICATIONS:  Antibiotics:    Neuro:  ARIPiprazole 20 milliGRAM(s) Oral daily  FLUoxetine 60 milliGRAM(s) Oral daily  lamoTRIgine 25 milliGRAM(s) Oral two times a day  levETIRAcetam 1500 milliGRAM(s) Oral two times a day  ondansetron Injectable 4 milliGRAM(s) IV Push every 6 hours PRN  zolpidem 5 milliGRAM(s) Oral at bedtime PRN  zolpidem 5 milliGRAM(s) Oral at bedtime PRN    Anticoagulation:  apixaban 10 milliGRAM(s) Oral every 12 hours    OTHER:  colchicine 0.6 milliGRAM(s) Oral daily  dexamethasone     Tablet 4 milliGRAM(s) Oral every 6 hours  dextrose 40% Gel 15 Gram(s) Oral once PRN  dextrose 50% Injectable 12.5 Gram(s) IV Push once  dextrose 50% Injectable 25 Gram(s) IV Push once  dextrose 50% Injectable 25 Gram(s) IV Push once  docusate sodium 100 milliGRAM(s) Oral three times a day  glucagon  Injectable 1 milliGRAM(s) IntraMuscular once PRN  insulin lispro (HumaLOG) corrective regimen sliding scale   SubCutaneous Before meals and at bedtime  pantoprazole    Tablet 40 milliGRAM(s) Oral before breakfast  senna 2 Tablet(s) Oral at bedtime    IVF:  dextrose 5%. 1000 milliLiter(s) IV Continuous <Continuous>    CULTURES:    RADIOLOGY & ADDITIONAL TESTS:  < from: MR Head w/wo IV Cont (07.24.18 @ 23:21) >  Patient status post left parietal craniotomy with   postsurgical changes. New FLAIR/T2 signal abnormality within the left   thalamus standing to the periatrial whitematter without enhancement.   Stable FLAIR/T2 signal abnormality within the left hippocampal region and   left parietal lobe.      ASSESSMENT:  53y Male w/ Anxiety, Depression, h/o left craniotomy for GBM resection 03/2018 with RLE twitching and findings of b/l PEs now on Eliquis      PLAN:  -AEDs added/Increased as per Dr. Owens, patient refusing EEG,  -Psychiatry consult for competency,  -PT recommending subacute rehab however patient refusing rehab, will need home DC with service,  -Continue Eliquis for AC for b/l PEs,  -Encourage OOB and PO as tolerated,  -Continue Decadron 4mg q6h, PPI, ISS  -Pending family meeting to discuss further treatments,  -Will d/w Dr. Mason

## 2018-07-30 NOTE — PROGRESS NOTE ADULT - PROBLEM SELECTOR PLAN 4
continue keppra per neuro
CT scan of chest revealed multiple bilateral pulmonary emboli. Is no clinical note radiological evidence of right-sided straight
CT scan of chest revealed multiple bilateral pulmonary emboli. Is no clinical note radiological evidence of right-sided straight

## 2018-07-30 NOTE — DISCHARGE NOTE ADULT - CARE PROVIDERS DIRECT ADDRESSES
[email or web address]

## 2018-07-30 NOTE — PROGRESS NOTE ADULT - PROBLEM SELECTOR PLAN 1
The patient is to continue with anticoagulation.  No bleeding
pt s/p chemo, continue neuro checks and steroids per neurosx.
The patient is to continue with anticoagulation.

## 2018-07-30 NOTE — PROGRESS NOTE ADULT - ASSESSMENT
52 y/o M with GBM s/p b/l craniotomy and tumor resection, admitted with generalized weakness and malaise. Noted by surgical staff to have intermittent RLE twitching during admission; however, pt refusing EEG testing. LEV increased from 1gm BID to 1250mg BID without much improvement in twitching which is likely due to focal motor seizure activity.    Plan:  1. Continue Keppra 1500mg BID  2. Continue Lamictal 25MG BID  3. Monitor Neurological assessments Qshift  4. Maintain Seizure & fall precautions 54 y/o M with GBM s/p b/l craniotomy and tumor resection, admitted with generalized weakness and malaise. Noted by surgical staff to have intermittent RLE twitching during admission; however, pt refusing EEG testing. LEV increased from 1gm BID to 1250mg BID without much improvement in twitching which is likely due to focal motor seizure activity.    Plan:  1. Continue Keppra 1500mg BID  2. Continue Lamictal 25MG BID  3. Monitor Neurological assessments Qshift  4. Maintain Seizure & fall precautions  5. vEEG if patient agreeable.

## 2018-07-30 NOTE — DISCHARGE NOTE ADULT - PATIENT PORTAL LINK FT
You can access the [facility] Patient Portal, offered by [facility], by registering with the following website: [email or web address]

## 2018-07-30 NOTE — DISCHARGE NOTE ADULT - MEDICATION SUMMARY - MEDICATIONS TO STOP TAKING
I will STOP taking the medications listed below when I get home from the hospital:    enoxaparin  -- 40 unit(s) subcutaneous once a day (at bedtime)    oxyCODONE-acetaminophen 5 mg-325 mg oral tablet  -- 1 tab(s) by mouth every 4 hours, As needed, Moderate Pain (4 - 6)    oxyCODONE-acetaminophen 5 mg-325 mg oral tablet  -- 2 tab(s) by mouth every 6 hours, As needed, Severe Pain (7 - 10)    dexamethasone  -- Take 4mg every 12 hours for 2 days, then 2mg every 12 hours for 5 days    insulin lispro  -- 2 Unit(s) if Glucose 151 - 200  4 Unit(s) if Glucose 201 - 250  6 Unit(s) if Glucose 251 - 300  8 Unit(s) if Glucose 301 - 350  10 Unit(s) if Glucose 351 - 400  12 Unit(s) if Glucose Greater Than 400    FLUoxetine 20 mg oral capsule  -- 1 cap(s) by mouth once a day    risperiDONE 0.5 mg oral tablet  -- 1 tab(s) by mouth 2 times a day

## 2018-07-30 NOTE — PROGRESS NOTE ADULT - SUBJECTIVE AND OBJECTIVE BOX
Subjective  Patient seen in bed. No events overnight. Report intermittent tremor of RLE, but ok at this time.    Review of Systems:  CONSTITUTIONAL:  No weight loss, fever, chills, weakness or fatigue.  HEENT:  Eyes:  No visual loss, blurred vision, double vision or yellow sclerae. Ears, Nose, Throat:  No hearing loss, sneezing, congestion, runny nose or sore throat.  SKIN:  No rash or itching.  CARDIOVASCULAR:  No chest pain, chest pressure or chest discomfort. No palpitations or edema.  RESPIRATORY:  No shortness of breath, cough or sputum.  GASTROINTESTINAL:  No anorexia, nausea, vomiting or diarrhea. No abdominal pain or blood.  GENITOURINARY: No Burning on urination.   NEUROLOGICAL:  see HPI  MUSCULOSKELETAL:  No muscle, back pain, joint pain or stiffness.  HEMATOLOGIC:  No anemia, bleeding or bruising.  LYMPHATICS:  No enlarged nodes. No history of splenectomy.  PSYCHIATRIC:  No history of depression or anxiety.  ENDOCRINOLOGIC:  No reports of sweating, cold or heat intolerance. No polyuria or polydipsia.  ALLERGIES:  No history of asthma, hives, eczema or rhinitis.       MEDICATIONS  (STANDING):  apixaban 10 milliGRAM(s) Oral every 12 hours  ARIPiprazole 20 milliGRAM(s) Oral daily  colchicine 0.6 milliGRAM(s) Oral daily  dexamethasone     Tablet 4 milliGRAM(s) Oral every 6 hours  dextrose 5%. 1000 milliLiter(s) (50 mL/Hr) IV Continuous <Continuous>  dextrose 50% Injectable 12.5 Gram(s) IV Push once  dextrose 50% Injectable 25 Gram(s) IV Push once  dextrose 50% Injectable 25 Gram(s) IV Push once  docusate sodium 100 milliGRAM(s) Oral three times a day  FLUoxetine 60 milliGRAM(s) Oral daily  insulin lispro (HumaLOG) corrective regimen sliding scale   SubCutaneous Before meals and at bedtime  lamoTRIgine 25 milliGRAM(s) Oral two times a day  levETIRAcetam 1500 milliGRAM(s) Oral two times a day  pantoprazole    Tablet 40 milliGRAM(s) Oral before breakfast  senna 2 Tablet(s) Oral at bedtime    MEDICATIONS  (PRN):  dextrose 40% Gel 15 Gram(s) Oral once PRN Blood Glucose LESS THAN 70 milliGRAM(s)/deciliter  glucagon  Injectable 1 milliGRAM(s) IntraMuscular once PRN Glucose LESS THAN 70 milligrams/deciliter  ondansetron Injectable 4 milliGRAM(s) IV Push every 6 hours PRN Nausea and/or Vomiting  zolpidem 5 milliGRAM(s) Oral at bedtime PRN Insomnia  zolpidem 5 milliGRAM(s) Oral at bedtime PRN Insomnia      T(C): 36.2 (07-30-18 @ 14:16), Max: 37 (07-30-18 @ 05:20)  HR: 46 (07-30-18 @ 12:16) (42 - 76)  BP: 148/77 (07-30-18 @ 12:16) (110/60 - 148/77)  RR: 18 (07-30-18 @ 12:16) (16 - 18)  SpO2: 98% (07-30-18 @ 12:16) (96% - 98%)  Wt(kg): --    Patient sleeping, but easily arousable  to name. Eyes open spontaneously. Conversational with appropriate sentences.    CBC Full  -  ( 30 Jul 2018 05:38 )  WBC Count : 17.0 K/uL  Hemoglobin : 13.7 g/dL  Hematocrit : 42.2 %  Platelet Count - Automated : 337 K/uL  Mean Cell Volume : 87.2 fL  Mean Cell Hemoglobin : 28.3 pg  Mean Cell Hemoglobin Concentration : 32.5 g/dL  Auto Neutrophil # : x  Auto Lymphocyte # : x  Auto Monocyte # : x  Auto Eosinophil # : x  Auto Basophil # : x  Auto Neutrophil % : x  Auto Lymphocyte % : x  Auto Monocyte % : x  Auto Eosinophil % : x  Auto Basophil % : x    07-30    139  |  102  |  26<H>  ----------------------------<  141<H>  4.3   |  25  |  1.09    Ca    9.0      30 Jul 2018 05:40  Phos  3.2     07-30  Mg     2.1     07-30

## 2018-07-30 NOTE — DISCHARGE NOTE ADULT - PLAN OF CARE
Discharge home Patient will continue outpatient follow-up for further oncologic and radiation oncology therapy.  Continue Keppra and Lamictal as prescribed,  Continue Decadron 2mg twice daily with Pantoprazole for GI protection, Continue previous home medications Continue Colchicine Continue Apixaban 10mg until 8/4 then continue 5mg twice daily for Pulmonary Embolism. Continue medications as prescribed, continue Neurology follow-up.

## 2018-07-30 NOTE — DISCHARGE NOTE ADULT - NS AS ACTIVITY OBS
Walking-Outdoors allowed/Bathing allowed/Showering allowed/Walking-Indoors allowed/Do not drive or operate machinery/Do not make important decisions/No Heavy lifting/straining

## 2018-07-30 NOTE — DISCHARGE NOTE ADULT - CARE PLAN
Principal Discharge DX:	GBM (glioblastoma multiforme)  Goal:	Discharge home  Assessment and plan of treatment:	Patient will continue outpatient follow-up for further oncologic and radiation oncology therapy.  Continue Keppra and Lamictal as prescribed,  Continue Decadron 2mg twice daily with Pantoprazole for GI protection,  Secondary Diagnosis:	Anxiety and depression  Assessment and plan of treatment:	Continue previous home medications  Secondary Diagnosis:	Gout  Assessment and plan of treatment:	Continue Colchicine  Secondary Diagnosis:	Pulmonary emboli  Assessment and plan of treatment:	Continue Apixaban 10mg until 8/4 then continue 5mg twice daily for Pulmonary Embolism.  Secondary Diagnosis:	Seizure disorder  Assessment and plan of treatment:	Continue medications as prescribed, continue Neurology follow-up.

## 2018-07-30 NOTE — DISCHARGE NOTE ADULT - CARE PROVIDER_API CALL
Mikala Miguel), Neurology  [facility] [place]  [address] [place] [address]  Phone: [phone]  Fax: [phone]    Vishnu Mason), Neurological Surgery  [address] [address]  Phone: [phone]  Fax: [phone]    Vidya Garrison), Critical Care Medicine; Pulmonary Disease  [address] [address]  Phone: [phone]  Fax: [phone] Mikala Miguel), Neurology  [facility] [place]  [address] [place] [address]  Phone: [phone]  Fax: [phone]    Vishnu Mason), Neurological Surgery  [address] [address]  Phone: [phone]  Fax: [phone]    Vidya Garrison), Critical Care Medicine; Pulmonary Disease  [address] [address]  Phone: [phone]  Fax: [phone]

## 2018-07-31 VITALS
SYSTOLIC BLOOD PRESSURE: 130 MMHG | OXYGEN SATURATION: 97 % | DIASTOLIC BLOOD PRESSURE: 87 MMHG | RESPIRATION RATE: 15 BRPM | HEART RATE: 50 BPM

## 2018-07-31 LAB
GLUCOSE BLDC GLUCOMTR-MCNC: 123 MG/DL — HIGH (ref 70–99)
GLUCOSE BLDC GLUCOMTR-MCNC: 127 MG/DL — HIGH (ref 70–99)

## 2018-07-31 PROCEDURE — 80048 BASIC METABOLIC PNL TOTAL CA: CPT

## 2018-07-31 PROCEDURE — 99285 EMERGENCY DEPT VISIT HI MDM: CPT | Mod: 25

## 2018-07-31 PROCEDURE — 82962 GLUCOSE BLOOD TEST: CPT

## 2018-07-31 PROCEDURE — 85610 PROTHROMBIN TIME: CPT

## 2018-07-31 PROCEDURE — 83735 ASSAY OF MAGNESIUM: CPT

## 2018-07-31 PROCEDURE — 84100 ASSAY OF PHOSPHORUS: CPT

## 2018-07-31 PROCEDURE — 97162 PT EVAL MOD COMPLEX 30 MIN: CPT

## 2018-07-31 PROCEDURE — 85025 COMPLETE CBC W/AUTO DIFF WBC: CPT

## 2018-07-31 PROCEDURE — 85730 THROMBOPLASTIN TIME PARTIAL: CPT

## 2018-07-31 PROCEDURE — 83036 HEMOGLOBIN GLYCOSYLATED A1C: CPT

## 2018-07-31 PROCEDURE — 97116 GAIT TRAINING THERAPY: CPT

## 2018-07-31 PROCEDURE — 85027 COMPLETE CBC AUTOMATED: CPT

## 2018-07-31 PROCEDURE — 97110 THERAPEUTIC EXERCISES: CPT

## 2018-07-31 PROCEDURE — 80053 COMPREHEN METABOLIC PANEL: CPT

## 2018-07-31 PROCEDURE — 70553 MRI BRAIN STEM W/O & W/DYE: CPT

## 2018-07-31 PROCEDURE — 86901 BLOOD TYPING SEROLOGIC RH(D): CPT

## 2018-07-31 PROCEDURE — 93970 EXTREMITY STUDY: CPT

## 2018-07-31 PROCEDURE — 96374 THER/PROPH/DIAG INJ IV PUSH: CPT

## 2018-07-31 PROCEDURE — 86850 RBC ANTIBODY SCREEN: CPT

## 2018-07-31 PROCEDURE — A9585: CPT

## 2018-07-31 PROCEDURE — 70450 CT HEAD/BRAIN W/O DYE: CPT

## 2018-07-31 PROCEDURE — 71045 X-RAY EXAM CHEST 1 VIEW: CPT

## 2018-07-31 PROCEDURE — 86900 BLOOD TYPING SEROLOGIC ABO: CPT

## 2018-07-31 PROCEDURE — 36415 COLL VENOUS BLD VENIPUNCTURE: CPT

## 2018-07-31 PROCEDURE — 71275 CT ANGIOGRAPHY CHEST: CPT

## 2018-07-31 RX ADMIN — Medication 60 MILLIGRAM(S): at 11:57

## 2018-07-31 RX ADMIN — APIXABAN 10 MILLIGRAM(S): 2.5 TABLET, FILM COATED ORAL at 06:47

## 2018-07-31 RX ADMIN — LEVETIRACETAM 1500 MILLIGRAM(S): 250 TABLET, FILM COATED ORAL at 06:47

## 2018-07-31 RX ADMIN — ARIPIPRAZOLE 20 MILLIGRAM(S): 15 TABLET ORAL at 11:57

## 2018-07-31 RX ADMIN — Medication 4 MILLIGRAM(S): at 11:57

## 2018-07-31 RX ADMIN — Medication 4 MILLIGRAM(S): at 06:47

## 2018-07-31 RX ADMIN — Medication 0.6 MILLIGRAM(S): at 11:57

## 2018-07-31 RX ADMIN — ZOLPIDEM TARTRATE 5 MILLIGRAM(S): 10 TABLET ORAL at 01:32

## 2018-07-31 RX ADMIN — LAMOTRIGINE 25 MILLIGRAM(S): 25 TABLET, ORALLY DISINTEGRATING ORAL at 06:47

## 2018-07-31 NOTE — PROGRESS NOTE ADULT - SUBJECTIVE AND OBJECTIVE BOX
Interval history:    Patient seen in bed. No events overnight. Report intermittent tremor of RLE, but ok at this time. Report awaiting VEEG, and refuse exam.     Review of Systems:  CONSTITUTIONAL:  No weight loss, fever, chills, weakness or fatigue.  HEENT:  Eyes:  No visual loss, blurred vision, double vision or yellow sclerae. Ears, Nose, Throat:  No hearing loss, sneezing, congestion, runny nose or sore throat.  SKIN:  No rash or itching.  CARDIOVASCULAR:  No chest pain, chest pressure or chest discomfort. No palpitations or edema.  RESPIRATORY:  No shortness of breath, cough or sputum.  GASTROINTESTINAL:  No anorexia, nausea, vomiting or diarrhea. No abdominal pain or blood.  NEUROLOGICAL:  see HPI  MUSCULOSKELETAL:  No muscle, back pain, joint pain or stiffness.  HEMATOLOGIC:  No anemia, bleeding or bruising.  LYMPHATICS:  No enlarged nodes. No history of splenectomy.  PSYCHIATRIC:  No history of depression or anxiety.  ENDOCRINOLOGIC:  No reports of sweating, cold or heat intolerance. No polyuria or polydipsia.  ALLERGIES:  No history of asthma, hives, eczema or rhinitis.       MEDICATIONS  (STANDING):  apixaban 10 milliGRAM(s) Oral every 12 hours  ARIPiprazole 20 milliGRAM(s) Oral daily  colchicine 0.6 milliGRAM(s) Oral daily  dexamethasone     Tablet 4 milliGRAM(s) Oral every 6 hours  dextrose 5%. 1000 milliLiter(s) (50 mL/Hr) IV Continuous <Continuous>  dextrose 50% Injectable 12.5 Gram(s) IV Push once  dextrose 50% Injectable 25 Gram(s) IV Push once  dextrose 50% Injectable 25 Gram(s) IV Push once  docusate sodium 100 milliGRAM(s) Oral three times a day  FLUoxetine 60 milliGRAM(s) Oral daily  insulin lispro (HumaLOG) corrective regimen sliding scale   SubCutaneous Before meals and at bedtime  lamoTRIgine 25 milliGRAM(s) Oral two times a day  levETIRAcetam 1500 milliGRAM(s) Oral two times a day  pantoprazole    Tablet 40 milliGRAM(s) Oral before breakfast  senna 2 Tablet(s) Oral at bedtime    MEDICATIONS  (PRN):  dextrose 40% Gel 15 Gram(s) Oral once PRN Blood Glucose LESS THAN 70 milliGRAM(s)/deciliter  glucagon  Injectable 1 milliGRAM(s) IntraMuscular once PRN Glucose LESS THAN 70 milligrams/deciliter  ondansetron Injectable 4 milliGRAM(s) IV Push every 6 hours PRN Nausea and/or Vomiting  zolpidem 5 milliGRAM(s) Oral at bedtime PRN Insomnia      T(C): 36.4 (07-31-18 @ 05:05), Max: 36.6 (07-30-18 @ 17:15)  HR: 50 (07-31-18 @ 09:02) (50 - 82)  BP: 130/87 (07-31-18 @ 09:02) (120/83 - 131/79)  RR: 15 (07-31-18 @ 09:02) (15 - 18)  SpO2: 97% (07-31-18 @ 09:02) (96% - 97%)  Wt(kg): --    Patient awake, and alert. Eyes open spontaneously. Conversational with appropriate sentences. Refuse exam, short term memory deficit. Seen moving all extremities in bed appropriately.       CBC Full  -  ( 30 Jul 2018 05:38 )  WBC Count : 17.0 K/uL  Hemoglobin : 13.7 g/dL  Hematocrit : 42.2 %  Platelet Count - Automated : 337 K/uL  Mean Cell Volume : 87.2 fL  Mean Cell Hemoglobin : 28.3 pg  Mean Cell Hemoglobin Concentration : 32.5 g/dL  Auto Neutrophil # : x  Auto Lymphocyte # : x  Auto Monocyte # : x  Auto Eosinophil # : x  Auto Basophil # : x  Auto Neutrophil % : x  Auto Lymphocyte % : x  Auto Monocyte % : x  Auto Eosinophil % : x  Auto Basophil % : x    07-30    139  |  102  |  26<H>  ----------------------------<  141<H>  4.3   |  25  |  1.09    Ca    9.0      30 Jul 2018 05:40  Phos  3.2     07-30  Mg     2.1     07-30

## 2018-07-31 NOTE — PROGRESS NOTE ADULT - ASSESSMENT
52 y/o M with GBM s/p b/l craniotomy and tumor resection, admitted with generalized weakness and malaise. Noted by surgical staff to have intermittent RLE twitching during admission; however, pt refusing EEG testing. LEV increased from 1gm BID to 1250mg BID without much improvement in twitching which is likely due to focal motor seizure activity.    Plan:  1. VEEG now, patient agreed overnight.   2. Continue Keppra 1500mg BID  3. Continue Lamictal 25MG BID  4. Monitor Neurological assessments Qshift  5 Maintain Seizure & fall precautions

## 2018-07-31 NOTE — PROGRESS NOTE ADULT - PROVIDER SPECIALTY LIST ADULT
Neurology
Neurology
Neurosurgery
Vascular Surgery
Vascular Surgery
Neurology
Neurosurgery
NSICU
Pulmonology
Internal Medicine
Pulmonology

## 2018-07-31 NOTE — PROGRESS NOTE ADULT - SUBJECTIVE AND OBJECTIVE BOX
HPI:  54y/o M with PMHx sig for anxiety, depression, h/o craniotomy (3/2018 high grade glioma, GBM), h/o seizure presents to ED from home with worsening generalized weakness and difficulty ambulating over the past 2 months. Pt states he has a history of gout and believes he is having a flair-up right now as his elbows and knee joints are very tender to touch. Pt has had his last chemotherapy session last week. Pt is unaware if he was on steroids at home. Denies headaches, acute changes in vision, acute loss of sensation of extremities, facial symmetry, dysarthria, recent seizures, injury/trauma to head, recent seizures. (23 Jul 2018 15:10)    OVERNIGHT EVENTS:  Vital Signs Last 24 Hrs  T(C): 36.4 (31 Jul 2018 05:05), Max: 36.6 (30 Jul 2018 17:15)  T(F): 97.6 (31 Jul 2018 05:05), Max: 97.9 (30 Jul 2018 21:27)  HR: 50 (31 Jul 2018 09:02) (46 - 82)  BP: 130/87 (31 Jul 2018 09:02) (120/83 - 148/77)  BP(mean): 97 (30 Jul 2018 17:41) (97 - 106)  RR: 15 (31 Jul 2018 09:02) (15 - 18)  SpO2: 97% (31 Jul 2018 09:02) (96% - 98%)    I&O's Summary    30 Jul 2018 07:01  -  31 Jul 2018 07:00  --------------------------------------------------------  IN: 100 mL / OUT: 775 mL / NET: -675 mL        PHYSICAL EXAM:  Neurological:    Gen: NAD, AAOx3  HEENT: PERRL. EOMI. Left scalp incision well healed.  Neuro: CNs II-XII intact. 5/5 str x4 extremities. Sensation to LT intact throughout. Following commands. Clear speech    Cardiovascular: RRR  Respiratory: Lungs CTAB  Gastrointestinal: +BS  Genitourinary: Voiding without difficulty  Extremities: warm and dry  Incisio      DIET: Regular    LABS:                        13.7   17.0  )-----------( 337      ( 30 Jul 2018 05:38 )             42.2     07-30    139  |  102  |  26<H>  ----------------------------<  141<H>  4.3   |  25  |  1.09    Ca    9.0      30 Jul 2018 05:40  Phos  3.2     07-30  Mg     2.1     07-30      CAPILLARY BLOOD GLUCOSE      POCT Blood Glucose.: 123 mg/dL (31 Jul 2018 06:42)  POCT Blood Glucose.: 148 mg/dL (30 Jul 2018 22:31)  POCT Blood Glucose.: 127 mg/dL (30 Jul 2018 16:27)  POCT Blood Glucose.: 176 mg/dL (30 Jul 2018 11:39)      Drug Levels: [] N/A    CSF Analysis: [] N/A      Allergies    No Known Allergies    Intolerances      MEDICATIONS:  Antibiotics:    Neuro:  ARIPiprazole 20 milliGRAM(s) Oral daily  FLUoxetine 60 milliGRAM(s) Oral daily  lamoTRIgine 25 milliGRAM(s) Oral two times a day  levETIRAcetam 1500 milliGRAM(s) Oral two times a day  ondansetron Injectable 4 milliGRAM(s) IV Push every 6 hours PRN  zolpidem 5 milliGRAM(s) Oral at bedtime PRN    Anticoagulation:  apixaban 10 milliGRAM(s) Oral every 12 hours    OTHER:  colchicine 0.6 milliGRAM(s) Oral daily  dexamethasone     Tablet 4 milliGRAM(s) Oral every 6 hours  dextrose 40% Gel 15 Gram(s) Oral once PRN  dextrose 50% Injectable 12.5 Gram(s) IV Push once  dextrose 50% Injectable 25 Gram(s) IV Push once  dextrose 50% Injectable 25 Gram(s) IV Push once  docusate sodium 100 milliGRAM(s) Oral three times a day  glucagon  Injectable 1 milliGRAM(s) IntraMuscular once PRN  insulin lispro (HumaLOG) corrective regimen sliding scale   SubCutaneous Before meals and at bedtime  pantoprazole    Tablet 40 milliGRAM(s) Oral before breakfast  senna 2 Tablet(s) Oral at bedtime    IVF:  dextrose 5%. 1000 milliLiter(s) IV Continuous <Continuous>        ASSESSMENT:  53y Male w/ Anxiety, Depression, h/o left craniotomy for GBM resection 03/2018 with RLE intentional tremors,findings of b/l PEs now on Eliquis  PHYSICAL EXAM:  Gen: NAD, AAOx3  HEENT: PERRL. EOMI. Left scalp incision well healed.  Neck: FROM, nontender  Lungs: Clear b/l  Heart: S1, S2. NSR.  Abd: Soft, NT/ND. +BS  Exts: Pulses 2+ throughout  Neuro: CNs II-XII intact. 5/5 str x4 extremities. Sensation to LT intact throughout. Following commands. Clear speech    PLAN:  NEURO:    Monitor neuro status  OT/PT  Continue current medical regime    Dispo: Discussed with attending

## 2018-08-02 ENCOUNTER — APPOINTMENT (OUTPATIENT)
Dept: INFUSION THERAPY | Facility: HOSPITAL | Age: 53
End: 2018-08-02

## 2018-08-04 RX ORDER — APIXABAN 2.5 MG/1
1 TABLET, FILM COATED ORAL
Qty: 60 | Refills: 0 | OUTPATIENT
Start: 2018-08-04 | End: 2018-09-02

## 2018-08-06 ENCOUNTER — INBOUND DOCUMENT (OUTPATIENT)
Age: 53
End: 2018-08-06

## 2018-08-06 DIAGNOSIS — F31.32 BIPOLAR DISORDER, CURRENT EPISODE DEPRESSED, MODERATE: ICD-10-CM

## 2018-08-06 DIAGNOSIS — C71.9 MALIGNANT NEOPLASM OF BRAIN, UNSPECIFIED: ICD-10-CM

## 2018-08-06 DIAGNOSIS — F41.9 ANXIETY DISORDER, UNSPECIFIED: ICD-10-CM

## 2018-08-06 DIAGNOSIS — M10.9 GOUT, UNSPECIFIED: ICD-10-CM

## 2018-08-06 DIAGNOSIS — I26.99 OTHER PULMONARY EMBOLISM WITHOUT ACUTE COR PULMONALE: ICD-10-CM

## 2018-08-06 DIAGNOSIS — I82.411 ACUTE EMBOLISM AND THROMBOSIS OF RIGHT FEMORAL VEIN: ICD-10-CM

## 2018-08-06 DIAGNOSIS — Z53.20 PROCEDURE AND TREATMENT NOT CARRIED OUT BECAUSE OF PATIENT'S DECISION FOR UNSPECIFIED REASONS: ICD-10-CM

## 2018-08-06 DIAGNOSIS — R29.898 OTHER SYMPTOMS AND SIGNS INVOLVING THE MUSCULOSKELETAL SYSTEM: ICD-10-CM

## 2018-08-06 DIAGNOSIS — I82.431 ACUTE EMBOLISM AND THROMBOSIS OF RIGHT POPLITEAL VEIN: ICD-10-CM

## 2018-08-06 DIAGNOSIS — G40.909 EPILEPSY, UNSPECIFIED, NOT INTRACTABLE, WITHOUT STATUS EPILEPTICUS: ICD-10-CM

## 2018-08-06 DIAGNOSIS — R25.1 TREMOR, UNSPECIFIED: ICD-10-CM

## 2018-08-13 ENCOUNTER — APPOINTMENT (OUTPATIENT)
Dept: INFUSION THERAPY | Facility: HOSPITAL | Age: 53
End: 2018-08-13

## 2018-08-13 ENCOUNTER — OUTPATIENT (OUTPATIENT)
Dept: OUTPATIENT SERVICES | Facility: HOSPITAL | Age: 53
LOS: 1 days | End: 2018-08-13
Payer: COMMERCIAL

## 2018-08-13 ENCOUNTER — APPOINTMENT (OUTPATIENT)
Dept: MRI IMAGING | Facility: HOSPITAL | Age: 53
End: 2018-08-13

## 2018-08-13 VITALS
SYSTOLIC BLOOD PRESSURE: 140 MMHG | HEIGHT: 72 IN | HEART RATE: 82 BPM | WEIGHT: 220.02 LBS | OXYGEN SATURATION: 98 % | DIASTOLIC BLOOD PRESSURE: 88 MMHG | RESPIRATION RATE: 16 BRPM | TEMPERATURE: 97 F

## 2018-08-13 DIAGNOSIS — C71.9 MALIGNANT NEOPLASM OF BRAIN, UNSPECIFIED: ICD-10-CM

## 2018-08-13 DIAGNOSIS — Z98.890 OTHER SPECIFIED POSTPROCEDURAL STATES: Chronic | ICD-10-CM

## 2018-08-13 PROCEDURE — 36415 COLL VENOUS BLD VENIPUNCTURE: CPT

## 2018-08-13 PROCEDURE — 81001 URINALYSIS AUTO W/SCOPE: CPT

## 2018-08-13 PROCEDURE — 96413 CHEMO IV INFUSION 1 HR: CPT

## 2018-08-13 PROCEDURE — 80053 COMPREHEN METABOLIC PANEL: CPT

## 2018-08-13 PROCEDURE — 85025 COMPLETE CBC W/AUTO DIFF WBC: CPT

## 2018-08-13 RX ORDER — BEVACIZUMAB 400 MG/16ML
1010 INJECTION, SOLUTION INTRAVENOUS ONCE
Qty: 0 | Refills: 0 | Status: COMPLETED | OUTPATIENT
Start: 2018-08-13 | End: 2018-08-13

## 2018-08-13 RX ADMIN — BEVACIZUMAB 280.8 MILLIGRAM(S): 400 INJECTION, SOLUTION INTRAVENOUS at 16:17

## 2018-08-17 ENCOUNTER — OTHER (OUTPATIENT)
Age: 53
End: 2018-08-17

## 2018-08-20 ENCOUNTER — APPOINTMENT (OUTPATIENT)
Dept: NEUROLOGY | Facility: CLINIC | Age: 53
End: 2018-08-20
Payer: MEDICAID

## 2018-08-20 VITALS
SYSTOLIC BLOOD PRESSURE: 116 MMHG | BODY MASS INDEX: 24.88 KG/M2 | HEIGHT: 78 IN | WEIGHT: 215 LBS | HEART RATE: 112 BPM | OXYGEN SATURATION: 97 % | DIASTOLIC BLOOD PRESSURE: 84 MMHG

## 2018-08-20 PROCEDURE — 99214 OFFICE O/P EST MOD 30 MIN: CPT

## 2018-08-27 ENCOUNTER — OUTPATIENT (OUTPATIENT)
Dept: OUTPATIENT SERVICES | Facility: HOSPITAL | Age: 53
LOS: 1 days | End: 2018-08-27
Payer: COMMERCIAL

## 2018-08-27 ENCOUNTER — APPOINTMENT (OUTPATIENT)
Dept: INFUSION THERAPY | Facility: HOSPITAL | Age: 53
End: 2018-08-27

## 2018-08-27 VITALS
SYSTOLIC BLOOD PRESSURE: 119 MMHG | RESPIRATION RATE: 18 BRPM | WEIGHT: 214.95 LBS | HEART RATE: 85 BPM | TEMPERATURE: 98 F | HEIGHT: 78 IN | DIASTOLIC BLOOD PRESSURE: 85 MMHG | OXYGEN SATURATION: 99 %

## 2018-08-27 DIAGNOSIS — C71.9 MALIGNANT NEOPLASM OF BRAIN, UNSPECIFIED: ICD-10-CM

## 2018-08-27 DIAGNOSIS — Z98.890 OTHER SPECIFIED POSTPROCEDURAL STATES: Chronic | ICD-10-CM

## 2018-08-27 PROCEDURE — 80053 COMPREHEN METABOLIC PANEL: CPT

## 2018-08-27 PROCEDURE — 36415 COLL VENOUS BLD VENIPUNCTURE: CPT

## 2018-08-27 PROCEDURE — 96413 CHEMO IV INFUSION 1 HR: CPT

## 2018-08-27 PROCEDURE — 81001 URINALYSIS AUTO W/SCOPE: CPT

## 2018-08-27 PROCEDURE — 85025 COMPLETE CBC W/AUTO DIFF WBC: CPT

## 2018-08-27 RX ORDER — BEVACIZUMAB 400 MG/16ML
1010 INJECTION, SOLUTION INTRAVENOUS ONCE
Qty: 0 | Refills: 0 | Status: COMPLETED | OUTPATIENT
Start: 2018-08-27 | End: 2018-08-27

## 2018-08-27 RX ADMIN — BEVACIZUMAB 280.8 MILLIGRAM(S): 400 INJECTION, SOLUTION INTRAVENOUS at 15:13

## 2018-08-30 ENCOUNTER — INPATIENT (INPATIENT)
Facility: HOSPITAL | Age: 53
LOS: 8 days | Discharge: HOME HEALTH | DRG: 167 | End: 2018-09-08
Attending: STUDENT IN AN ORGANIZED HEALTH CARE EDUCATION/TRAINING PROGRAM | Admitting: STUDENT IN AN ORGANIZED HEALTH CARE EDUCATION/TRAINING PROGRAM
Payer: COMMERCIAL

## 2018-08-30 VITALS
OXYGEN SATURATION: 96 % | SYSTOLIC BLOOD PRESSURE: 112 MMHG | HEART RATE: 98 BPM | TEMPERATURE: 97 F | RESPIRATION RATE: 16 BRPM | DIASTOLIC BLOOD PRESSURE: 76 MMHG

## 2018-08-30 DIAGNOSIS — Z91.89 OTHER SPECIFIED PERSONAL RISK FACTORS, NOT ELSEWHERE CLASSIFIED: ICD-10-CM

## 2018-08-30 DIAGNOSIS — I26.92 SADDLE EMBOLUS OF PULMONARY ARTERY WITHOUT ACUTE COR PULMONALE: ICD-10-CM

## 2018-08-30 DIAGNOSIS — I82.409 ACUTE EMBOLISM AND THROMBOSIS OF UNSPECIFIED DEEP VEINS OF UNSPECIFIED LOWER EXTREMITY: ICD-10-CM

## 2018-08-30 DIAGNOSIS — C71.9 MALIGNANT NEOPLASM OF BRAIN, UNSPECIFIED: ICD-10-CM

## 2018-08-30 DIAGNOSIS — Z29.9 ENCOUNTER FOR PROPHYLACTIC MEASURES, UNSPECIFIED: ICD-10-CM

## 2018-08-30 DIAGNOSIS — M10.9 GOUT, UNSPECIFIED: ICD-10-CM

## 2018-08-30 DIAGNOSIS — R63.8 OTHER SYMPTOMS AND SIGNS CONCERNING FOOD AND FLUID INTAKE: ICD-10-CM

## 2018-08-30 DIAGNOSIS — I26.99 OTHER PULMONARY EMBOLISM WITHOUT ACUTE COR PULMONALE: ICD-10-CM

## 2018-08-30 DIAGNOSIS — R56.9 UNSPECIFIED CONVULSIONS: ICD-10-CM

## 2018-08-30 DIAGNOSIS — Z98.890 OTHER SPECIFIED POSTPROCEDURAL STATES: Chronic | ICD-10-CM

## 2018-08-30 LAB
ALBUMIN SERPL ELPH-MCNC: 4 G/DL — SIGNIFICANT CHANGE UP (ref 3.3–5)
ALP SERPL-CCNC: 67 U/L — SIGNIFICANT CHANGE UP (ref 40–120)
ALT FLD-CCNC: 11 U/L — SIGNIFICANT CHANGE UP (ref 10–45)
ANION GAP SERPL CALC-SCNC: 13 MMOL/L — SIGNIFICANT CHANGE UP (ref 5–17)
APTT BLD: 200 SEC — CRITICAL HIGH (ref 27.5–37.4)
APTT BLD: 29.3 SEC — SIGNIFICANT CHANGE UP (ref 27.5–37.4)
AST SERPL-CCNC: 17 U/L — SIGNIFICANT CHANGE UP (ref 10–40)
BASOPHILS NFR BLD AUTO: 0.3 % — SIGNIFICANT CHANGE UP (ref 0–2)
BILIRUB SERPL-MCNC: 0.4 MG/DL — SIGNIFICANT CHANGE UP (ref 0.2–1.2)
BLD GP AB SCN SERPL QL: NEGATIVE — SIGNIFICANT CHANGE UP
BUN SERPL-MCNC: 11 MG/DL — SIGNIFICANT CHANGE UP (ref 7–23)
CALCIUM SERPL-MCNC: 9.7 MG/DL — SIGNIFICANT CHANGE UP (ref 8.4–10.5)
CHLORIDE SERPL-SCNC: 100 MMOL/L — SIGNIFICANT CHANGE UP (ref 96–108)
CO2 SERPL-SCNC: 28 MMOL/L — SIGNIFICANT CHANGE UP (ref 22–31)
CREAT SERPL-MCNC: 1.45 MG/DL — HIGH (ref 0.5–1.3)
EOSINOPHIL NFR BLD AUTO: 0.8 % — SIGNIFICANT CHANGE UP (ref 0–6)
GLUCOSE SERPL-MCNC: 147 MG/DL — HIGH (ref 70–99)
HCT VFR BLD CALC: 43.3 % — SIGNIFICANT CHANGE UP (ref 39–50)
HGB BLD-MCNC: 14.3 G/DL — SIGNIFICANT CHANGE UP (ref 13–17)
INR BLD: 1.1 — SIGNIFICANT CHANGE UP (ref 0.88–1.16)
LIDOCAIN IGE QN: 24 U/L — SIGNIFICANT CHANGE UP (ref 7–60)
LYMPHOCYTES # BLD AUTO: 12.7 % — LOW (ref 13–44)
MCHC RBC-ENTMCNC: 29.3 PG — SIGNIFICANT CHANGE UP (ref 27–34)
MCHC RBC-ENTMCNC: 33 G/DL — SIGNIFICANT CHANGE UP (ref 32–36)
MCV RBC AUTO: 88.7 FL — SIGNIFICANT CHANGE UP (ref 80–100)
MONOCYTES NFR BLD AUTO: 12.2 % — SIGNIFICANT CHANGE UP (ref 2–14)
NEUTROPHILS NFR BLD AUTO: 74 % — SIGNIFICANT CHANGE UP (ref 43–77)
PLATELET # BLD AUTO: 233 K/UL — SIGNIFICANT CHANGE UP (ref 150–400)
POTASSIUM SERPL-MCNC: 4 MMOL/L — SIGNIFICANT CHANGE UP (ref 3.5–5.3)
POTASSIUM SERPL-SCNC: 4 MMOL/L — SIGNIFICANT CHANGE UP (ref 3.5–5.3)
PROT SERPL-MCNC: 7.2 G/DL — SIGNIFICANT CHANGE UP (ref 6–8.3)
PROTHROM AB SERPL-ACNC: 12.2 SEC — SIGNIFICANT CHANGE UP (ref 9.8–12.7)
RBC # BLD: 4.88 M/UL — SIGNIFICANT CHANGE UP (ref 4.2–5.8)
RBC # FLD: 13.4 % — SIGNIFICANT CHANGE UP (ref 10.3–16.9)
RH IG SCN BLD-IMP: NEGATIVE — SIGNIFICANT CHANGE UP
SODIUM SERPL-SCNC: 141 MMOL/L — SIGNIFICANT CHANGE UP (ref 135–145)
WBC # BLD: 9.2 K/UL — SIGNIFICANT CHANGE UP (ref 3.8–10.5)
WBC # FLD AUTO: 9.2 K/UL — SIGNIFICANT CHANGE UP (ref 3.8–10.5)

## 2018-08-30 PROCEDURE — 71275 CT ANGIOGRAPHY CHEST: CPT | Mod: 26

## 2018-08-30 PROCEDURE — 93306 TTE W/DOPPLER COMPLETE: CPT | Mod: 26

## 2018-08-30 PROCEDURE — 99222 1ST HOSP IP/OBS MODERATE 55: CPT

## 2018-08-30 PROCEDURE — 70450 CT HEAD/BRAIN W/O DYE: CPT | Mod: 26

## 2018-08-30 PROCEDURE — 99285 EMERGENCY DEPT VISIT HI MDM: CPT | Mod: 25

## 2018-08-30 PROCEDURE — 93010 ELECTROCARDIOGRAM REPORT: CPT

## 2018-08-30 RX ORDER — SODIUM CHLORIDE 9 MG/ML
1000 INJECTION INTRAMUSCULAR; INTRAVENOUS; SUBCUTANEOUS ONCE
Qty: 0 | Refills: 0 | Status: COMPLETED | OUTPATIENT
Start: 2018-08-30 | End: 2018-08-30

## 2018-08-30 RX ORDER — LEVETIRACETAM 250 MG/1
1000 TABLET, FILM COATED ORAL
Qty: 0 | Refills: 0 | Status: DISCONTINUED | OUTPATIENT
Start: 2018-08-30 | End: 2018-09-08

## 2018-08-30 RX ORDER — LAMOTRIGINE 25 MG/1
25 TABLET, ORALLY DISINTEGRATING ORAL
Qty: 0 | Refills: 0 | Status: DISCONTINUED | OUTPATIENT
Start: 2018-08-30 | End: 2018-09-08

## 2018-08-30 RX ORDER — HEPARIN SODIUM 5000 [USP'U]/ML
INJECTION INTRAVENOUS; SUBCUTANEOUS
Qty: 25000 | Refills: 0 | Status: DISCONTINUED | OUTPATIENT
Start: 2018-08-30 | End: 2018-08-30

## 2018-08-30 RX ORDER — RISPERIDONE 4 MG/1
0.5 TABLET ORAL
Qty: 0 | Refills: 0 | Status: DISCONTINUED | OUTPATIENT
Start: 2018-08-30 | End: 2018-09-08

## 2018-08-30 RX ORDER — ARIPIPRAZOLE 15 MG/1
15 TABLET ORAL DAILY
Qty: 0 | Refills: 0 | Status: DISCONTINUED | OUTPATIENT
Start: 2018-08-30 | End: 2018-09-08

## 2018-08-30 RX ORDER — FLUOXETINE HCL 10 MG
20 CAPSULE ORAL DAILY
Qty: 0 | Refills: 0 | Status: DISCONTINUED | OUTPATIENT
Start: 2018-08-30 | End: 2018-09-03

## 2018-08-30 RX ORDER — HYDROMORPHONE HYDROCHLORIDE 2 MG/ML
0.5 INJECTION INTRAMUSCULAR; INTRAVENOUS; SUBCUTANEOUS EVERY 4 HOURS
Qty: 0 | Refills: 0 | Status: DISCONTINUED | OUTPATIENT
Start: 2018-08-30 | End: 2018-09-04

## 2018-08-30 RX ORDER — PANTOPRAZOLE SODIUM 20 MG/1
40 TABLET, DELAYED RELEASE ORAL
Qty: 0 | Refills: 0 | Status: DISCONTINUED | OUTPATIENT
Start: 2018-08-30 | End: 2018-09-08

## 2018-08-30 RX ORDER — HEPARIN SODIUM 5000 [USP'U]/ML
8000 INJECTION INTRAVENOUS; SUBCUTANEOUS ONCE
Qty: 0 | Refills: 0 | Status: COMPLETED | OUTPATIENT
Start: 2018-08-30 | End: 2018-08-30

## 2018-08-30 RX ORDER — MORPHINE SULFATE 50 MG/1
6 CAPSULE, EXTENDED RELEASE ORAL ONCE
Qty: 0 | Refills: 0 | Status: DISCONTINUED | OUTPATIENT
Start: 2018-08-30 | End: 2018-08-30

## 2018-08-30 RX ORDER — ACETAMINOPHEN 500 MG
650 TABLET ORAL EVERY 6 HOURS
Qty: 0 | Refills: 0 | Status: DISCONTINUED | OUTPATIENT
Start: 2018-08-30 | End: 2018-09-08

## 2018-08-30 RX ORDER — DOCUSATE SODIUM 100 MG
100 CAPSULE ORAL THREE TIMES A DAY
Qty: 0 | Refills: 0 | Status: DISCONTINUED | OUTPATIENT
Start: 2018-08-30 | End: 2018-09-08

## 2018-08-30 RX ORDER — COLCHICINE 0.6 MG
0.6 TABLET ORAL DAILY
Qty: 0 | Refills: 0 | Status: DISCONTINUED | OUTPATIENT
Start: 2018-08-30 | End: 2018-09-08

## 2018-08-30 RX ORDER — SENNA PLUS 8.6 MG/1
1 TABLET ORAL AT BEDTIME
Qty: 0 | Refills: 0 | Status: DISCONTINUED | OUTPATIENT
Start: 2018-08-30 | End: 2018-09-03

## 2018-08-30 RX ORDER — HYDROMORPHONE HYDROCHLORIDE 2 MG/ML
0.5 INJECTION INTRAMUSCULAR; INTRAVENOUS; SUBCUTANEOUS EVERY 6 HOURS
Qty: 0 | Refills: 0 | Status: DISCONTINUED | OUTPATIENT
Start: 2018-08-30 | End: 2018-08-30

## 2018-08-30 RX ADMIN — HEPARIN SODIUM 1800 UNIT(S)/HR: 5000 INJECTION INTRAVENOUS; SUBCUTANEOUS at 17:02

## 2018-08-30 RX ADMIN — LEVETIRACETAM 1000 MILLIGRAM(S): 250 TABLET, FILM COATED ORAL at 23:11

## 2018-08-30 RX ADMIN — RISPERIDONE 0.5 MILLIGRAM(S): 4 TABLET ORAL at 23:12

## 2018-08-30 RX ADMIN — MORPHINE SULFATE 6 MILLIGRAM(S): 50 CAPSULE, EXTENDED RELEASE ORAL at 13:33

## 2018-08-30 RX ADMIN — HYDROMORPHONE HYDROCHLORIDE 0.5 MILLIGRAM(S): 2 INJECTION INTRAMUSCULAR; INTRAVENOUS; SUBCUTANEOUS at 23:59

## 2018-08-30 RX ADMIN — HYDROMORPHONE HYDROCHLORIDE 0.5 MILLIGRAM(S): 2 INJECTION INTRAMUSCULAR; INTRAVENOUS; SUBCUTANEOUS at 23:35

## 2018-08-30 RX ADMIN — MORPHINE SULFATE 6 MILLIGRAM(S): 50 CAPSULE, EXTENDED RELEASE ORAL at 15:00

## 2018-08-30 RX ADMIN — SODIUM CHLORIDE 1000 MILLILITER(S): 9 INJECTION INTRAMUSCULAR; INTRAVENOUS; SUBCUTANEOUS at 13:44

## 2018-08-30 RX ADMIN — HEPARIN SODIUM 8000 UNIT(S): 5000 INJECTION INTRAVENOUS; SUBCUTANEOUS at 17:02

## 2018-08-30 RX ADMIN — SENNA PLUS 1 TABLET(S): 8.6 TABLET ORAL at 23:11

## 2018-08-30 RX ADMIN — Medication 100 MILLIGRAM(S): at 23:11

## 2018-08-30 NOTE — H&P ADULT - ATTENDING COMMENTS
Pt seen and examined.  Agree with resident assessment and plan with following addendum.    52 yo M with active GBM on radiation therapy and avastin, recent DVT/PE dx one mo ago non compliant with eliquis presents with chest pain found to have large saddle PE    # Pulmonary Embolism  - saddle PE with minimal evidence of right heart strain on CT,  ECHO unremarkable, no pulm htn (no TR), negative cardiac biomarkers, clinically stable, no oxygen requirements  - NOT failure of therapy.  Patient was not taking eliquis for a few weeks (timing unclear). - Hypercoagulable state due to active malignancy combined with decreased mobility put patient at very high risk for recurrent VTE.    - currently on heparin gtt, CT head with no bleeding, cleared by neurosurgery for anticoagulation. Monitor PTT's closely.   - seen by PERT team, no plan for intervention at this point  - LE dopplers pending to assess clot burden.  Extensive proximal right DVT in July 2018.   - assess symptoms/vitals/oxygenation, if clinically deteriorating call PERT team for possible intervention.    - discuss LMWH going forward for long term therapy as better efficacy in patients with active malignancy.

## 2018-08-30 NOTE — H&P ADULT - NSHPPHYSICALEXAM_GEN_ALL_CORE
.  VITAL SIGNS:  T(C): 36.7 (08-30-18 @ 16:00), Max: 36.7 (08-30-18 @ 16:00)  T(F): 98.1 (08-30-18 @ 16:00), Max: 98.1 (08-30-18 @ 16:00)  HR: 78 (08-30-18 @ 16:00) (78 - 98)  BP: 131/94 (08-30-18 @ 16:00) (112/76 - 131/94)  BP(mean): --  RR: 16 (08-30-18 @ 16:00) (16 - 16)  SpO2: 96% (08-30-18 @ 16:00) (96% - 96%)  Wt(kg): --    PHYSICAL EXAM:    General: NAD, comfortable, pleasant demeanor, speaking in full sentences  HEENT: NCAT, PERRL, clear conjunctiva, no scleral icterus  Neck: supple, no JVD  Respiratory: CTA b/l, no wheezing, rhonchi, rales  Cardiovascular: RRR, normal S1S2, no M/R/G  Abdomen: soft, NT/ND, bowel sounds in all four quadrants  Extremities: WWP, RLE swelling, R calf size > L calf  Neuro: AOx3 .  VITAL SIGNS:  T(C): 36.7 (08-30-18 @ 16:00), Max: 36.7 (08-30-18 @ 16:00)  T(F): 98.1 (08-30-18 @ 16:00), Max: 98.1 (08-30-18 @ 16:00)  HR: 78 (08-30-18 @ 16:00) (78 - 98)  BP: 131/94 (08-30-18 @ 16:00) (112/76 - 131/94)  BP(mean): --  RR: 16 (08-30-18 @ 16:00) (16 - 16)  SpO2: 96% (08-30-18 @ 16:00) (96% - 96%)  Wt(kg): --    PHYSICAL EXAM:    General: NAD, comfortable, pleasant demeanor, speaking in full sentences  HEENT: NCAT, PERRL, EOMI, visual fields full, clear conjunctiva, no scleral icterus  Neck: supple, no JVD  Respiratory: CTA b/l, no wheezing, rhonchi, rales  Cardiovascular: RRR, normal S1S2, no M/R/G  Abdomen: soft, NT/ND, bowel sounds in all four quadrants  Extremities: WWP, RLE swelling, R calf size > L calf  Neuro: AOx3, speech clear and fluent, face symmetrical, tongue protrudes midline, motor and sensory intact, gait deferred

## 2018-08-30 NOTE — H&P ADULT - NSHPOUTPATIENTPROVIDERS_GEN_ALL_CORE
Dr. Mason - Neurosurgery [phone]  Dr. Mikala Miguel - Neuro Onc [phone]  Dr. Vidya Giraldo Pulm

## 2018-08-30 NOTE — H&P ADULT - PROBLEM SELECTOR PLAN 7
1) PCP Contacted on Admission: no PMD. Under care of Dr. Mason - Neurosurgery [phone] and Dr. Mikala Miguel - Neuro Onc [phone]  2) Date of Contact with PCP: NA  3) PCP Contacted at Discharge: TBD  4) Summary of Handoff Given to PCP: TBD  5) Post-Discharge Appointment Date and Location: TBD VTE ppx: on heparin gtt.    Code: FULL CODE.

## 2018-08-30 NOTE — H&P ADULT - PROBLEM SELECTOR PLAN 5
VTE ppx: on heparin gtt.     Code: FULL CODE. F: no indication for IVF at this time.  E: replete lytes prn.  N: regular diet. History of gout. Currently symptom free.   - Continue on home colchicine.

## 2018-08-30 NOTE — PROGRESS NOTE ADULT - ASSESSMENT
54 yo M with hx of GBM receiving chemotherapy and recent DVT/PE not on a/c for the last 2 weeks presents with left sided upper back and chest pain associated with saddle embolism on CTA

## 2018-08-30 NOTE — H&P ADULT - PROBLEM SELECTOR PROBLEM 6
Transition of care performed with sharing of clinical summary Need for prophylactic measure Nutrition, metabolism, and development symptoms

## 2018-08-30 NOTE — H&P ADULT - ASSESSMENT
52 y/o M with PMHx GBM (dx 2017, mass resections x 2, chemo, radiation), PE, gout, seizure d/o, depression, anxiety, presents for dizziness, left sided neck/back pain, and substernal chest pain. He describes left sided back pain that woke him up early this morning. Admitted to [facility] for saddle embolus on CTA with mild RV strain on TTE, likely 2/2 GBM. Currently HD stable on heparin gtt.

## 2018-08-30 NOTE — H&P ADULT - PROBLEM SELECTOR PLAN 1
CTA chest showed new saddle pulmonary embolism in the main pulmonary artery that continues to right and left lobar arteries. Revisualization of chronic pulmonary emboli. Possible minimal right heart strain. There are a few new small wedge-shaped peripheral opacities in the left lower lobe which may represent evolving pulmonary infarcts versus atelectasis. Pt d/c home on [employer] in 7/2018 after refusing ALYSSA. He has had home services but says his home attendant does not speak english and he is overwhelmed with his medications. He ran out of the [employer] two weeks ago and did not get a refill. Currently with mild substernal CP at rest, no SOB or hemoptysis. VS no tachycardia or hypotension. Trops and BNP neg. TTE with mild RV strain. CT head negative for bleed, cleared by Neurosurgery and started on heparin gtt.  - Continue heparin gtt and check serial PTT.  - Pulm following. Appreciate recs. CTA chest showed new saddle pulmonary embolism in the main pulmonary artery that continues to right and left lobar arteries. Revisualization of chronic pulmonary emboli. Possible minimal right heart strain. There are a few new small wedge-shaped peripheral opacities in the left lower lobe which may represent evolving pulmonary infarcts versus atelectasis. Etiology likely secondary to malignancy and medication non-adherance. Currently with mild substernal CP at rest, no SOB or hemoptysis. VS no tachycardia or hypotension. Trops and BNP neg. TTE with mild RV strain. PESI score 95 (intermediate risk: 3.2-7.1% 30 day mortality). CT head negative for bleed, cleared by Neurosurgery and started on heparin gtt.  - Continue heparin gtt and check serial PTT.  - Pulm following. Appreciate recs.

## 2018-08-30 NOTE — ED PROVIDER NOTE - OBJECTIVE STATEMENT
Pt w/ PMHx GBM s/p craniectomy x 2 and chemo (last 1 month ago), PE (pt does not think he is taking Eliquis at this time), gout, seizure d/o, depression, anxiety, p/w L back pain, awakening him from sleep at 5 am this morning. The pain begins in the shoulder blade region and radiates to the flank. The pain is constant, sharp, worse w/ movement and deep breathing. Pt has taken Excedrin w/o relief. Pt w/ PMHx GBM s/p craniectomy x 2 and chemo (last 1 month ago), PE (pt does not think he is taking Eliquis at this time), gout, seizure d/o, depression, anxiety, p/w L back pain, awakening him from sleep at 5 am this morning. The pain begins in the shoulder blade region and radiates to the flank. The pain is constant, sharp, worse w/ movement and deep breathing. Pt has taken Excedrin w/o relief. Pt also reports pain in the L lower chest. No abd pain. + nausea. No V/D. + chronic constipation, unchanged. No f/c. No cough. Pt reports mild SOB. No LE edema or calf pain. Pt was admitted in July, found to have multiple PE's at that time. Pt was discharged on Eliquis. Pt thinks he was told to stop taking it, but is unsure, and is unsure of PE dx.

## 2018-08-30 NOTE — H&P ADULT - PROBLEM SELECTOR PLAN 4
F: no indication for IVF at this time.  E: replete lytes prn.  N: regular diet. History of seizures secondary to GBM.  - Continue on home medications History of seizures secondary to GBM. As per   - Continue on home medications History of seizures secondary to GBM.   - Continue on home medications

## 2018-08-30 NOTE — PROGRESS NOTE ADULT - SUBJECTIVE AND OBJECTIVE BOX
Called regarding patient anticoagulation.  CT head performed, reviewed with attending radiologist.  No hemorrhage at this time.  Okay for anticoagulation in setting of saddle pulmonary embolism.  Patient was discharged on eliquis for PEs in past.

## 2018-08-30 NOTE — H&P ADULT - NSHPLABSRESULTS_GEN_ALL_CORE
.  LABS:                         14.3   9.2   )-----------( 233      ( 30 Aug 2018 12:40 )             43.3     08-30    141  |  100  |  11  ----------------------------<  147<H>  4.0   |  28  |  1.45<H>    Ca    9.7      30 Aug 2018 12:40    TPro  7.2  /  Alb  4.0  /  TBili  0.4  /  DBili  x   /  AST  17  /  ALT  11  /  AlkPhos  67  08-30    PT/INR - ( 30 Aug 2018 12:40 )   PT: 12.2 sec;   INR: 1.10          PTT - ( 30 Aug 2018 12:40 )  PTT:29.3 sec    CARDIAC MARKERS ( 30 Aug 2018 12:40 )  x     / <0.01 ng/mL / 99 U/L / x     / 1.5 ng/mL      Serum Pro-Brain Natriuretic Peptide: 191 pg/mL (08-30 @ 12:40)        RADIOLOGY, EKG & ADDITIONAL TESTS: Reviewed.

## 2018-08-30 NOTE — H&P ADULT - PROBLEM SELECTOR PLAN 3
History of seizures secondary to GBM. History of seizures secondary to GBM.  - Continue on home medications Patient last saw his neuro oncologist Dr. Mikala Miguel on 8/20/18. As per her note, the GBM was initially diagnosed in 2017 with onset of GTC seizures. A right sided parietal mass resection was done at [place] by Dr. Tucker Lai and patient was treated with chemoradiation and Temodar x 2. After that a follow up MRI showed new area of enhancement and patient went for a second mass resection. Then he underwent RT with Avastin x 7 (last dose last week).   - Neurosurgery saw patient in ED. Cleared to start on heparin gtt.  - Will follow recommendations. Patient last saw his neuro oncologist Dr. Mikala Miguel on 8/20/18. As per her note, the GBM was initially diagnosed in 2017 with onset of GTC seizures. A right sided parietal mass was resected at [place] by Dr. Tucker Lai. Path showed GBM (IDH wt, MGMT methylated, EGFR amplified. He was treated by Dr. Cho at [place] with STUPP protocol - had 2 cycle sof adjuvant TMZ and was found to have radiographic progression on MRI - with a new lesion in the left parietal lobe - this was resected by Dr. Mason on 3/13/18 - pathology was GBM, IDH WT, MGMT unmethylated, and EGFR amplified. He received RT with IV Avastin - he has had a total of 7 infusions.  - Neurosurgery saw patient in ED. Cleared to start on heparin gtt.  - Will follow recommendations.  - Low threshold to scan head if there is a change in mental status.

## 2018-08-30 NOTE — ED ADULT NURSE REASSESSMENT NOTE - NS ED NURSE REASSESS COMMENT FT1
Pharmacy at bedside with 2 RNs Sima RN and Aga RN to verify rate and dose of heparin gtt unable document

## 2018-08-30 NOTE — H&P ADULT - PROBLEM SELECTOR PLAN 8
1) PCP Contacted on Admission: no PMD. Under care of Dr. Mason - Neurosurgery [phone] and Dr. Mikala Miguel - Neuro Onc [phone]  2) Date of Contact with PCP: NA  3) PCP Contacted at Discharge: TBD  4) Summary of Handoff Given to PCP: TBD  5) Post-Discharge Appointment Date and Location: TBD

## 2018-08-30 NOTE — ED ADULT TRIAGE NOTE - CHIEF COMPLAINT QUOTE
Pt w hx of Glioblastoma and craniotomy presents c/o L sided back, rib and flank pain since yesterday, denies chest pain or dysuria or nausea. Endorses weakness. Pt on chemo, last session - appr a month ago.

## 2018-08-30 NOTE — H&P ADULT - HISTORY OF PRESENT ILLNESS
52 y/o M with PMHx GBM (dx 2017, mass resections x 2, chemo, radiation), PE, gout, seizure d/o, depression, anxiety, presents for dizziness, left sided neck/back pain, and substernal chest pain. He describes left sided back pain that woke him up early this morning. He describes the pain as sharp, constant, and worse with deep breaths. He has been feeling more dizzy while ambulating, but denies syncope. He felt the pain radiate to the substernal chest described as pressure. Denies fever, chills, night sweats, palpitations, cough, hemoptysis, N/V/D, calf pain, leg swelling. Last month he was diagnosed with PE and DVT. CTA on 7/27/2018 showed bilateral pulmonary emboli in the left lower lobar artery continuing to multiple segmental arteries and right lower lobe subsegmental arteries. No evidence of right heart strain. Dopplers showed extensive deep vein thrombosis throughout the veins of the right thigh. As per Pulm note, the patient was discharged home on Eliquis at that time after refusing ALYSSA. He has had home services but says his home attendant does not speak english and he is overwhelmed with his medications. He ran out of the Eliquis two weeks ago and did not get a refill.    Patient last saw his neuro oncologist Dr. Mikala Miguel on 8/20/18. As per her note, the GBM was initially diagnosed in 2017 with onset of GTC seizures. A right sided parietal mass resection was done at [place] by Dr. Tucker Lai and patient was treated with chemoradiation and Temodar x 2. After that a follow up MRI showed new area of enhancement and patient went for a second mass resection. Then he underwent RT with Avastin x 7 (last dose last week).     In the ED, T 97.1, HR 98, /76, RR 16, O2 96 on RA. Labs unremarkable except for Cr 1.45, baseline 1.26. Trops neg. . CTA chest showed new saddle pulmonary embolism in the main pulmonary artery that continues to right and left lobar arteries. Revisualization of chronic pulmonary emboli. Possible minimal right heart strain. There are a few new small wedge-shaped peripheral opacities in the left lower lobe which may represent evolving pulmonary infarcts versus atelectasis. CT head showed no definite intracranial hemorrhage. Patient status post right frontoparietal and left parietal craniotomy with residual tumoral disease and/or post therapeutic changes as described above. 52 y/o M with PMHx of multifocal GBM (dx 2017, mass resections x 2, chemo, radiation), PE, gout, seizure d/o, depression, anxiety, presents for dizziness, left sided neck/back pain, and substernal chest pain. He describes left sided back pain that woke him up early this morning. He describes the pain as sharp, constant, and worse with deep breaths. He has been feeling more dizzy while ambulating, but denies syncope. He felt the pain radiate to the substernal chest described as pressure. Denies fever, chills, night sweats, palpitations, cough, hemoptysis, N/V/D, calf pain, leg swelling. Last month he was diagnosed with PE and DVT. CTA on 7/27/2018 showed bilateral pulmonary emboli in the left lower lobar artery continuing to multiple segmental arteries and right lower lobe subsegmental arteries. No evidence of right heart strain. Dopplers showed extensive deep vein thrombosis throughout the veins of the right thigh. As per Pulm note, the patient was discharged home on Eliquis at that time after refusing ALYSSA. He has had home services but says his home attendant does not speak english and he is overwhelmed with his medications. He ran out of the Eliquis two weeks ago and did not get a refill.    Patient last saw his neuro oncologist Dr. Mikala Miguel on 8/20/18. As per her note, the GBM was initially diagnosed in 2017 with onset of GTC seizures. A right sided parietal mass resection was done at [place] by Dr. Tucker Lai and patient was treated with chemoradiation and Temodar x 2. After that a follow up MRI showed new area of enhancement and patient went for a second mass resection. Then he underwent RT with Avastin x 7 (last dose last week).     In the ED, T 97.1, HR 98, /76, RR 16, O2 96 on RA. Labs unremarkable except for Cr 1.45, baseline 1.26. Trops neg. . CTA chest showed new saddle pulmonary embolism in the main pulmonary artery that continues to right and left lobar arteries. Revisualization of chronic pulmonary emboli. Possible minimal right heart strain. There are a few new small wedge-shaped peripheral opacities in the left lower lobe which may represent evolving pulmonary infarcts versus atelectasis. CT head showed no definite intracranial hemorrhage. Patient status post right frontoparietal and left parietal craniotomy with residual tumoral disease and/or post therapeutic changes as described above. 52 y/o M with PMHx of multifocal GBM (dx 2017, mass resections x 2, chemo, radiation), PE, gout, seizure d/o, depression, anxiety, presents for dizziness, left sided neck/back pain, and substernal chest pain. He describes left sided back pain that woke him up early this morning. He describes the pain as sharp, constant, and worse with deep breaths. He has been feeling more dizzy while ambulating, but denies syncope. He felt the pain radiate to the substernal chest described as pressure. Denies fever, chills, night sweats, palpitations, cough, hemoptysis, N/V/D, calf pain, leg swelling. Last month he was diagnosed with PE and DVT. CTA on 7/27/2018 showed bilateral pulmonary emboli in the left lower lobar artery continuing to multiple segmental arteries and right lower lobe subsegmental arteries. No evidence of right heart strain. Dopplers showed extensive deep vein thrombosis throughout the veins of the right thigh. As per Pulm note, the patient was discharged home on Eliquis at that time after refusing ALYSSA. He has had home services but says his home attendant does not speak english and he is overwhelmed with his medications. He ran out of the Eliquis two weeks ago and did not get a refill.    Patient last saw his neuro oncologist Dr. Mikala Miguel on 8/20/18. As per her note, the GBM was initially diagnosed in 2017 with onset of GTC seizures. A right sided parietal mass was resected at [place] by Dr. Tucker Lai. Path showed GBM (IDH wt, MGMT methylated, EGFR amplified. He was treated by Dr. Cho at [place] with STUPP protocol - had 2 cycle sof adjuvant TMZ and was found to have radiographic progression on MRI - with a new lesion in the left parietal lobe - this was resected by Dr. Mason on 3/13/18 - pathology was GBM, IDH WT, MGMT unmethylated, and EGFR amplified. He received RT with IV Avastin - he has had a total of 7 infusions.    In the ED, T 97.1, HR 98, /76, RR 16, O2 96 on RA. Labs unremarkable except for Cr 1.45, baseline 1.26. Trops neg. . CTA chest showed new saddle pulmonary embolism in the main pulmonary artery that continues to right and left lobar arteries. Revisualization of chronic pulmonary emboli. Possible minimal right heart strain. There are a few new small wedge-shaped peripheral opacities in the left lower lobe which may represent evolving pulmonary infarcts versus atelectasis. CT head showed no definite intracranial hemorrhage. Patient status post right frontoparietal and left parietal craniotomy with residual tumoral disease and/or post therapeutic changes as described above.

## 2018-08-30 NOTE — ED PROVIDER NOTE - PROGRESS NOTE DETAILS
Verbal report from radiology - + saddle embolus w/ mild right heart strain. Dr Garrison called and left message to call back reading this team for PERT team evaluation. Pulm fellow also paged. Given hx GBM w/ crani x 2, will get CT brain and d/w neuro / NRS regarding AC risks. Unclear if pt advised to stop. D/w NRS - if pt discharged on Eliquis s/p surgery, there should be reason not to AC at this time. Recommends repeat CT and they will advise afterwards. Pulm / RADHA Jj at beside evaluating the pt D/w Dr Miguel - she did not stop AC. As long as there is no hemorrhage, pt may be anticoagulated A/p NRS - safe to AC. Dr Jj d/w pulm attending - admit to F. No intervention at this time. Pt may need IVC filter. Requesting b/l LE doppler

## 2018-08-30 NOTE — ED ADULT NURSE NOTE - NSIMPLEMENTINTERV_GEN_ALL_ED
Implemented All Fall with Harm Risk Interventions:  [place] to call system. Call bell, personal items and telephone within reach. Instruct patient to call for assistance. Room bathroom lighting operational. Non-slip footwear when patient is off stretcher. Physically safe environment: no spills, clutter or unnecessary equipment. Stretcher in lowest position, wheels locked, appropriate side rails in place. Provide visual cue, wrist band, yellow gown, etc. Monitor gait and stability. Monitor for mental status changes and reorient to person, place, and time. Review medications for side effects contributing to fall risk. Reinforce activity limits and safety measures with patient and family. Provide visual clues: red socks.

## 2018-08-30 NOTE — H&P ADULT - PROBLEM SELECTOR PLAN 2
Patient last saw his neuro oncologist Dr. Mikala Miguel on 8/20/18. As per her note, the GBM was initially diagnosed in 2017 with onset of GTC seizures. A right sided parietal mass resection was done at [place] by Dr. Tucker Lai and patient was treated with chemoradiation and Temodar x 2. After that a follow up MRI showed new area of enhancement and patient went for a second mass resection. Then he underwent RT with Avastin x 7 (last dose last week).   - Neurosurgery saw patient in ED. Cleared to start on heparin gtt.  - Will follow recommendations. LE Dopplers in 7/2018 showed extensive deep vein thrombosis throughout the veins of the right thigh. Patient was started on Eliquis, but has not been taking it over the last two weeks.  - Check LE dopplers for increased clot burden.  - LE Dopplers in 7/2018 showed extensive deep vein thrombosis throughout the veins of the right thigh. Patient was started on Eliquis, but has not been taking it over the last two weeks.  - Check LE dopplers for increased clot burden.  - If increased, consider Vascular consult for workup. Consider IVC filter.

## 2018-08-30 NOTE — PROGRESS NOTE ADULT - PROBLEM SELECTOR PLAN 1
- large clot burden with saddle embolism, likely acute on chronic given pt with no increased oxygen requirements, no tachycardia, no tachypnea, normal troponin, EKG with NSR  - Appears to be provoked 2/2 malignancy and noncompliance with oral a/c  - Having increasing difficulty with medication management at home despite home health aid  - F/U BNP and echo to assess for right heart strain  - Agree with CT head to confirm no bleed (no focal deficits on exam, but pt admits to being more confused at times). If negative and echo without significant strain, would start heparin gtt and observe x 24 hours, walk patient with PT tomorrow to assess for tachycardia or hypoxia  - If pt symptomatic with minimal exertion, desaturating or tachycardic with walking, or has evidence of right heart strain on echo, would consider catheter directed tPA.  - Would also repeat US of RLE to assess clot burden in leg. While controversial, in this patient who has a saddle embolism and medication noncompliance as well as intracranial mass that may increase risk of bleeding, if pt with large RLE DVT clot would consider discussion with vascular re: IVC filter  - Pt needs placement vs. improved help at home - large clot burden with saddle embolism, likely acute on chronic given pt with no increased oxygen requirements, no tachycardia, no tachypnea, normal troponin, EKG with NSR. PESI score 95 (intermediate risk: 3.2-7.1% 30 day mortality)  - Appears to be provoked 2/2 malignancy and noncompliance with oral a/c  - Having increasing difficulty with medication management at home despite home health aid  - F/U BNP and echo to assess for right heart strain  - Agree with CT head to confirm no bleed (no focal deficits on exam, but pt admits to being more confused at times). If negative and echo without significant strain, would start heparin gtt and observe x 24 hours, walk patient with PT tomorrow to assess for tachycardia or hypoxia  - If pt symptomatic with minimal exertion, desaturating or tachycardic with walking, or has evidence of right heart strain on echo, would consider catheter directed tPA.  - Would also repeat US of RLE to assess clot burden in leg. While controversial, in this patient who has a saddle embolism and medication noncompliance as well as intracranial mass that may increase risk of bleeding, if pt with large RLE DVT clot would consider discussion with vascular re: IVC filter  - Pt needs placement vs. improved help at home

## 2018-08-30 NOTE — ED PROVIDER NOTE - PHYSICAL EXAMINATION
Constitutional: Well appearing, well nourished, awake, alert, oriented to person, place, time/situation and in no apparent distress.  ENMT: Airway patent. Normal MM  Eyes: Clear bilaterally  Cardiac: Normal rate, regular rhythm.  Heart sounds S1, S2.  No murmurs, rubs or gallops.  Respiratory: Breaths sounds equal and clear b/l. No increased WOB, tachypnea, hypoxia, or accessory mm use. Pt speaks in full sentences.   Gastrointestinal: Abd soft, NT, ND, NABS. No guarding, rebound, or rigidity. No pulsatile abdominal masses. No organomegaly appreciated. No CVAT   Musculoskeletal: Range of motion is not limited. No LE edema or calf ttp. No midline spinal ttp throughout the spine. + L paraspinal thoracic mm ttp   Neuro: Alert and oriented, grossly non focal  Skin: Skin normal color for race, warm, dry and intact. No evidence of rash.  Psych: Alert and oriented to person, place, time/situation. normal mood and affect. no apparent risk to self or others.

## 2018-08-30 NOTE — ED ADULT NURSE NOTE - CHPI ED NUR SYMPTOMS NEG
no abdominal distension/no dysuria/no hematuria/no blood in stool/no diarrhea/no fever/no nausea/no chills

## 2018-08-30 NOTE — H&P ADULT - PROBLEM SELECTOR PLAN 6
1) PCP Contacted on Admission: no PMD. Under care of Dr. Mason - Neurosurgery [phone] and Dr. Mikala Miguel - Neuro Onc [phone]  2) Date of Contact with PCP: NA  3) PCP Contacted at Discharge: TBD  4) Summary of Handoff Given to PCP: TBD  5) Post-Discharge Appointment Date and Location: TBD VTE ppx: on heparin gtt.     Code: FULL CODE. F: no indication for IVF at this time.  E: replete lytes prn.  N: regular diet.

## 2018-08-30 NOTE — PROGRESS NOTE ADULT - SUBJECTIVE AND OBJECTIVE BOX
Pt known to Dr. Garrison's service, previously admitted in 7/2018 for weakness, hx of craniotomy (3/2018 high grade glioma, GBM), was uptitrated on anti seizure medication and incidentally found to have + RLE DVT associated with small PE's, discharged home on apixaban (of note, pt was recommended to go to [facility], but refused). Pt has been at home with home services (says his home attendant does not speak english and he is overwhelmed with his medications). He ran out of the eliquis 2 weeks ago and did not get a refill. Last night/this morning he noted sharp pain in the left upper back that radiated to the chest. He also noted when doing minor things around the house (getting out of bed, picking things off the floor) he was much more winded. At rest he does not feel SOB and currently his chest pain has improved. No hemoptysis, no fever/chills, no syncope or near syncope.     Patient seen and examined at bedside.      Vital Signs Last 24 Hrs  T(C): 36.7 (30 Aug 2018 16:00), Max: 36.7 (30 Aug 2018 16:00)  T(F): 98.1 (30 Aug 2018 16:00), Max: 98.1 (30 Aug 2018 16:00)  HR: 78 (30 Aug 2018 16:00) (78 - 98)  BP: 131/94 (30 Aug 2018 16:00) (112/76 - 131/94)  BP(mean): --  RR: 16 (30 Aug 2018 16:00) (16 - 16)  SpO2: 96% (30 Aug 2018 16:00) (96% - 96%)        PHYSICAL EXAM:    General:  no acute distress, on room air, somewhat poor hygiene  Eyes: pinpoint pupils, but reactive, EOM intact; conjunctiva and sclera clear  ENMT: No nasal discharge; airway clear  Neck: Supple; non tender; no masses  Respiratory: decreased BS at bases otherwise CTA B/L  Cardiovascular: Regular rate and rhythm. S1 and S2 Normal; No murmurs, gallops or rubs  Gastrointestinal: Soft non-tender non-distended; Normal bowel sounds  Extremities: + asymmetrical swelling of RLE  Neurological: Alert and oriented x3, no focal deficits  Skin: Warm and dry. No obvious rash    LABS:      CBC Full  -  ( 30 Aug 2018 12:40 )  WBC Count : 9.2 K/uL  Hemoglobin : 14.3 g/dL  Hematocrit : 43.3 %  Platelet Count - Automated : 233 K/uL  Mean Cell Volume : 88.7 fL  Mean Cell Hemoglobin : 29.3 pg  Mean Cell Hemoglobin Concentration : 33.0 g/dL  Auto Neutrophil % : 74.0 %  Auto Lymphocyte % : 12.7 %  Auto Monocyte % : 12.2 %  Auto Eosinophil % : 0.8 %  Auto Basophil % : 0.3 %    08-30    141  |  100  |  11  ----------------------------<  147<H>  4.0   |  28  |  1.45<H>    Ca    9.7      30 Aug 2018 12:40    TPro  7.2  /  Alb  4.0  /  TBili  0.4  /  DBili  x   /  AST  17  /  ALT  11  /  AlkPhos  67  08-30    PT/INR - ( 30 Aug 2018 12:40 )   PT: 12.2 sec;   INR: 1.10          PTT - ( 30 Aug 2018 12:40 )  PTT:29.3 sec                  RADIOLOGY & ADDITIONAL STUDIES (The following images were personally reviewed):  < from: CT Angio Chest PE Protocol w/ IV Cont (08.30.18 @ 14:19) >  IMPRESSION:   New saddle pulmonary embolism in the main pulmonary artery that continues   to right and left lobar arteries. Revisualization of chronic pulmonary   emboli. Possible minimal right heart strain. There are a few new small   wedge-shaped peripheral opacities in the left lower lobe which may   represent evolving pulmonary infarcts versus atelectasis.    < end of copied text >    < from: CT Angio Chest PE Protocol w/ IV Cont (07.27.18 @ 15:22) >  IMPRESSION:   Bilateral pulmonary emboli in the left lower lobar artery continuing to   multiple segmental arteries and right lower lobe subsegmental arteries.   No evidence of right heart strain.    No focal airspace consolidation, pleural effusion or pneumothorax.      The  findings above were discussed with Dr. Castro on 7/27/2018 at 3:30   PM.    < end of copied text >

## 2018-08-30 NOTE — ED PROVIDER NOTE - MEDICAL DECISION MAKING DETAILS
Pt p/w pleuritic back pain, a/w MSK characteristics. ? not on AC w/ known PE, possible recurrent PE. Also consider MSK pain, bony mets. Check labs, EKG, CTA PE protocol, analgesia. Dispo pending w/u and clinical status

## 2018-08-30 NOTE — ED ADULT NURSE REASSESSMENT NOTE - NS ED NURSE REASSESS COMMENT FT1
Pt states to RN "I am in my 15th month of having a glioblastoma. I want to die as soon as possible. I do not want CPR or a tube down my throat. I want to go as soon as possible, but I signed something saying something different." Dr Leo notified.

## 2018-08-30 NOTE — ED ADULT NURSE NOTE - OBJECTIVE STATEMENT
54 y/o presents in ER c/o severe atraumatic L flank pain beginning this AM. Tender to palpation upon assessment. Pt denies dysuria, hematuria, suprapubic tenderness. Denies fevers, chills, n/v/d. Pt has pmhx seizure disorder, bipolar, glioblastoma undergoing chemotherapy currently at [facility], last chemo last Friday per pt. #[id number] placed LAC, labs sent, awaiting urine sample.

## 2018-08-31 ENCOUNTER — RX RENEWAL (OUTPATIENT)
Age: 53
End: 2018-08-31

## 2018-08-31 LAB
ANION GAP SERPL CALC-SCNC: 12 MMOL/L — SIGNIFICANT CHANGE UP (ref 5–17)
APPEARANCE UR: CLEAR — SIGNIFICANT CHANGE UP
APPEARANCE UR: CLEAR — SIGNIFICANT CHANGE UP
APTT BLD: 105.8 SEC — HIGH (ref 27.5–37.4)
APTT BLD: 127.2 SEC — CRITICAL HIGH (ref 27.5–37.4)
APTT BLD: 142.6 SEC — CRITICAL HIGH (ref 27.5–37.4)
APTT BLD: 82.6 SEC — HIGH (ref 27.5–37.4)
APTT BLD: 94.4 SEC — HIGH (ref 27.5–37.4)
BACTERIA # UR AUTO: SIGNIFICANT CHANGE UP /HPF
BILIRUB UR-MCNC: NEGATIVE — SIGNIFICANT CHANGE UP
BILIRUB UR-MCNC: NEGATIVE — SIGNIFICANT CHANGE UP
BUN SERPL-MCNC: 12 MG/DL — SIGNIFICANT CHANGE UP (ref 7–23)
CALCIUM SERPL-MCNC: 9.6 MG/DL — SIGNIFICANT CHANGE UP (ref 8.4–10.5)
CHLORIDE SERPL-SCNC: 101 MMOL/L — SIGNIFICANT CHANGE UP (ref 96–108)
CO2 SERPL-SCNC: 28 MMOL/L — SIGNIFICANT CHANGE UP (ref 22–31)
COLOR SPEC: YELLOW — SIGNIFICANT CHANGE UP
COLOR SPEC: YELLOW — SIGNIFICANT CHANGE UP
CREAT ?TM UR-MCNC: 223 MG/DL — SIGNIFICANT CHANGE UP
CREAT SERPL-MCNC: 1.47 MG/DL — HIGH (ref 0.5–1.3)
DIFF PNL FLD: ABNORMAL
DIFF PNL FLD: ABNORMAL
EPI CELLS # UR: SIGNIFICANT CHANGE UP /HPF (ref 0–5)
GLUCOSE SERPL-MCNC: 113 MG/DL — HIGH (ref 70–99)
GLUCOSE UR QL: NEGATIVE — SIGNIFICANT CHANGE UP
GLUCOSE UR QL: NEGATIVE — SIGNIFICANT CHANGE UP
HCT VFR BLD CALC: 42.8 % — SIGNIFICANT CHANGE UP (ref 39–50)
HGB BLD-MCNC: 13.6 G/DL — SIGNIFICANT CHANGE UP (ref 13–17)
KETONES UR-MCNC: NEGATIVE — SIGNIFICANT CHANGE UP
KETONES UR-MCNC: NEGATIVE — SIGNIFICANT CHANGE UP
LEUKOCYTE ESTERASE UR-ACNC: NEGATIVE — SIGNIFICANT CHANGE UP
LEUKOCYTE ESTERASE UR-ACNC: NEGATIVE — SIGNIFICANT CHANGE UP
MAGNESIUM SERPL-MCNC: 2.2 MG/DL — SIGNIFICANT CHANGE UP (ref 1.6–2.6)
MCHC RBC-ENTMCNC: 28.8 PG — SIGNIFICANT CHANGE UP (ref 27–34)
MCHC RBC-ENTMCNC: 31.8 G/DL — LOW (ref 32–36)
MCV RBC AUTO: 90.5 FL — SIGNIFICANT CHANGE UP (ref 80–100)
NITRITE UR-MCNC: NEGATIVE — SIGNIFICANT CHANGE UP
NITRITE UR-MCNC: NEGATIVE — SIGNIFICANT CHANGE UP
PH UR: 6 — SIGNIFICANT CHANGE UP (ref 5–8)
PH UR: 6 — SIGNIFICANT CHANGE UP (ref 5–8)
PLATELET # BLD AUTO: 236 K/UL — SIGNIFICANT CHANGE UP (ref 150–400)
POTASSIUM SERPL-MCNC: 4.1 MMOL/L — SIGNIFICANT CHANGE UP (ref 3.5–5.3)
POTASSIUM SERPL-SCNC: 4.1 MMOL/L — SIGNIFICANT CHANGE UP (ref 3.5–5.3)
PROT ?TM UR-MCNC: 31 MG/DL — HIGH (ref 0–12)
PROT UR-MCNC: ABNORMAL MG/DL
PROT UR-MCNC: NEGATIVE MG/DL — SIGNIFICANT CHANGE UP
PROT/CREAT UR-RTO: 0.1 RATIO — SIGNIFICANT CHANGE UP (ref 0–0.2)
RBC # BLD: 4.73 M/UL — SIGNIFICANT CHANGE UP (ref 4.2–5.8)
RBC # FLD: 13.7 % — SIGNIFICANT CHANGE UP (ref 10.3–16.9)
RBC CASTS # UR COMP ASSIST: < 5 /HPF — SIGNIFICANT CHANGE UP
SODIUM SERPL-SCNC: 141 MMOL/L — SIGNIFICANT CHANGE UP (ref 135–145)
SODIUM UR-SCNC: 93 MMOL/L — SIGNIFICANT CHANGE UP
SP GR SPEC: 1.01 — SIGNIFICANT CHANGE UP (ref 1–1.03)
SP GR SPEC: 1.02 — SIGNIFICANT CHANGE UP (ref 1–1.03)
UROBILINOGEN FLD QL: 0.2 E.U./DL — SIGNIFICANT CHANGE UP
UROBILINOGEN FLD QL: 1 E.U./DL — SIGNIFICANT CHANGE UP
WBC # BLD: 8.5 K/UL — SIGNIFICANT CHANGE UP (ref 3.8–10.5)
WBC # FLD AUTO: 8.5 K/UL — SIGNIFICANT CHANGE UP (ref 3.8–10.5)
WBC UR QL: < 5 /HPF — SIGNIFICANT CHANGE UP

## 2018-08-31 PROCEDURE — 99233 SBSQ HOSP IP/OBS HIGH 50: CPT | Mod: GC

## 2018-08-31 PROCEDURE — 99232 SBSQ HOSP IP/OBS MODERATE 35: CPT

## 2018-08-31 RX ORDER — HEPARIN SODIUM 5000 [USP'U]/ML
1300 INJECTION INTRAVENOUS; SUBCUTANEOUS
Qty: 25000 | Refills: 0 | Status: DISCONTINUED | OUTPATIENT
Start: 2018-08-31 | End: 2018-08-31

## 2018-08-31 RX ORDER — LEVETIRACETAM 1000 MG/1
1000 TABLET, FILM COATED ORAL TWICE DAILY
Qty: 60 | Refills: 5 | Status: ACTIVE | COMMUNITY
Start: 2018-08-31 | End: 1900-01-01

## 2018-08-31 RX ORDER — HEPARIN SODIUM 5000 [USP'U]/ML
1500 INJECTION INTRAVENOUS; SUBCUTANEOUS
Qty: 25000 | Refills: 0 | Status: DISCONTINUED | OUTPATIENT
Start: 2018-08-31 | End: 2018-08-31

## 2018-08-31 RX ORDER — HEPARIN SODIUM 5000 [USP'U]/ML
1100 INJECTION INTRAVENOUS; SUBCUTANEOUS
Qty: 25000 | Refills: 0 | Status: DISCONTINUED | OUTPATIENT
Start: 2018-08-31 | End: 2018-09-03

## 2018-08-31 RX ORDER — HEPARIN SODIUM 5000 [USP'U]/ML
1200 INJECTION INTRAVENOUS; SUBCUTANEOUS
Qty: 25000 | Refills: 0 | Status: DISCONTINUED | OUTPATIENT
Start: 2018-08-31 | End: 2018-08-31

## 2018-08-31 RX ADMIN — HYDROMORPHONE HYDROCHLORIDE 0.5 MILLIGRAM(S): 2 INJECTION INTRAMUSCULAR; INTRAVENOUS; SUBCUTANEOUS at 14:15

## 2018-08-31 RX ADMIN — HYDROMORPHONE HYDROCHLORIDE 0.5 MILLIGRAM(S): 2 INJECTION INTRAMUSCULAR; INTRAVENOUS; SUBCUTANEOUS at 09:48

## 2018-08-31 RX ADMIN — PANTOPRAZOLE SODIUM 40 MILLIGRAM(S): 20 TABLET, DELAYED RELEASE ORAL at 06:34

## 2018-08-31 RX ADMIN — HYDROMORPHONE HYDROCHLORIDE 0.5 MILLIGRAM(S): 2 INJECTION INTRAMUSCULAR; INTRAVENOUS; SUBCUTANEOUS at 10:15

## 2018-08-31 RX ADMIN — Medication 650 MILLIGRAM(S): at 06:38

## 2018-08-31 RX ADMIN — RISPERIDONE 0.5 MILLIGRAM(S): 4 TABLET ORAL at 11:19

## 2018-08-31 RX ADMIN — Medication 20 MILLIGRAM(S): at 11:19

## 2018-08-31 RX ADMIN — Medication 100 MILLIGRAM(S): at 14:16

## 2018-08-31 RX ADMIN — HEPARIN SODIUM 15 UNIT(S)/HR: 5000 INJECTION INTRAVENOUS; SUBCUTANEOUS at 03:01

## 2018-08-31 RX ADMIN — ARIPIPRAZOLE 15 MILLIGRAM(S): 15 TABLET ORAL at 11:19

## 2018-08-31 RX ADMIN — HYDROMORPHONE HYDROCHLORIDE 0.5 MILLIGRAM(S): 2 INJECTION INTRAMUSCULAR; INTRAVENOUS; SUBCUTANEOUS at 14:35

## 2018-08-31 RX ADMIN — HEPARIN SODIUM 11 UNIT(S)/HR: 5000 INJECTION INTRAVENOUS; SUBCUTANEOUS at 17:59

## 2018-08-31 RX ADMIN — SENNA PLUS 1 TABLET(S): 8.6 TABLET ORAL at 22:53

## 2018-08-31 RX ADMIN — HYDROMORPHONE HYDROCHLORIDE 0.5 MILLIGRAM(S): 2 INJECTION INTRAMUSCULAR; INTRAVENOUS; SUBCUTANEOUS at 23:16

## 2018-08-31 RX ADMIN — HEPARIN SODIUM 13 UNIT(S)/HR: 5000 INJECTION INTRAVENOUS; SUBCUTANEOUS at 11:54

## 2018-08-31 RX ADMIN — LEVETIRACETAM 1000 MILLIGRAM(S): 250 TABLET, FILM COATED ORAL at 11:19

## 2018-08-31 RX ADMIN — LEVETIRACETAM 1000 MILLIGRAM(S): 250 TABLET, FILM COATED ORAL at 22:53

## 2018-08-31 RX ADMIN — Medication 0.6 MILLIGRAM(S): at 11:19

## 2018-08-31 RX ADMIN — Medication 100 MILLIGRAM(S): at 22:53

## 2018-08-31 RX ADMIN — HYDROMORPHONE HYDROCHLORIDE 0.5 MILLIGRAM(S): 2 INJECTION INTRAMUSCULAR; INTRAVENOUS; SUBCUTANEOUS at 22:59

## 2018-08-31 RX ADMIN — LAMOTRIGINE 25 MILLIGRAM(S): 25 TABLET, ORALLY DISINTEGRATING ORAL at 00:22

## 2018-08-31 RX ADMIN — RISPERIDONE 0.5 MILLIGRAM(S): 4 TABLET ORAL at 22:53

## 2018-08-31 RX ADMIN — Medication 100 MILLIGRAM(S): at 06:34

## 2018-08-31 NOTE — PROGRESS NOTE ADULT - SUBJECTIVE AND OBJECTIVE BOX
Interval Events: ANGELITA  Patient seen and examined at bedside. pt c/o intermittent sharp pain in the left upper back and chest area, improves with dilaudid. No SOB, but has not been OOBTC. PT to work with him after 4pm (needs 24 hr of a/c prior to PT given known DVT). Pt still confused about diagnosis and medical management despite multiple conversations.         MEDICATIONS:  Pulmonary:    Antimicrobials:    Anticoagulants:  heparin  Infusion 1300 Unit(s)/Hr IV Continuous <Continuous>    Cardiac:      Allergies    No Known Allergies    Intolerances        Vital Signs Last 24 Hrs  T(C): 36.6 (31 Aug 2018 12:01), Max: 36.8 (30 Aug 2018 18:32)  T(F): 97.9 (31 Aug 2018 12:01), Max: 98.2 (30 Aug 2018 18:32)  HR: 73 (31 Aug 2018 12:01) (73 - 89)  BP: 116/81 (31 Aug 2018 12:01) (116/81 - 132/88)  BP(mean): --  RR: 16 (31 Aug 2018 12:01) (16 - 17)  SpO2: 96% (31 Aug 2018 12:01) (94% - 96%)     @ 07:01  -   @ 07:00  --------------------------------------------------------  IN: 69 mL / OUT: 0 mL / NET: 69 mL          PHYSICAL EXAM:    General:  no acute distress, on room air  Eyes: pinpoint pupils, but reactive, EOM intact; conjunctiva and sclera clear  ENMT: No nasal discharge; airway clear  Neck: Supple; non tender; no masses  Respiratory: decreased BS at bases otherwise CTA B/L  Cardiovascular: Regular rate and rhythm. S1 and S2 Normal; No murmurs, gallops or rubs  Gastrointestinal: Soft non-tender non-distended; Normal bowel sounds  Extremities: + asymmetrical swelling of RLE  Neurological: Alert and oriented x3, no focal deficits  Skin: Warm and dry. No obvious rash    LABS:      CBC Full  -  ( 31 Aug 2018 06:11 )  WBC Count : 8.5 K/uL  Hemoglobin : 13.6 g/dL  Hematocrit : 42.8 %  Platelet Count - Automated : 236 K/uL  Mean Cell Volume : 90.5 fL  Mean Cell Hemoglobin : 28.8 pg  Mean Cell Hemoglobin Concentration : 31.8 g/dL  Auto Neutrophil # : x  Auto Lymphocyte # : x  Auto Monocyte # : x  Auto Eosinophil # : x  Auto Basophil # : x  Auto Neutrophil % : x  Auto Lymphocyte % : x  Auto Monocyte % : x  Auto Eosinophil % : x  Auto Basophil % : x        141  |  101  |  12  ----------------------------<  113<H>  4.1   |  28  |  1.47<H>    Ca    9.6      31 Aug 2018 05:36  Mg     2.2         TPro  7.2  /  Alb  4.0  /  TBili  0.4  /  DBili  x   /  AST  17  /  ALT  11  /  AlkPhos  67      PT/INR - ( 30 Aug 2018 12:40 )   PT: 12.2 sec;   INR: 1.10          PTT - ( 31 Aug 2018 10:55 )  PTT:127.2 sec      Urinalysis Basic - ( 31 Aug 2018 13:25 )    Color: Yellow / Appearance: Clear / S.020 / pH: x  Gluc: x / Ketone: NEGATIVE  / Bili: Negative / Urobili: 1.0 E.U./dL   Blood: x / Protein: Trace mg/dL / Nitrite: NEGATIVE   Leuk Esterase: NEGATIVE / RBC: < 5 /HPF / WBC < 5 /HPF   Sq Epi: x / Non Sq Epi: 0-5 /HPF / Bacteria: Present /HPF                RADIOLOGY & ADDITIONAL STUDIES (The following images were personally reviewed):

## 2018-08-31 NOTE — PROGRESS NOTE ADULT - PROBLEM SELECTOR PLAN 1
- large clot burden with saddle embolism, likely acute on chronic given pt with no increased oxygen requirements, no tachycardia, no tachypnea, normal troponin, EKG with NSR. PESI score 95 (intermediate risk: 3.2-7.1% 30 day mortality)  - Appears to be provoked 2/2 malignancy and noncompliance with oral a/c  - Having increasing difficulty with medication management at home despite home health aid  - F/U BNP and echo to assess for right heart strain  - CT head negative and neurosurgery OK'd anticoagulation  - c/w heparain gtt until pt able to ambulate  - If pt symptomatic with minimal exertion, desaturating or tachycardic with walking, or has evidence of right heart strain on echo, would consider catheter directed tPA.  - Would also repeat US of RLE to assess clot burden in leg. While controversial, in this patient who has a saddle embolism and medication noncompliance as well as intracranial mass that may increase risk of bleeding, if pt with large RLE DVT clot would consider discussion with vascular re: IVC filter  - If continues to remain hemodynamically stable without significant respiratory compromise from PE, can switch to A/C. Pt does not seem to be a good candidate for injections of lovenox and recent meta-analysis have stated DOACs are effective in DVT/PE with malignancy. Would consider edoxaban.   - Pt needs placement vs. improved help at home

## 2018-08-31 NOTE — PROGRESS NOTE ADULT - PROBLEM SELECTOR PLAN 2
LE Dopplers in 7/2018 showed extensive deep vein thrombosis throughout the veins of the right thigh. Patient was started on Eliquis, but has not been taking it over the last two weeks.  - f/u LE dopplers to assess clot burden. If increased, consider Vascular consult for workup  - Consider IVC filter

## 2018-08-31 NOTE — PROGRESS NOTE ADULT - SUBJECTIVE AND OBJECTIVE BOX
OVERNIGHT EVENTS: PTT adjusted per nomogram    SUBJECTIVE / INTERVAL HPI: Patient seen and examined at bedside. Patient complaining of back pain and left > right flank pain with inhalation and movement. Patient admits to some SOB. Denied chest pain, n/v/d/c, fevers, chills, night sweats.     VITAL SIGNS:  Vital Signs Last 24 Hrs  T(C): 36.6 (31 Aug 2018 17:45), Max: 36.7 (31 Aug 2018 05:22)  T(F): 97.8 (31 Aug 2018 17:45), Max: 98.1 (31 Aug 2018 05:22)  HR: 80 (31 Aug 2018 17:45) (73 - 89)  BP: 130/86 (31 Aug 2018 17:45) (116/81 - 132/88)  BP(mean): --  RR: 16 (31 Aug 2018 17:45) (16 - 16)  SpO2: 92% (31 Aug 2018 17:45) (92% - 96%)    PHYSICAL EXAM:    General: Discomfort with moving, otherwise in NAD. No use of respiratory muscles for inspiration.  HEENT: PERRL, anicteric sclera; MMM  Neck: supple  Cardiovascular: +S1/S2, RRR  Respiratory: Deferred as patient not compliant due to pain.   Gastrointestinal: soft, NT/ND; +BSx4  Back: CVA tenderness on left.   Extremities: RLE edema > Left. No erythema, no cyanosis b/l  Vascular: 2+ radial, 1+ posterior tibial pulses b/l  Neurological: AAOx3; no focal deficits    MEDICATIONS:  MEDICATIONS  (STANDING):  ARIPiprazole 15 milliGRAM(s) Oral daily  colchicine 0.6 milliGRAM(s) Oral daily  docusate sodium 100 milliGRAM(s) Oral three times a day  FLUoxetine 20 milliGRAM(s) Oral daily  heparin  Infusion 1100 Unit(s)/Hr (11 mL/Hr) IV Continuous <Continuous>  lamoTRIgine 25 milliGRAM(s) Oral two times a day  levETIRAcetam 1000 milliGRAM(s) Oral two times a day  pantoprazole    Tablet 40 milliGRAM(s) Oral before breakfast  risperiDONE   Tablet 0.5 milliGRAM(s) Oral two times a day  senna 1 Tablet(s) Oral at bedtime    MEDICATIONS  (PRN):  acetaminophen   Tablet 650 milliGRAM(s) Oral every 6 hours PRN Mild Pain (1 - 3)  HYDROmorphone  Injectable 0.5 milliGRAM(s) IV Push every 4 hours PRN Moderate Pain (4 - 6)      ALLERGIES:  Allergies    No Known Allergies    Intolerances        LABS:                        13.6   8.5   )-----------( 236      ( 31 Aug 2018 06:11 )             42.8         141  |  101  |  12  ----------------------------<  113<H>  4.1   |  28  |  1.47<H>    Ca    9.6      31 Aug 2018 05:36  Mg     2.2         TPro  7.2  /  Alb  4.0  /  TBili  0.4  /  DBili  x   /  AST  17  /  ALT  11  /  AlkPhos  67      PT/INR - ( 30 Aug 2018 12:40 )   PT: 12.2 sec;   INR: 1.10          PTT - ( 31 Aug 2018 16:59 )  PTT:105.8 sec  Urinalysis Basic - ( 31 Aug 2018 13:25 )    Color: Yellow / Appearance: Clear / S.020 / pH: x  Gluc: x / Ketone: NEGATIVE  / Bili: Negative / Urobili: 1.0 E.U./dL   Blood: x / Protein: Trace mg/dL / Nitrite: NEGATIVE   Leuk Esterase: NEGATIVE / RBC: < 5 /HPF / WBC < 5 /HPF   Sq Epi: x / Non Sq Epi: 0-5 /HPF / Bacteria: Present /HPF      CAPILLARY BLOOD GLUCOSE          RADIOLOGY & ADDITIONAL TESTS: Reviewed.    < from: Echocardiogram (18 @ 16:14) >  The left atrial size is normal. Right atrial size is normal.Structurally   normal   aortic valve. The aortic valve is trileaflet. No aortic regurgitation   noted.   No hemodynamically significant valvular aortic stenosis.  Structurally   normal   mitral valve. There is trace mitral regurgitation.Structurally normal   tricuspid valve. There is trace to mild tricuspid regurgitation.  There   was   insufficient TR detected from which to calculate pulmonary artery   systolic   pressure.  Thepulmonic valve is not well visualized. There is trace   pulmonic   regurgitation.  The right ventricle is mildly dilated. The right   ventricular   systolic function is normal.  There is mild concentric left ventricular   hypertrophy. Probably normalleft ventricular wall motion. The left   ventricular ejection fraction is borderline normal with estimated EF of   50-55%.  No aortic root dilatation.There is no pericardial effusion.No   prior   study for comparison.

## 2018-08-31 NOTE — PROGRESS NOTE ADULT - PROBLEM SELECTOR PLAN 3
Patient last saw his neuro oncologist Dr. Mikala Miguel on 8/20/18. As per her note, the GBM was initially diagnosed in 2017 with onset of GTC seizures. A right sided parietal mass was resected at [facility] by Dr. Tucker Lai. Path showed GBM (IDH wt, MGMT methylated, EGFR amplified. He was treated by Dr. Cho at [place] with STUPP protocol - had 2 cycle sof adjuvant TMZ and was found to have radiographic progression on MRI - with a new lesion in the left parietal lobe - this was resected by Dr. Mason on 3/13/18 - pathology was GBM, IDH WT, MGMT unmethylated, and EGFR amplified. He received RT with IV Avastin - he has had a total of 7 infusions.  - Neurosurgery saw patient in ED. Cleared to start on heparin gtt.  - Will follow recommendations.  - Low threshold to scan head if there is a change in mental status

## 2018-08-31 NOTE — PROGRESS NOTE ADULT - ASSESSMENT
52 y/o M with PMHx GBM (dx 2017, mass resections x 2, chemo, radiation), PE, gout, seizure d/o, depression, anxiety, presents for dizziness, left sided neck/back pain, and substernal chest pain. He describes left sided back pain that woke him up early this morning. Admitted to [facility] for saddle embolus on CTA with mild RV strain on TTE, likely 2/2 GBM. Currently HD stable on heparin gtt.

## 2018-08-31 NOTE — PROGRESS NOTE ADULT - PROBLEM SELECTOR PLAN 1
CTA chest showed new saddle pulmonary embolism in the main pulmonary artery that continues to right and left lobar arteries. Revisualization of chronic pulmonary emboli. Possible minimal right heart strain. There are a few new small wedge-shaped peripheral opacities in the left lower lobe which may represent evolving pulmonary infarcts versus atelectasis. Etiology likely secondary to malignancy and medication non-adherance. Currently with mild substernal CP at rest, no SOB or hemoptysis. VS no tachycardia or hypotension. Trops and BNP neg. TTE with mild RV strain. PESI score 95 (intermediate risk: 3.2-7.1% 30 day mortality). CT head negative for bleed, cleared by Neurosurgery and started on heparin gtt.  - Continue heparin gtt and check serial PTT - adjust heparin according to nomogram. Currently at 11cc/hr  - Pulm following. Appreciate recs.  -consider IVC filter if DVT burden high   -consider transitioning to AC if patient hemodynamically stable with ambulation and remains stable

## 2018-08-31 NOTE — PROGRESS NOTE ADULT - ASSESSMENT
52 yo M with hx of GBM receiving chemotherapy and recent DVT/PE not on a/c for the last 2 weeks presents with left sided upper back and chest pain associated with saddle embolism on CTA

## 2018-09-01 LAB
ANION GAP SERPL CALC-SCNC: 13 MMOL/L — SIGNIFICANT CHANGE UP (ref 5–17)
APTT BLD: 61.8 SEC — HIGH (ref 27.5–37.4)
BUN SERPL-MCNC: 12 MG/DL — SIGNIFICANT CHANGE UP (ref 7–23)
CALCIUM SERPL-MCNC: 9.2 MG/DL — SIGNIFICANT CHANGE UP (ref 8.4–10.5)
CHLORIDE SERPL-SCNC: 97 MMOL/L — SIGNIFICANT CHANGE UP (ref 96–108)
CO2 SERPL-SCNC: 26 MMOL/L — SIGNIFICANT CHANGE UP (ref 22–31)
CREAT SERPL-MCNC: 1.31 MG/DL — HIGH (ref 0.5–1.3)
GLUCOSE SERPL-MCNC: 112 MG/DL — HIGH (ref 70–99)
INR BLD: 1.21 — HIGH (ref 0.88–1.16)
POTASSIUM SERPL-MCNC: 4 MMOL/L — SIGNIFICANT CHANGE UP (ref 3.5–5.3)
POTASSIUM SERPL-SCNC: 4 MMOL/L — SIGNIFICANT CHANGE UP (ref 3.5–5.3)
PROTHROM AB SERPL-ACNC: 13.5 SEC — HIGH (ref 9.8–12.7)
SODIUM SERPL-SCNC: 136 MMOL/L — SIGNIFICANT CHANGE UP (ref 135–145)

## 2018-09-01 PROCEDURE — 93970 EXTREMITY STUDY: CPT | Mod: 26

## 2018-09-01 PROCEDURE — 99221 1ST HOSP IP/OBS SF/LOW 40: CPT | Mod: GC

## 2018-09-01 PROCEDURE — 99233 SBSQ HOSP IP/OBS HIGH 50: CPT

## 2018-09-01 PROCEDURE — 99232 SBSQ HOSP IP/OBS MODERATE 35: CPT

## 2018-09-01 RX ADMIN — Medication 20 MILLIGRAM(S): at 11:38

## 2018-09-01 RX ADMIN — SENNA PLUS 1 TABLET(S): 8.6 TABLET ORAL at 21:45

## 2018-09-01 RX ADMIN — Medication 100 MILLIGRAM(S): at 21:45

## 2018-09-01 RX ADMIN — RISPERIDONE 0.5 MILLIGRAM(S): 4 TABLET ORAL at 17:33

## 2018-09-01 RX ADMIN — PANTOPRAZOLE SODIUM 40 MILLIGRAM(S): 20 TABLET, DELAYED RELEASE ORAL at 06:54

## 2018-09-01 RX ADMIN — HYDROMORPHONE HYDROCHLORIDE 0.5 MILLIGRAM(S): 2 INJECTION INTRAMUSCULAR; INTRAVENOUS; SUBCUTANEOUS at 21:45

## 2018-09-01 RX ADMIN — HYDROMORPHONE HYDROCHLORIDE 0.5 MILLIGRAM(S): 2 INJECTION INTRAMUSCULAR; INTRAVENOUS; SUBCUTANEOUS at 17:39

## 2018-09-01 RX ADMIN — HYDROMORPHONE HYDROCHLORIDE 0.5 MILLIGRAM(S): 2 INJECTION INTRAMUSCULAR; INTRAVENOUS; SUBCUTANEOUS at 12:13

## 2018-09-01 RX ADMIN — LEVETIRACETAM 1000 MILLIGRAM(S): 250 TABLET, FILM COATED ORAL at 17:33

## 2018-09-01 RX ADMIN — HEPARIN SODIUM 11 UNIT(S)/HR: 5000 INJECTION INTRAVENOUS; SUBCUTANEOUS at 08:15

## 2018-09-01 RX ADMIN — RISPERIDONE 0.5 MILLIGRAM(S): 4 TABLET ORAL at 06:55

## 2018-09-01 RX ADMIN — LAMOTRIGINE 25 MILLIGRAM(S): 25 TABLET, ORALLY DISINTEGRATING ORAL at 00:15

## 2018-09-01 RX ADMIN — HYDROMORPHONE HYDROCHLORIDE 0.5 MILLIGRAM(S): 2 INJECTION INTRAMUSCULAR; INTRAVENOUS; SUBCUTANEOUS at 06:56

## 2018-09-01 RX ADMIN — Medication 100 MILLIGRAM(S): at 13:55

## 2018-09-01 RX ADMIN — Medication 100 MILLIGRAM(S): at 06:54

## 2018-09-01 RX ADMIN — LAMOTRIGINE 25 MILLIGRAM(S): 25 TABLET, ORALLY DISINTEGRATING ORAL at 21:45

## 2018-09-01 RX ADMIN — LEVETIRACETAM 1000 MILLIGRAM(S): 250 TABLET, FILM COATED ORAL at 06:54

## 2018-09-01 RX ADMIN — HYDROMORPHONE HYDROCHLORIDE 0.5 MILLIGRAM(S): 2 INJECTION INTRAMUSCULAR; INTRAVENOUS; SUBCUTANEOUS at 16:06

## 2018-09-01 RX ADMIN — LAMOTRIGINE 25 MILLIGRAM(S): 25 TABLET, ORALLY DISINTEGRATING ORAL at 11:38

## 2018-09-01 RX ADMIN — HYDROMORPHONE HYDROCHLORIDE 0.5 MILLIGRAM(S): 2 INJECTION INTRAMUSCULAR; INTRAVENOUS; SUBCUTANEOUS at 11:39

## 2018-09-01 RX ADMIN — ARIPIPRAZOLE 15 MILLIGRAM(S): 15 TABLET ORAL at 11:39

## 2018-09-01 RX ADMIN — HYDROMORPHONE HYDROCHLORIDE 0.5 MILLIGRAM(S): 2 INJECTION INTRAMUSCULAR; INTRAVENOUS; SUBCUTANEOUS at 07:15

## 2018-09-01 RX ADMIN — Medication 0.6 MILLIGRAM(S): at 11:39

## 2018-09-01 NOTE — PROGRESS NOTE ADULT - SUBJECTIVE AND OBJECTIVE BOX
Interval Events:  Patient seen and examined at bedside. The patient denies any acute complaints over night except for difficulty sleeping.     MEDICATIONs:    Anticoagulants:  heparin  Infusion 1100 Unit(s)/Hr IV Continuous <Continuous>        Endocrine:  colchicine 0.6 milliGRAM(s) Oral daily    Allergies    No Known Allergies    Intolerances        Vital Signs Last 24 Hrs  T(C): 36.4 (01 Sep 2018 12:57), Max: 36.7 (01 Sep 2018 05:13)  T(F): 97.6 (01 Sep 2018 12:57), Max: 98.1 (01 Sep 2018 05:13)  HR: 83 (01 Sep 2018 12:57) (83 - 83)  BP: 120/80 (01 Sep 2018 12:57) (119/82 - 120/80)  BP(mean): --  RR: 16 (01 Sep 2018 12:57) (15 - 16)  SpO2: 95% (01 Sep 2018 12:57) (94% - 95%)    General:  no acute distress, on room air  Eyes: pinpoint pupils, but reactive, EOM intact; conjunctiva and sclera clear  ENMT: No nasal discharge; airway clear  Neck: Supple; non tender; no masses  Respiratory: decreased BS at bases otherwise CTA B/L  Cardiovascular: Regular rate and rhythm. S1 and S2 Normal; No murmurs, gallops or rubs  Gastrointestinal: Soft non-tender non-distended; Normal bowel sounds  Extremities: + asymmetrical swelling of RLE  Neurological: Alert and oriented x3, no focal deficits  Skin: Warm and dry. No obvious rash     @ 07:01  -  09- @ 07:00  --------------------------------------------------------  IN: 121 mL / OUT: 200 mL / NET: -79 mL          LABS:      CBC Full  -  ( 31 Aug 2018 06:11 )  WBC Count : 8.5 K/uL  Hemoglobin : 13.6 g/dL  Hematocrit : 42.8 %  Platelet Count - Automated : 236 K/uL  Mean Cell Volume : 90.5 fL  Mean Cell Hemoglobin : 28.8 pg  Mean Cell Hemoglobin Concentration : 31.8 g/dL  Auto Neutrophil # : x  Auto Lymphocyte # : x  Auto Monocyte # : x  Auto Eosinophil # : x  Auto Basophil # : x  Auto Neutrophil % : x  Auto Lymphocyte % : x  Auto Monocyte % : x  Auto Eosinophil % : x  Auto Basophil % : x        136  |  97  |  12  ----------------------------<  112<H>  4.0   |  26  |  1.31<H>    Ca    9.2      01 Sep 2018 06:51  Mg     2.2     08-31      PT/INR - ( 01 Sep 2018 06:51 )   PT: 13.5 sec;   INR: 1.21          PTT - ( 01 Sep 2018 06:51 )  PTT:61.8 sec      Urinalysis Basic - ( 31 Aug 2018 13:25 )    Color: Yellow / Appearance: Clear / S.020 / pH: x  Gluc: x / Ketone: NEGATIVE  / Bili: Negative / Urobili: 1.0 E.U./dL   Blood: x / Protein: Trace mg/dL / Nitrite: NEGATIVE   Leuk Esterase: NEGATIVE / RBC: < 5 /HPF / WBC < 5 /HPF   Sq Epi: x / Non Sq Epi: 0-5 /HPF / Bacteria: Present /HPF                RADIOLOGY & ADDITIONAL STUDIES (The following images were personally reviewed):  < from: US Duplex Venous Lower Ext Complete, Bilateral (18 @ 10:18) >    There is again large thrombus within the right common femoral vein and   right femoral vein which is partially recanalized. The right deep femoral   vein is patent.  The right popliteal vein is occluded by thrombus.    The left common femoral, femoral, popliteal, proximal greater saphenous,   and proximal deep femoral veins are patent and free of thrombus. The   veins are normally compressible and have normal phasic flow and   augmentation response.     Calf veins: The paired peroneal and posterior tibial calf veins are   patent  bilaterally.     IMPRESSION:  Extensive deep vein thrombosis throughout the veins of the right thigh   again seen as above. Partial recanalization of the proximal and mid right   femoral vein.    < end of copied text >

## 2018-09-01 NOTE — CONSULT NOTE ADULT - ASSESSMENT
Assessment: 53y Male with a h/o GBM (s/p resection), gout, and anxiety, presents with recurrent DVT/PE found on CT and Duplex U/S after non-compliance with therapeutic anticoagulation    Recommendations:  - Risks outweight the benefits of IVC filter placement at this time  - Continue anticoagulation per primary team  - Please ensure patient compliance with anticoagulation  - discussed with Chief on call  - call x [phone] with questions no

## 2018-09-01 NOTE — PROGRESS NOTE ADULT - PROBLEM SELECTOR PLAN 1
- large clot burden with saddle embolism, likely acute on chronic given pt with no increased oxygen requirements, no tachycardia, no tachypnea, normal troponin, EKG with NSR. PESI score 95 (intermediate risk: 3.2-7.1% 30 day mortality)  - Appears to be provoked 2/2 malignancy and noncompliance with oral a/c  - Having increasing difficulty with medication management at home despite home health aid  - CT head negative and neurosurgery OK'd anticoagulation  - LE dopplers showing large clots on the right side essentially involving most of the RLE. Given patient clot burden, saddle PE, malignancy, and difficulties with compliance, would recommend a vascular surgery consult to evaluate for IVC filter placement.   -would recommend edoxaban or lovenox upon discharge.

## 2018-09-01 NOTE — CONSULT NOTE ADULT - SUBJECTIVE AND OBJECTIVE BOX
Attending: Puneet    HPI:  52yo M with a PMH of GBM (s/p resections last year, s/p chem/radiation), h/o DVT/PE, and gout, presented to [facility] with a chief complaint of shortness of breath and RLE swelling and pain. He reports that 1 month ago, he was diagnosed with a DVT/PE for which he was discharged with Eliquis. However, due to a miscommunication anxiety regarding his medications, the patient only took 2 weeks of Eliquis, then stopped for 2 weeks. He reported feeling better and did not think he needed to refill his Eliquis. Upon admission/workup during this hospitalization, CTA chest showed new saddle pulmonary embolism in the main pulmonary artery that continues to right and left lobar arteries. Revisualization of chronic pulmonary emboli. Possible minimal right heart strain. There are a few new small wedge-shaped peripheral opacities in the left lower lobe which may represent evolving pulmonary infarcts versus atelectasis. CT head showed no definite intracranial hemorrhage. Patient status post right frontoparietal and left parietal craniotomy with residual tumoral disease and/or post therapeutic changes as described above. Repeat duplex U/S showed similar clot burden as previous hospitalization. At the time of exam, the patient denies chest pain, shortness of breath, or difficulty breathing. The patient does report some issues with memory since his diagnosis.    PMH:  GBM  Anxity/Depression  Gout    PSH:  Craniotomy - 2017    Meds:  ARIPiprazole 15 milliGRAM(s) Oral daily  colchicine 0.6 milliGRAM(s) Oral daily  FLUoxetine 20 milliGRAM(s) Oral daily  lamoTRIgine 25 milliGRAM(s) Oral two times a day  levETIRAcetam 1000 milliGRAM(s) Oral two times a day  risperiDONE   Tablet 0.5 milliGRAM(s) Oral two times a day    Allergies:  No Known Allergies    FAMILY HISTORY:  No pertinent family history in first degree relatives      REVIEW OF SYSTEMS  General: No acute complaints	  Respiratory and Thorax:	No shortness of breath/difficulty breathing  Cardiovascular:	No chest pain/palpitations  Gastrointestinal:	No abdominal pain  Musculoskeletal:	Reports continued swelling in RLE  Neurological: Reports some issues with memory/recall  Psychiatric: Reports generalized anxiety    Vital Signs Last 24 Hrs  T(C): 36.4 (01 Sep 2018 12:57), Max: 36.7 (01 Sep 2018 05:13)  T(F): 97.6 (01 Sep 2018 12:57), Max: 98.1 (01 Sep 2018 05:13)  HR: 83 (01 Sep 2018 12:57) (83 - 83)  BP: 120/80 (01 Sep 2018 12:57) (119/82 - 120/80)  BP(mean): --  RR: 16 (01 Sep 2018 12:57) (15 - 16)  SpO2: 95% (01 Sep 2018 12:57) (94% - 95%)    I&O's Summary  31 Aug 2018 07:01  -  01 Sep 2018 07:00  --------------------------------------------------------  IN: 121 mL / OUT: 200 mL / NET: -79 mL        Physical Exam:  General: NAD, resting comfortably  Pulmonary: normal resp effort, CTA-B  Cardiovascular: RRR, normal S1/S2  Abdominal: soft, NT/ND  Extremities: 1+ edema in RLE; no tenderness to palpation  Neuro: A/O x 3; speaks with slurred speech    LABS:                        13.6   8.5   )-----------( 236      ( 31 Aug 2018 06:11 )             42.8     09-01    136  |  97  |  12  ----------------------------<  112<H>  4.0   |  26  |  1.31<H>    Ca    9.2      01 Sep 2018 06:51  Mg     2.2     08-31      PT/INR - ( 01 Sep 2018 06:51 )   PT: 13.5 sec;   INR: 1.21          PTT - ( 01 Sep 2018 06:51 )  PTT:61.8 sec  Urinalysis Basic - ( 31 Aug 2018 13:25 )    Color: Yellow / Appearance: Clear / S.020 / pH: x  Gluc: x / Ketone: NEGATIVE  / Bili: Negative / Urobili: 1.0 E.U./dL   Blood: x / Protein: Trace mg/dL / Nitrite: NEGATIVE   Leuk Esterase: NEGATIVE / RBC: < 5 /HPF / WBC < 5 /HPF   Sq Epi: x / Non Sq Epi: 0-5 /HPF / Bacteria: Present /HPF    RADIOLOGY & ADDITIONAL STUDIES:  < from: CT Angio Chest PE Protocol w/ IV Cont (18 @ 14:19) >  IMPRESSION:   New saddle pulmonary embolism in the main pulmonary artery that continues   to right and left lobar arteries. Revisualization of chronic pulmonary   emboli. Possible minimal right heart strain. There are a few new small   wedge-shaped peripheral opacities in the left lower lobe which may   represent evolving pulmonary infarcts versus atelectasis.    < end of copied text >  < from: US Duplex Venous Lower Ext Complete, Bilateral (18 @ 10:18) >  IMPRESSION:  Extensive deep vein thrombosis throughout the veins of the right thigh   again seen as above. Partial recanalization of the proximal and mid right   femoral vein.    These findings were discussed with Dr. Yo at 12:30 on 2018.    < end of copied text >

## 2018-09-01 NOTE — PROGRESS NOTE ADULT - PROBLEM SELECTOR PLAN 3
Patient last saw his neuro oncologist Dr. Mikala Miguel on 8/20/18. As per her note, the GBM was initially diagnosed in 2017 with onset of GTC seizures. A right sided parietal mass was resected at [place] by Dr. Tucker Lai. Path showed GBM (IDH wt, MGMT methylated, EGFR amplified. He was treated by Dr. Cho at [place] with STUPP protocol - had 2 cycle sof adjuvant TMZ and was found to have radiographic progression on MRI - with a new lesion in the left parietal lobe - this was resected by Dr. Mason on 3/13/18 - pathology was GBM, IDH WT, MGMT unmethylated, and EGFR amplified. He received RT with IV Avastin - he has had a total of 7 infusions.  - Neurosurgery saw patient in ED. Cleared to start on heparin gtt.  - Will follow recommendations.  - Low threshold to scan head if there is a change in mental status

## 2018-09-01 NOTE — CONSULT NOTE ADULT - ATTENDING COMMENTS
Patient with progression of DVT/ PE    Admits has been non-compliant with medication    Discussed options. Explained could place IVC filter. Discussed risks and benefits of IVC filter vs. anticoagulation alone    Patient currently does not want IVC filter, and wants to be managed with anticoagulation. He will improve compliance    Has capacity to make this decision    Plan:  1) Anticoagulation PO  2) Hold off IVC filter for now  3) F/u with me in 1 month ([phone])

## 2018-09-01 NOTE — PROGRESS NOTE ADULT - ASSESSMENT
52 y/o M with PMHx GBM (dx 2017, mass resections x 2, chemo, radiation), PE, gout, seizure d/o, depression, anxiety, presents for dizziness, left sided neck/back pain, and substernal chest pain. He describes left sided back pain that woke him up early this morning. Admitted to [facility] for saddle embolus on CTA with mild RV strain on TTE, likely 2/2 GBM. Currently HD stable on heparin gtt.

## 2018-09-01 NOTE — PROGRESS NOTE ADULT - PROBLEM SELECTOR PLAN 1
CTA chest w/ saddle PE, due to noncompliance with eliquis, not eliquis failure  pulm svc team on board f/u recs  consult vascular re role of thrombectomy/ intraarterial tPA  - Continue heparin gtt and check serial PTT - adjust heparin according to nomogram. Currently at 11cc/hr  - Pulm following. Appreciate recs.  - vascular recs for IVC filter if DVT burden high on LE dopplers

## 2018-09-01 NOTE — PROGRESS NOTE ADULT - PROBLEM SELECTOR PLAN 2
LE Dopplers in 7/2018 showed extensive deep vein thrombosis throughout the veins of the right thigh. Patient was started on Eliquis, but has not been taking it over the last two weeks.  - f/u LE dopplers to assess clot burden. If increased, consider Vascular consult for workup  - Consider IVC filter, consult vascular surgery

## 2018-09-02 LAB
APTT BLD: 56.6 SEC — HIGH (ref 27.5–37.4)
APTT BLD: 58.1 SEC — HIGH (ref 27.5–37.4)
APTT BLD: 65.4 SEC — HIGH (ref 27.5–37.4)
BLD GP AB SCN SERPL QL: NEGATIVE — SIGNIFICANT CHANGE UP
HCT VFR BLD CALC: 38 % — LOW (ref 39–50)
HGB BLD-MCNC: 12.2 G/DL — LOW (ref 13–17)
INR BLD: 1.07 — SIGNIFICANT CHANGE UP (ref 0.88–1.16)
MCHC RBC-ENTMCNC: 28.3 PG — SIGNIFICANT CHANGE UP (ref 27–34)
MCHC RBC-ENTMCNC: 32.1 G/DL — SIGNIFICANT CHANGE UP (ref 32–36)
MCV RBC AUTO: 88.2 FL — SIGNIFICANT CHANGE UP (ref 80–100)
PLATELET # BLD AUTO: 243 K/UL — SIGNIFICANT CHANGE UP (ref 150–400)
PROTHROM AB SERPL-ACNC: 11.9 SEC — SIGNIFICANT CHANGE UP (ref 9.8–12.7)
RBC # BLD: 4.31 M/UL — SIGNIFICANT CHANGE UP (ref 4.2–5.8)
RBC # FLD: 13.4 % — SIGNIFICANT CHANGE UP (ref 10.3–16.9)
RH IG SCN BLD-IMP: NEGATIVE — SIGNIFICANT CHANGE UP
WBC # BLD: 7.1 K/UL — SIGNIFICANT CHANGE UP (ref 3.8–10.5)
WBC # FLD AUTO: 7.1 K/UL — SIGNIFICANT CHANGE UP (ref 3.8–10.5)

## 2018-09-02 PROCEDURE — 99233 SBSQ HOSP IP/OBS HIGH 50: CPT | Mod: GC

## 2018-09-02 RX ADMIN — RISPERIDONE 0.5 MILLIGRAM(S): 4 TABLET ORAL at 07:08

## 2018-09-02 RX ADMIN — LEVETIRACETAM 1000 MILLIGRAM(S): 250 TABLET, FILM COATED ORAL at 18:06

## 2018-09-02 RX ADMIN — LAMOTRIGINE 25 MILLIGRAM(S): 25 TABLET, ORALLY DISINTEGRATING ORAL at 21:36

## 2018-09-02 RX ADMIN — HYDROMORPHONE HYDROCHLORIDE 0.5 MILLIGRAM(S): 2 INJECTION INTRAMUSCULAR; INTRAVENOUS; SUBCUTANEOUS at 13:58

## 2018-09-02 RX ADMIN — SENNA PLUS 1 TABLET(S): 8.6 TABLET ORAL at 21:36

## 2018-09-02 RX ADMIN — HYDROMORPHONE HYDROCHLORIDE 0.5 MILLIGRAM(S): 2 INJECTION INTRAMUSCULAR; INTRAVENOUS; SUBCUTANEOUS at 13:37

## 2018-09-02 RX ADMIN — PANTOPRAZOLE SODIUM 40 MILLIGRAM(S): 20 TABLET, DELAYED RELEASE ORAL at 07:08

## 2018-09-02 RX ADMIN — HYDROMORPHONE HYDROCHLORIDE 0.5 MILLIGRAM(S): 2 INJECTION INTRAMUSCULAR; INTRAVENOUS; SUBCUTANEOUS at 22:18

## 2018-09-02 RX ADMIN — HEPARIN SODIUM 11 UNIT(S)/HR: 5000 INJECTION INTRAVENOUS; SUBCUTANEOUS at 09:39

## 2018-09-02 RX ADMIN — Medication 100 MILLIGRAM(S): at 21:35

## 2018-09-02 RX ADMIN — Medication 0.6 MILLIGRAM(S): at 13:37

## 2018-09-02 RX ADMIN — ARIPIPRAZOLE 15 MILLIGRAM(S): 15 TABLET ORAL at 13:38

## 2018-09-02 RX ADMIN — LAMOTRIGINE 25 MILLIGRAM(S): 25 TABLET, ORALLY DISINTEGRATING ORAL at 13:37

## 2018-09-02 RX ADMIN — HYDROMORPHONE HYDROCHLORIDE 0.5 MILLIGRAM(S): 2 INJECTION INTRAMUSCULAR; INTRAVENOUS; SUBCUTANEOUS at 22:38

## 2018-09-02 RX ADMIN — HYDROMORPHONE HYDROCHLORIDE 0.5 MILLIGRAM(S): 2 INJECTION INTRAMUSCULAR; INTRAVENOUS; SUBCUTANEOUS at 08:11

## 2018-09-02 RX ADMIN — Medication 100 MILLIGRAM(S): at 07:08

## 2018-09-02 RX ADMIN — LEVETIRACETAM 1000 MILLIGRAM(S): 250 TABLET, FILM COATED ORAL at 07:08

## 2018-09-02 RX ADMIN — Medication 100 MILLIGRAM(S): at 13:37

## 2018-09-02 RX ADMIN — HEPARIN SODIUM 13 UNIT(S)/HR: 5000 INJECTION INTRAVENOUS; SUBCUTANEOUS at 09:59

## 2018-09-02 RX ADMIN — HYDROMORPHONE HYDROCHLORIDE 0.5 MILLIGRAM(S): 2 INJECTION INTRAMUSCULAR; INTRAVENOUS; SUBCUTANEOUS at 07:57

## 2018-09-02 RX ADMIN — HYDROMORPHONE HYDROCHLORIDE 0.5 MILLIGRAM(S): 2 INJECTION INTRAMUSCULAR; INTRAVENOUS; SUBCUTANEOUS at 18:52

## 2018-09-02 RX ADMIN — RISPERIDONE 0.5 MILLIGRAM(S): 4 TABLET ORAL at 18:06

## 2018-09-02 RX ADMIN — Medication 20 MILLIGRAM(S): at 13:38

## 2018-09-02 RX ADMIN — HYDROMORPHONE HYDROCHLORIDE 0.5 MILLIGRAM(S): 2 INJECTION INTRAMUSCULAR; INTRAVENOUS; SUBCUTANEOUS at 18:06

## 2018-09-02 NOTE — PROGRESS NOTE ADULT - ATTENDING COMMENTS
Pt seen and examined at bedside. Agree with assessment and plan as above    No role for IVC filter per vascular, switch to NOAC (likely eliquis), PT eval prior to d/c home

## 2018-09-02 NOTE — PROGRESS NOTE ADULT - PROBLEM SELECTOR PLAN 2
LE Dopplers in 7/2018 showed extensive deep vein thrombosis throughout the veins of the right thigh. Patient was started on Eliquis, but has not been taking it over the last two weeks.  - Per vascular, patient not interested in IVC filter, will pursue a/c instead  - will transition to eliquis on d/c

## 2018-09-02 NOTE — PROGRESS NOTE ADULT - PROBLEM SELECTOR PLAN 3
Patient last saw his neuro oncologist Dr. Mikala Miguel on 8/20/18. As per her note, the GBM was initially diagnosed in 2017 with onset of GTC seizures. A right sided parietal mass was resected at [place] by Dr. Tucker Lai. Path showed GBM (IDH wt, MGMT methylated, EGFR amplified. He was treated by Dr. Cho at [place] with STUPP protocol - had 2 cycle sof adjuvant TMZ and was found to have radiographic progression on MRI - with a new lesion in the left parietal lobe - this was resected by Dr. Mason on 3/13/18 - pathology was GBM, IDH WT, MGMT unmethylated, and EGFR amplified. He received RT with IV Avastin - he has had a total of 7 infusions.  - Neurosurgery saw patient in ED. Cleared to start on heparin gtt.  - Will follow recommendations.  - Low threshold to scan head if there is a change in mental status

## 2018-09-02 NOTE — PROGRESS NOTE ADULT - PROBLEM SELECTOR PLAN 1
CTA chest showed new saddle pulmonary embolism in the main pulmonary artery that continues to right and left lobar arteries. Revisualization of chronic pulmonary emboli. Possible minimal right heart strain. There are a few new small wedge-shaped peripheral opacities in the left lower lobe which may represent evolving pulmonary infarcts versus atelectasis. Etiology likely secondary to malignancy and medication non-adherance. Currently with mild substernal CP at rest, no SOB or hemoptysis. VS no tachycardia or hypotension. Trops and BNP neg. TTE with mild RV strain. PESI score 95 (intermediate risk: 3.2-7.1% 30 day mortality). CT head negative for bleed, cleared by Neurosurgery and started on heparin gtt.  - Continue heparin gtt uptitrated to 13cc/hr. Will continue trending ptt.  - Pulm following. Appreciate recs.  - Per vascular, patient not interested in IVC filter, will pursue a/c instead  - will transition to eliquis on d/c

## 2018-09-02 NOTE — PROGRESS NOTE ADULT - SUBJECTIVE AND OBJECTIVE BOX
24 hr events    Subjective:    Pain:   Nausea: [ ] YES [ ] NO            Vomiting: [ ] YES [ ] NO  Abd Pain: [ ] YES [ ] NO  Diarrhea: [ ] YES [ ] NO         Constipation: [ ] YES [ ] NO     Chest Pain: [ ] YES [ ] NO    SOB:  [ ] YES [ ] NO  Flatus: [ ] YES [ ] NO  BM: [ ] YES [ ] NO  Voiding: [ ] YES [ ] NO  Weakness: [ ] YES [ ] NO  Numbness: [ ] YES [ ] NO  Extremity coldness: [ ] YES [ ] NO  Claudication: [ ] YES [ ] NO  Extremity Swelling: [ ] YES [ ] NO  Ulcers: [ ] YES [ ] NO        Vital Signs Last 24 Hrs  T(C): 36.6 (02 Sep 2018 14:35), Max: 36.8 (01 Sep 2018 22:00)  T(F): 97.8 (02 Sep 2018 14:35), Max: 98.2 (01 Sep 2018 22:00)  HR: 81 (02 Sep 2018 14:35) (81 - 81)  BP: 110/72 (02 Sep 2018 14:35) (110/72 - 113/75)  BP(mean): --  RR: 16 (02 Sep 2018 14:35) (16 - 16)  SpO2: 94% (02 Sep 2018 14:35) (93% - 94%)    I&O's Summary    02 Sep 2018 07:01  -  02 Sep 2018 14:37  --------------------------------------------------------  IN: 0 mL / OUT: 200 mL / NET: -200 mL        Physical Exam:  General: NAD, resting comfortably  Pulmonary: normal resp effort  Cardiovascular: NSR  Abdominal: soft, NT/ND  Extremities: WWP, normal strength, no clubbing/cyanosis/edema  Neuro: A/O x 3, no focal deficits, sensation normal  Pulses:   Right:                                                                  FEM [ ]2+ [ ]1+ [ ]doppler  POP [ ]2+ [ ]1+ [ ]doppler  DP [ ]2+ [ ]1+ [ ]doppler  PT[ ]2+ [ ]1+ [ ]doppler    Left:  FEM [ ]2+ [ ]1+ [ ]doppler    POP [ ]2+ [ ]1+ [ ]doppler   DP [ ]2+ [ ]1+ [ ]doppler  PT [ ]2+ [ ]1+ [ ]doppler    Lines/drains/tubes:    LABS:                        12.2   7.1   )-----------( 243      ( 02 Sep 2018 07:45 )             38.0     09-01    136  |  97  |  12  ----------------------------<  112<H>  4.0   |  26  |  1.31<H>    Ca    9.2      01 Sep 2018 06:51      PT/INR - ( 02 Sep 2018 08:08 )   PT: 11.9 sec;   INR: 1.07          PTT - ( 02 Sep 2018 08:08 )  PTT:56.6 sec      CAPILLARY BLOOD GLUCOSE          RADIOLOGY & ADDITIONAL TESTS: Subjective: Pt seen with Dr. Teixeira this AM. No acute leg pain or dyspnea. No chest pain, no palpitations.     Vital Signs Last 24 Hrs  T(C): 36.6 (02 Sep 2018 14:35), Max: 36.8 (01 Sep 2018 22:00)  T(F): 97.8 (02 Sep 2018 14:35), Max: 98.2 (01 Sep 2018 22:00)  HR: 81 (02 Sep 2018 14:35) (81 - 81)  BP: 110/72 (02 Sep 2018 14:35) (110/72 - 113/75)  BP(mean): --  RR: 16 (02 Sep 2018 14:35) (16 - 16)  SpO2: 94% (02 Sep 2018 14:35) (93% - 94%)    I&O's Summary  02 Sep 2018 07:01  -  02 Sep 2018 14:37  --------------------------------------------------------  IN: 0 mL / OUT: 200 mL / NET: -200 mL    Physical Exam:  General: NAD, resting comfortably  Pulmonary: normal resp effort  Cardiovascular: NSR  Extremities: WWP, 1+ non-pitting edema in R calf, LLE without edema  Neuro: A/O x 3, no focal deficits    LABS:                        12.2   7.1   )-----------( 243      ( 02 Sep 2018 07:45 )             38.0     09-01    136  |  97  |  12  ----------------------------<  112<H>  4.0   |  26  |  1.31<H>    Ca    9.2      01 Sep 2018 06:51      PT/INR - ( 02 Sep 2018 08:08 )   PT: 11.9 sec;   INR: 1.07          PTT - ( 02 Sep 2018 08:08 )  PTT:56.6 sec    < from: US Duplex Venous Lower Ext Complete, Bilateral (09.01.18 @ 10:18) >  IMPRESSION:  Extensive deep vein thrombosis throughout the veins of the right thigh   again seen as above. Partial recanalization of the proximal and mid right   femoral vein.

## 2018-09-02 NOTE — PROGRESS NOTE ADULT - ASSESSMENT
54 y/o M with PMHx GBM (dx 2017, mass resections x 2, chemo, radiation), PE, gout, seizure d/o, depression, anxiety, presents for dizziness, left sided neck/back pain, and substernal chest pain. He describes left sided back pain that woke him up early this morning. Admitted to [facility] for saddle embolus on CTA with mild RV strain on TTE, likely 2/2 GBM. Currently HD stable on heparin gtt.

## 2018-09-02 NOTE — PROGRESS NOTE ADULT - PROBLEM SELECTOR PLAN 4
March 21, 2017      Dakota Almeida MD  [address]  [facility] [address]           [facility] - Ophthalmology  [address]  [facility] [address]  Phone: [phone]  Fax: [phone]          Patient: Jeniffer Taylor   MR Number: [id number]   YOB: 1930   Date of Visit: 3/21/2017       Dear Dr. Dakota Almeida:    Thank you for referring Jeniffer Taylor to me for evaluation. Attached you will find relevant portions of my assessment and plan of care.    If you have questions, please do not hesitate to call me. I look forward to following Jeniffer Taylor along with you.    Sincerely,    GLADIS Hall MD    Enclosure  CC:  No Recipients    If you would like to receive this communication electronically, please contact [email or web address] or [phone] to request more information on [employer] Link access.    For providers and/or their staff who would like to refer a patient to [facility], please contact us through our one-stop-shop provider referral line, [facility], at [phone].    If you feel you have received this communication in error or would no longer like to receive these types of communications, please e-mail [email or web address]          History of seizures secondary to GBM.   - Continue keppra 1000mg BID

## 2018-09-02 NOTE — PROGRESS NOTE ADULT - ASSESSMENT
Assessment: 53y Male with a h/o GBM (s/p resection), gout, and anxiety, presents with recurrent DVT/PE found on CT and Duplex U/S after non-compliance with therapeutic anticoagulation    Recommendations:  - pt does not want IVC filter at this time, wants to be managed with anticoagulation; he will improve compliance  - Continue anticoagulation, drug choice per primary team  - follow up with Dr. Lincoln Teixeira in 1 month after discharge, call  for appointment  - will sign off, reconsult if needed, x [phone] with questions

## 2018-09-02 NOTE — PROGRESS NOTE ADULT - SUBJECTIVE AND OBJECTIVE BOX
OVERNIGHT EVENTS: ANGELITA    SUBJECTIVE / INTERVAL HPI: Patient seen and examined at bedside. Patient denies SOB, chest pain, n/v/f/c.     VITAL SIGNS:  Vital Signs Last 24 Hrs  T(C): 36.6 (02 Sep 2018 14:35), Max: 36.8 (01 Sep 2018 22:00)  T(F): 97.8 (02 Sep 2018 14:35), Max: 98.2 (01 Sep 2018 22:00)  HR: 81 (02 Sep 2018 14:35) (81 - 81)  BP: 110/72 (02 Sep 2018 14:35) (110/72 - 113/75)  BP(mean): --  RR: 16 (02 Sep 2018 14:35) (16 - 16)  SpO2: 94% (02 Sep 2018 14:35) (93% - 94%)    PHYSICAL EXAM:    General: NAD, speaking in full sentences  HEENT: PERRL, anicteric sclera; MMM  Neck: supple  Cardiovascular: +S1/S2, RRR  Respiratory: CTAB. No w/r/r.  Gastrointestinal: soft, NT/ND; +BSx4  Back: CVA tenderness on left.   Extremities: RLE edema > Left. No erythema, no cyanosis b/l  Vascular: 2+ radial, 1+ posterior tibial pulses b/l  Neurological: AAOx3; no focal deficits    MEDICATIONS:  MEDICATIONS  (STANDING):  ARIPiprazole 15 milliGRAM(s) Oral daily  colchicine 0.6 milliGRAM(s) Oral daily  docusate sodium 100 milliGRAM(s) Oral three times a day  FLUoxetine 20 milliGRAM(s) Oral daily  heparin  Infusion 1100 Unit(s)/Hr (13 mL/Hr) IV Continuous <Continuous>  lamoTRIgine 25 milliGRAM(s) Oral two times a day  levETIRAcetam 1000 milliGRAM(s) Oral two times a day  pantoprazole    Tablet 40 milliGRAM(s) Oral before breakfast  risperiDONE   Tablet 0.5 milliGRAM(s) Oral two times a day  senna 1 Tablet(s) Oral at bedtime    MEDICATIONS  (PRN):  acetaminophen   Tablet 650 milliGRAM(s) Oral every 6 hours PRN Mild Pain (1 - 3)  HYDROmorphone  Injectable 0.5 milliGRAM(s) IV Push every 4 hours PRN Moderate Pain (4 - 6)      ALLERGIES:  Allergies    No Known Allergies    Intolerances        LABS:                        12.2   7.1   )-----------( 243      ( 02 Sep 2018 07:45 )             38.0     09-01    136  |  97  |  12  ----------------------------<  112<H>  4.0   |  26  |  1.31<H>    Ca    9.2      01 Sep 2018 06:51      PT/INR - ( 02 Sep 2018 08:08 )   PT: 11.9 sec;   INR: 1.07          PTT - ( 02 Sep 2018 16:22 )  PTT:65.4 sec    CAPILLARY BLOOD GLUCOSE          RADIOLOGY & ADDITIONAL TESTS: Reviewed.

## 2018-09-02 NOTE — PROGRESS NOTE ADULT - ATTENDING COMMENTS
Agree with above.    Patient refusing IVC filter and says will improve his compliance.    Patient has capacity. This choice is in his best interests.    Will follow as outpatient.

## 2018-09-03 ENCOUNTER — TRANSCRIPTION ENCOUNTER (OUTPATIENT)
Age: 53
End: 2018-09-03

## 2018-09-03 LAB — APTT BLD: 54 SEC — HIGH (ref 27.5–37.4)

## 2018-09-03 PROCEDURE — 99233 SBSQ HOSP IP/OBS HIGH 50: CPT | Mod: GC

## 2018-09-03 RX ORDER — APIXABAN 2.5 MG/1
10 TABLET, FILM COATED ORAL ONCE
Qty: 0 | Refills: 0 | Status: COMPLETED | OUTPATIENT
Start: 2018-09-03 | End: 2018-09-03

## 2018-09-03 RX ORDER — LANOLIN ALCOHOL/MO/W.PET/CERES
3 CREAM (GRAM) TOPICAL AT BEDTIME
Qty: 0 | Refills: 0 | Status: DISCONTINUED | OUTPATIENT
Start: 2018-09-03 | End: 2018-09-08

## 2018-09-03 RX ORDER — SENNA PLUS 8.6 MG/1
1 TABLET ORAL ONCE
Qty: 0 | Refills: 0 | Status: COMPLETED | OUTPATIENT
Start: 2018-09-03 | End: 2018-09-03

## 2018-09-03 RX ORDER — HEPARIN SODIUM 5000 [USP'U]/ML
1500 INJECTION INTRAVENOUS; SUBCUTANEOUS
Qty: 25000 | Refills: 0 | Status: DISCONTINUED | OUTPATIENT
Start: 2018-09-03 | End: 2018-09-03

## 2018-09-03 RX ORDER — APIXABAN 2.5 MG/1
10 TABLET, FILM COATED ORAL EVERY 12 HOURS
Qty: 0 | Refills: 0 | Status: DISCONTINUED | OUTPATIENT
Start: 2018-09-03 | End: 2018-09-03

## 2018-09-03 RX ORDER — FLUOXETINE HCL 10 MG
60 CAPSULE ORAL EVERY 24 HOURS
Qty: 0 | Refills: 0 | Status: DISCONTINUED | OUTPATIENT
Start: 2018-09-03 | End: 2018-09-08

## 2018-09-03 RX ORDER — SENNA PLUS 8.6 MG/1
2 TABLET ORAL AT BEDTIME
Qty: 0 | Refills: 0 | Status: DISCONTINUED | OUTPATIENT
Start: 2018-09-03 | End: 2018-09-08

## 2018-09-03 RX ORDER — APIXABAN 2.5 MG/1
2 TABLET, FILM COATED ORAL
Qty: 24 | Refills: 0 | OUTPATIENT
Start: 2018-09-03 | End: 2018-09-08

## 2018-09-03 RX ORDER — APIXABAN 2.5 MG/1
10 TABLET, FILM COATED ORAL EVERY 12 HOURS
Qty: 0 | Refills: 0 | Status: DISCONTINUED | OUTPATIENT
Start: 2018-09-04 | End: 2018-09-04

## 2018-09-03 RX ORDER — POLYETHYLENE GLYCOL 3350 17 G/17G
17 POWDER, FOR SOLUTION ORAL EVERY 12 HOURS
Qty: 0 | Refills: 0 | Status: DISCONTINUED | OUTPATIENT
Start: 2018-09-03 | End: 2018-09-08

## 2018-09-03 RX ADMIN — HYDROMORPHONE HYDROCHLORIDE 0.5 MILLIGRAM(S): 2 INJECTION INTRAMUSCULAR; INTRAVENOUS; SUBCUTANEOUS at 20:35

## 2018-09-03 RX ADMIN — SENNA PLUS 2 TABLET(S): 8.6 TABLET ORAL at 21:28

## 2018-09-03 RX ADMIN — SENNA PLUS 1 TABLET(S): 8.6 TABLET ORAL at 10:03

## 2018-09-03 RX ADMIN — HYDROMORPHONE HYDROCHLORIDE 0.5 MILLIGRAM(S): 2 INJECTION INTRAMUSCULAR; INTRAVENOUS; SUBCUTANEOUS at 19:49

## 2018-09-03 RX ADMIN — Medication 100 MILLIGRAM(S): at 21:27

## 2018-09-03 RX ADMIN — LEVETIRACETAM 1000 MILLIGRAM(S): 250 TABLET, FILM COATED ORAL at 06:47

## 2018-09-03 RX ADMIN — HYDROMORPHONE HYDROCHLORIDE 0.5 MILLIGRAM(S): 2 INJECTION INTRAMUSCULAR; INTRAVENOUS; SUBCUTANEOUS at 06:49

## 2018-09-03 RX ADMIN — Medication 3 MILLIGRAM(S): at 21:27

## 2018-09-03 RX ADMIN — ARIPIPRAZOLE 15 MILLIGRAM(S): 15 TABLET ORAL at 12:14

## 2018-09-03 RX ADMIN — HYDROMORPHONE HYDROCHLORIDE 0.5 MILLIGRAM(S): 2 INJECTION INTRAMUSCULAR; INTRAVENOUS; SUBCUTANEOUS at 14:54

## 2018-09-03 RX ADMIN — HYDROMORPHONE HYDROCHLORIDE 0.5 MILLIGRAM(S): 2 INJECTION INTRAMUSCULAR; INTRAVENOUS; SUBCUTANEOUS at 07:40

## 2018-09-03 RX ADMIN — Medication 100 MILLIGRAM(S): at 06:47

## 2018-09-03 RX ADMIN — Medication 60 MILLIGRAM(S): at 17:44

## 2018-09-03 RX ADMIN — POLYETHYLENE GLYCOL 3350 17 GRAM(S): 17 POWDER, FOR SOLUTION ORAL at 10:03

## 2018-09-03 RX ADMIN — LAMOTRIGINE 25 MILLIGRAM(S): 25 TABLET, ORALLY DISINTEGRATING ORAL at 12:14

## 2018-09-03 RX ADMIN — LEVETIRACETAM 1000 MILLIGRAM(S): 250 TABLET, FILM COATED ORAL at 17:38

## 2018-09-03 RX ADMIN — PANTOPRAZOLE SODIUM 40 MILLIGRAM(S): 20 TABLET, DELAYED RELEASE ORAL at 06:47

## 2018-09-03 RX ADMIN — Medication 0.6 MILLIGRAM(S): at 12:14

## 2018-09-03 RX ADMIN — RISPERIDONE 0.5 MILLIGRAM(S): 4 TABLET ORAL at 06:47

## 2018-09-03 RX ADMIN — POLYETHYLENE GLYCOL 3350 17 GRAM(S): 17 POWDER, FOR SOLUTION ORAL at 17:38

## 2018-09-03 RX ADMIN — Medication 20 MILLIGRAM(S): at 12:14

## 2018-09-03 RX ADMIN — LAMOTRIGINE 25 MILLIGRAM(S): 25 TABLET, ORALLY DISINTEGRATING ORAL at 21:28

## 2018-09-03 RX ADMIN — HEPARIN SODIUM 13 UNIT(S)/HR: 5000 INJECTION INTRAVENOUS; SUBCUTANEOUS at 06:47

## 2018-09-03 RX ADMIN — Medication 100 MILLIGRAM(S): at 14:18

## 2018-09-03 RX ADMIN — RISPERIDONE 0.5 MILLIGRAM(S): 4 TABLET ORAL at 17:38

## 2018-09-03 RX ADMIN — HYDROMORPHONE HYDROCHLORIDE 0.5 MILLIGRAM(S): 2 INJECTION INTRAMUSCULAR; INTRAVENOUS; SUBCUTANEOUS at 14:18

## 2018-09-03 RX ADMIN — APIXABAN 10 MILLIGRAM(S): 2.5 TABLET, FILM COATED ORAL at 17:39

## 2018-09-03 NOTE — DISCHARGE NOTE ADULT - PATIENT PORTAL LINK FT
You can access the [facility] Patient Portal, offered by [facility], by registering with the following website: [email or web address]

## 2018-09-03 NOTE — PHYSICAL THERAPY INITIAL EVALUATION ADULT - GENERAL OBSERVATIONS, REHAB EVAL
patient received semi fowlers (+) PIV no apparent ditress all needs in reach friend present at bedside

## 2018-09-03 NOTE — PROGRESS NOTE ADULT - PROBLEM SELECTOR PLAN 1
CTA chest showed new saddle pulmonary embolism in the main pulmonary artery that continues to right and left lobar arteries. Revisualization of chronic pulmonary emboli. Possible minimal right heart strain. There are a few new small wedge-shaped peripheral opacities in the left lower lobe which may represent evolving pulmonary infarcts versus atelectasis. Etiology likely secondary to malignancy and medication non-adherance. Currently with mild substernal CP at rest, no SOB or hemoptysis. VS no tachycardia or hypotension. Trops and BNP neg. TTE with mild RV strain. PESI score 95 (intermediate risk: 3.2-7.1% 30 day mortality). CT head negative for bleed, cleared by Neurosurgery and started on heparin gtt.  - Continue heparin gtt uptitrated to 15cc/hr. Will continue trending ptt.  - Pulm following. Appreciate recs.  - Per vascular, patient not interested in IVC filter, will pursue a/c instead  - will transition to eliquis vs. lovenox on d/c CTA chest showed new saddle pulmonary embolism in the main pulmonary artery that continues to right and left lobar arteries. Revisualization of chronic pulmonary emboli. Possible minimal right heart strain. There are a few new small wedge-shaped peripheral opacities in the left lower lobe which may represent evolving pulmonary infarcts versus atelectasis. Etiology likely secondary to malignancy and medication non-adherance. Currently with mild substernal CP at rest, no SOB or hemoptysis. VS no tachycardia or hypotension. Trops and BNP neg. TTE with mild RV strain. PESI score 95 (intermediate risk: 3.2-7.1% 30 day mortality). CT head negative for bleed, cleared by Neurosurgery and started on heparin gtt.  - d/chi heparin. Started Eliquis 10mg BID  - Pulm following. Appreciate recs.  - Per vascular, patient not interested in IVC filter, will pursue a/c instead

## 2018-09-03 NOTE — PROGRESS NOTE ADULT - ATTENDING COMMENTS
Pt seen and examined at bedside. Agree with assessment and plan above    Saddle PE  GBM  Seizures  Gout    Pt w/ saddle PE and significant LE clot burden 2/2 to non-compliance with AC. NOT AC Failure. Indication for IVC filter is weak. Vascular team offered pt option and he is refusing currently. Will plan for discharge on eliquis. Needs HHA reinstated prior to d/c home. PT recommended home w/ home PT.

## 2018-09-03 NOTE — PHYSICAL THERAPY INITIAL EVALUATION ADULT - PLANNED THERAPY INTERVENTIONS, PT EVAL
postural re-education/strengthening/bed mobility training/stretching/transfer training/balance training/gait training

## 2018-09-03 NOTE — PHYSICAL THERAPY INITIAL EVALUATION ADULT - IMPAIRED TRANSFERS: SIT/STAND, REHAB EVAL
decreased strength/narrow base of support/verbal cues to bring the rolling walker all the way back to the bed/impaired balance/cognition

## 2018-09-03 NOTE — PROGRESS NOTE ADULT - PROBLEM SELECTOR PLAN 2
LE Dopplers in 7/2018 showed extensive deep vein thrombosis throughout the veins of the right thigh. Patient was started on Eliquis, but has not been taking it over the last two weeks.  - Per vascular, patient not interested in IVC filter, will pursue a/c instead  - will transition to eliquis vs. lovenox on d/c LE Dopplers in 7/2018 showed extensive deep vein thrombosis throughout the veins of the right thigh. Patient was started on Eliquis, but has not been taking it over the last two weeks.  - Per vascular, patient not interested in IVC filter, will pursue a/c instead  -c/w eliquis 10mg BID

## 2018-09-03 NOTE — DISCHARGE NOTE ADULT - ADDITIONAL INSTRUCTIONS
Appointment 9/25 1:40pm with Pulmonologist Dr. Arianna Orozco (In practice with Dr. Harris). 1. 9/25 1:40pm with Pulmonologist Dr. Arianna Orozco (In practice with Dr. Harris).   2. LHM with your new PCP  10/3 @ 2:45pm.

## 2018-09-03 NOTE — PHYSICAL THERAPY INITIAL EVALUATION ADULT - GAIT DEVIATIONS NOTED, PT EVAL
decreased stride length/decreased lady/decreased step length/no LOB (+) unsteadiness, verbal cues to elongate step length and widen JASVIR and turn slowly keeping feet

## 2018-09-03 NOTE — DISCHARGE NOTE ADULT - CARE PLAN
Principal Discharge DX:	Acute saddle pulmonary embolism without acute cor pulmonale  Goal:	Continue anti-coagulation  Assessment and plan of treatment:	You came to the hospital for sharp chest pain. You had a CT scan of the chest and this showed that you had a large clot in your lungs. You were started on heparin (a blood thinner) to help treat this clot. You will need to keep taking a blood thinner in order to continue your treatment outside of the hospital and prevent clots from forming in the future. Please make an appointment with your primary care doctor within 1 week of discharge from the hospital.  Secondary Diagnosis:	DVT (deep venous thrombosis)  Goal:	Continue anti-coagulation  Assessment and plan of treatment:	You had an ultrasound of the legs, which showed that you have a blood clot. Blood clots in the legs can travel to the lungs, which can be dangerous. You were started on heparin, which is a blood thinner. You will need to continue your treatment as an outpatient to prevent progression of the clot.  Secondary Diagnosis:	GBM (glioblastoma multiforme)  Goal:	Management  Secondary Diagnosis:	Seizure disorder  Goal:	Management  Secondary Diagnosis:	Transition of care performed with sharing of clinical summary  Goal:	Continuity of Care Principal Discharge DX:	Acute saddle pulmonary embolism without acute cor pulmonale  Goal:	Continue anti-coagulation  Assessment and plan of treatment:	You came to the hospital for sharp chest pain. You had a CT scan of the chest and this showed that you had a large clot in your lungs. You were started on heparin (a blood thinner) to help treat this clot. You will need to keep taking a blood thinner called Edoxaban 60mg once daily, in order to continue your treatment outside of the hospital and prevent clots from forming in the future. Please make an appointment with your primary care doctor within 1 week of discharge from the hospital to follow up on your care.  -Any new onset symptoms of HA, changes in vision, altered mental status, falls with head trauma, please go to Emergency room due to risk of bleed especially intracranial bleeds while on blood thinner.  Secondary Diagnosis:	DVT (deep venous thrombosis)  Goal:	Continue anti-coagulation  Assessment and plan of treatment:	You had an ultrasound of the legs, which showed that you have a blood clot. Blood clots in the legs can travel to the lungs, which can be dangerous. You were started on heparin, which is a blood thinner. You will need to continue your treatment as an outpatient as above, to prevent progression of the clot. In addition, an IVC filter was placed to further prevent propagation of clots from the leg up to the vasculature of the heart.  Secondary Diagnosis:	Seizure disorder  Goal:	to prevent future episodes  Assessment and plan of treatment:	Please continue with anti-seizure medications Keppra 1,000 twice daily and Lamotrigine 25 twice daily. Please follow up with your neurologist after discharge.  Goal:	Continuity of Care Principal Discharge DX:	Acute saddle pulmonary embolism without acute cor pulmonale  Goal:	Continue anti-coagulation  Assessment and plan of treatment:	You came to the hospital for sharp chest pain. You had a CT scan of the chest and this showed that you had a large clot in your lungs. You were started on heparin (a blood thinner) to help treat this clot. You will need to keep taking a blood thinner called Edoxaban 60mg once daily, in order to continue your treatment outside of the hospital and prevent clots from forming in the future.   -Please follow up with Pulmonologist Dr. Arianna Orozco on 9/25 1:40pm (who is in practice with Dr. Harris who saw you while you were in the hospital).   -Any new onset symptoms of HA, changes in vision, altered mental status, falls with head trauma, please go to Emergency room due to risk of bleed especially intracranial bleeds while on blood thinner.  Secondary Diagnosis:	DVT (deep venous thrombosis)  Goal:	Continue anti-coagulation  Assessment and plan of treatment:	You had an ultrasound of the legs, which showed that you have a blood clot. Blood clots in the legs can travel to the lungs, which can be dangerous. You were started on heparin, which is a blood thinner. You will need to continue your treatment as an outpatient as above, to prevent progression of the clot. In addition, an IVC filter was placed to further prevent propagation of clots from the leg up to the vasculature of the heart.  Secondary Diagnosis:	Seizure disorder  Goal:	to prevent future episodes  Assessment and plan of treatment:	Please continue with anti-seizure medications Keppra 1,000 twice daily and Lamotrigine 25 twice daily. Please follow up with your neurologist after discharge.  Goal:	Continuity of Care

## 2018-09-03 NOTE — PHYSICAL THERAPY INITIAL EVALUATION ADULT - ADDITIONAL COMMENTS
Patient lives alone in elevator access apartment, no reported falls, rolling walker used prior to admission patient stated he has a HHA 7 hours x7days. Tolerating 4 blocks PTA.

## 2018-09-03 NOTE — DISCHARGE NOTE ADULT - MEDICATION SUMMARY - MEDICATIONS TO TAKE
I will START or STAY ON the medications listed below when I get home from the hospital:    dexamethasone 2 mg oral tablet  -- 1 tab(s) by mouth 2 times a day  -- Indication: For Adrenal Insufficiency    edoxaban 60 mg oral tablet  -- 1 tab(s) by mouth once a day   -- Check with your doctor before becoming pregnant.  Do not take aspirin or aspirin containing products without knowledge and consent of your physician.  It is very important that you take or use this exactly as directed.  Do not skip doses or discontinue unless directed by your doctor.  Obtain medical advice before taking any non-prescription drugs as some may affect the action of this medication.    -- Indication: For DVT (deep venous thrombosis)

## 2018-09-03 NOTE — DISCHARGE NOTE ADULT - HOSPITAL COURSE
52 yo M with PMH of multifocal GBM (dx 2017, mass resections x 2, chemo, radiation), recent PE and DVT (8/27/18 with b/l PE in multiple segmental/subsegmental arteries – no right heart strain), gout, seizure d/o, depression, anxiety, presented for dizziness, left sided neck/back pain, and substernal chest pain (sharp, constant, and worse with deep breaths) without fever, chills, night sweats, palpitations, cough, hemoptysis, N/V/D, calf pain, leg swelling. Of note, occurred in setting of non-adherence to outpt Eliquis as did not know it needed to be refilled.     In the ED, T 97.1, HR 98, /76, RR 16, O2 96 on RA. CTA chest showed new saddle pulmonary embolism in the main pulmonary artery that continued to right and left lobar arteries with minimal right heart strain on echo. CT head showed no definite intracranial hemorrhage, s/p right frontoparietal and left parietal craniotomy with residual tumoral disease and/or post therapeutic changes. Patient started on heparin drip, readjusted according to PTT and heparin nomogram. Patient initially refused IVC filter placement, transitioned to Eliquis with plans to D/C on home oral AC as patient admitted he would be non-adherent to lovenox SubQ. Patient and health care proxy reconsidered IVF filter placement on 9/4, vascular reconsulted. CT A/P venogram showed DVT in common femoral vein extending into superficial femoral vein. Heparin drip dc ed before IVC filter placement which was done on 9/5 without complications. Patient restarted on heparin drip with PTT monitoring.     Pt discharged with VSS and plan for indefinite Edoxaban 60mg qd (which is preferred for pts with malignancy) with outpatient follow up with PCP. 54 yo M with PMH of multifocal GBM (dx 2017, mass resections x 2, chemo, radiation), recent PE and DVT (8/27/18 with b/l PE in multiple segmental/subsegmental arteries – no right heart strain), gout, seizure d/o, depression, anxiety, presented for dizziness, left sided neck/back pain, and substernal chest pain (sharp, constant, and worse with deep breaths) without fever, chills, night sweats, palpitations, cough, hemoptysis, N/V/D, calf pain, leg swelling. Of note, occurred in setting of non-adherence to outpt Eliquis as did not know it needed to be refilled.     In the ED, T 97.1, HR 98, /76, RR 16, O2 96 on RA. CTA chest showed new saddle pulmonary embolism in the main pulmonary artery that continued to right and left lobar arteries with minimal right heart strain on echo. CT head showed no definite intracranial hemorrhage, s/p right frontoparietal and left parietal craniotomy with residual tumoral disease and/or post therapeutic changes. Patient started on heparin drip, readjusted according to PTT and heparin nomogram. Patient initially refused IVC filter placement, transitioned to Eliquis with plans to D/C on home oral AC as patient admitted he would be non-adherent to lovenox SubQ. Patient and health care proxy reconsidered IVC filter placement on 9/4, vascular reconsulted. CT A/P venogram showed DVT in common femoral vein extending into superficial femoral vein. Heparin drip dc ed before IVC filter placement which was done on 9/5 without complications. Patient restarted on heparin drip with PTT monitoring. On day of discharge, heparin was stopped and Edoxaban was started.    Pt discharged with VSS and plan for indefinite Edoxaban 60mg qd (which is preferred for pts with malignancy) with outpatient follow up with PCP as well as appt on 9/25 1:40pm with Pulmonologist Dr. Arianna Orozco (In practice with Dr. Harris). 52 yo M with PMH of multifocal GBM (dx 2017, mass resections x 2, chemo, radiation), recent PE and DVT (8/27/18 with b/l PE in multiple segmental/subsegmental arteries – no right heart strain), gout, seizure d/o, depression, anxiety, presented for dizziness, left sided neck/back pain, and substernal chest pain (sharp, constant, and worse with deep breaths) without fever, chills, night sweats, palpitations, cough, hemoptysis, N/V/D, calf pain, leg swelling. Of note, occurred in setting of non-adherence to outpt Eliquis as did not know it needed to be refilled.     In the ED, T 97.1, HR 98, /76, RR 16, O2 96 on RA. CTA chest showed new saddle pulmonary embolism in the main pulmonary artery that continued to right and left lobar arteries with minimal right heart strain on echo. CT head showed no definite intracranial hemorrhage, s/p right frontoparietal and left parietal craniotomy with residual tumoral disease and/or post therapeutic changes. Patient started on heparin drip, readjusted according to PTT and heparin nomogram. Patient initially refused IVC filter placement, transitioned to Eliquis with plans to D/C on home oral AC as patient admitted he would be non-adherent to lovenox SubQ. Patient and health care proxy reconsidered IVC filter placement on 9/4, vascular reconsulted. CT A/P venogram showed DVT in common femoral vein extending into superficial femoral vein. Heparin drip dc ed before IVC filter placement which was done on 9/5 without complications. Patient restarted on heparin drip with PTT monitoring. On day of discharge, heparin was stopped and Edoxaban was started.    Pt discharged with VSS and plan for indefinite Edoxaban 60mg qd (which is preferred for pts with malignancy) with outpatient follow up with PCP as well as appt on 9/25 1:40pm with Pulmonologist Dr. Arianna Orozco (In practice with Dr. Harris). Pt also to follow up at [facility] with  10/3 @ 2:45pm.

## 2018-09-03 NOTE — PROGRESS NOTE ADULT - ASSESSMENT
54 y/o M with PMHx GBM (dx 2017, mass resections x 2, chemo, radiation), PE, gout, seizure d/o, depression, anxiety, presents for dizziness, left sided neck/back pain, and substernal chest pain. He describes left sided back pain that woke him up early this morning. Admitted to [facility] for saddle embolus on CTA with mild RV strain on TTE, likely 2/2 GBM. Currently HD stable on heparin gtt.

## 2018-09-03 NOTE — PROGRESS NOTE ADULT - PROBLEM SELECTOR PLAN 3
Patient last saw his neuro oncologist Dr. Mikala Miguel on 8/20/18. As per her note, the GBM was initially diagnosed in 2017 with onset of GTC seizures. A right sided parietal mass was resected at [place] by Dr. Tucker Lai. Path showed GBM (IDH wt, MGMT methylated, EGFR amplified. He was treated by Dr. Cho at [place] with STUPP protocol - had 2 cycle sof adjuvant TMZ and was found to have radiographic progression on MRI - with a new lesion in the left parietal lobe - this was resected by Dr. Mason on 3/13/18 - pathology was GBM, IDH WT, MGMT unmethylated, and EGFR amplified. He received RT with IV Avastin - he has had a total of 7 infusions.  - Neurosurgery saw patient in ED. Cleared to start on heparin gtt.  - Will follow recommendations.  - Low threshold to scan head if there is a change in mental status

## 2018-09-03 NOTE — PHYSICAL THERAPY INITIAL EVALUATION ADULT - ORIENTATION, REHAB EVAL
unable to recall month or day of the month, unable to relay his reason for admission- "there are a lot of reasons"/place/person

## 2018-09-03 NOTE — PHYSICAL THERAPY INITIAL EVALUATION ADULT - PERTINENT HX OF CURRENT PROBLEM, REHAB EVAL
54 yo male presents with dizziness, left sided neck/back pain, and substernal chest pain, found to have CTA chest showed new saddle pulmonary embolism in the main pulmonary artery that continues to right and left lobar arteries, patient declining IVC filter, seen for PT eval hospital day # 5.

## 2018-09-03 NOTE — PROGRESS NOTE ADULT - SUBJECTIVE AND OBJECTIVE BOX
OVERNIGHT EVENTS: ANGELITA    SUBJECTIVE / INTERVAL HPI: Patient seen and examined at bedside. Patient complaining of b/l leg pain, constipation, chest pain with deep inspiration. Patient denied n/v, fevers, chills, SOB.     VITAL SIGNS:  Vital Signs Last 24 Hrs  T(C): 36.7 (03 Sep 2018 07:00), Max: 36.7 (03 Sep 2018 07:00)  T(F): 98 (03 Sep 2018 07:00), Max: 98 (03 Sep 2018 07:00)  HR: 71 (03 Sep 2018 07:00) (71 - 81)  BP: 109/70 (03 Sep 2018 07:00) (109/70 - 116/79)  BP(mean): --  RR: 16 (03 Sep 2018 07:00) (16 - 16)  SpO2: 96% (03 Sep 2018 07:00) (94% - 96%)    PHYSICAL EXAM:    General: Resting comfortably in bed. NAD.  HEENT: PERRL, anicteric sclera; MMM  Neck: supple  Cardiovascular: +S1/S2, RRR  Respiratory: CTA B/L; no W/R/R  Gastrointestinal: soft, NT/ND; +BSx4  Extremities: RLE edema >LLE. No erythema, no cyanosis b/l  Vascular: 2+ radial, 1+ posterior tibial pulses b/l   Neurological: AAOx3; no focal deficits    MEDICATIONS:  MEDICATIONS  (STANDING):  ARIPiprazole 15 milliGRAM(s) Oral daily  colchicine 0.6 milliGRAM(s) Oral daily  docusate sodium 100 milliGRAM(s) Oral three times a day  FLUoxetine 20 milliGRAM(s) Oral daily  heparin  Infusion 1500 Unit(s)/Hr (15 mL/Hr) IV Continuous <Continuous>  lamoTRIgine 25 milliGRAM(s) Oral two times a day  levETIRAcetam 1000 milliGRAM(s) Oral two times a day  melatonin 3 milliGRAM(s) Oral at bedtime  pantoprazole    Tablet 40 milliGRAM(s) Oral before breakfast  polyethylene glycol 3350 17 Gram(s) Oral every 12 hours  risperiDONE   Tablet 0.5 milliGRAM(s) Oral two times a day  senna 2 Tablet(s) Oral at bedtime    MEDICATIONS  (PRN):  acetaminophen   Tablet 650 milliGRAM(s) Oral every 6 hours PRN Mild Pain (1 - 3)  HYDROmorphone  Injectable 0.5 milliGRAM(s) IV Push every 4 hours PRN Moderate Pain (4 - 6)      ALLERGIES:  Allergies    No Known Allergies    Intolerances        LABS:                        12.2   7.1   )-----------( 243      ( 02 Sep 2018 07:45 )             38.0           PT/INR - ( 02 Sep 2018 08:08 )   PT: 11.9 sec;   INR: 1.07          PTT - ( 03 Sep 2018 07:18 )  PTT:54.0 sec    CAPILLARY BLOOD GLUCOSE          RADIOLOGY & ADDITIONAL TESTS: Reviewed.

## 2018-09-03 NOTE — DISCHARGE NOTE ADULT - PLAN OF CARE
Continue anti-coagulation You came to the hospital for sharp chest pain. You had a CT scan of the chest and this showed that you had a large clot in your lungs. You were started on heparin (a blood thinner) to help treat this clot. You will need to keep taking a blood thinner in order to continue your treatment outside of the hospital and prevent clots from forming in the future. Please make an appointment with your primary care doctor within 1 week of discharge from the hospital. You had an ultrasound of the legs, which showed that you have a blood clot. Blood clots in the legs can travel to the lungs, which can be dangerous. You were started on heparin, which is a blood thinner. You will need to continue your treatment as an outpatient to prevent progression of the clot. Management Continuity of Care You came to the hospital for sharp chest pain. You had a CT scan of the chest and this showed that you had a large clot in your lungs. You were started on heparin (a blood thinner) to help treat this clot. You will need to keep taking a blood thinner called Edoxaban 60mg once daily, in order to continue your treatment outside of the hospital and prevent clots from forming in the future. Please make an appointment with your primary care doctor within 1 week of discharge from the hospital to follow up on your care.  -Any new onset symptoms of HA, changes in vision, altered mental status, falls with head trauma, please go to Emergency room due to risk of bleed especially intracranial bleeds while on blood thinner. You had an ultrasound of the legs, which showed that you have a blood clot. Blood clots in the legs can travel to the lungs, which can be dangerous. You were started on heparin, which is a blood thinner. You will need to continue your treatment as an outpatient as above, to prevent progression of the clot. In addition, an IVC filter was placed to further prevent propagation of clots from the leg up to the vasculature of the heart. to prevent future episodes Please continue with anti-seizure medications Keppra 1,000 twice daily and Lamotrigine 25 twice daily. Please follow up with your neurologist after discharge. You came to the hospital for sharp chest pain. You had a CT scan of the chest and this showed that you had a large clot in your lungs. You were started on heparin (a blood thinner) to help treat this clot. You will need to keep taking a blood thinner called Edoxaban 60mg once daily, in order to continue your treatment outside of the hospital and prevent clots from forming in the future.   -Please follow up with Pulmonologist Dr. Arianna Orozco on 9/25 1:40pm (who is in practice with Dr. Harris who saw you while you were in the hospital).   -Any new onset symptoms of HA, changes in vision, altered mental status, falls with head trauma, please go to Emergency room due to risk of bleed especially intracranial bleeds while on blood thinner.

## 2018-09-04 LAB
ANION GAP SERPL CALC-SCNC: 13 MMOL/L — SIGNIFICANT CHANGE UP (ref 5–17)
APTT BLD: 33.5 SEC — SIGNIFICANT CHANGE UP (ref 27.5–37.4)
BUN SERPL-MCNC: 12 MG/DL — SIGNIFICANT CHANGE UP (ref 7–23)
CALCIUM SERPL-MCNC: 9.5 MG/DL — SIGNIFICANT CHANGE UP (ref 8.4–10.5)
CHLORIDE SERPL-SCNC: 100 MMOL/L — SIGNIFICANT CHANGE UP (ref 96–108)
CO2 SERPL-SCNC: 29 MMOL/L — SIGNIFICANT CHANGE UP (ref 22–31)
CREAT SERPL-MCNC: 1.14 MG/DL — SIGNIFICANT CHANGE UP (ref 0.5–1.3)
GLUCOSE SERPL-MCNC: 125 MG/DL — HIGH (ref 70–99)
HCT VFR BLD CALC: 39.3 % — SIGNIFICANT CHANGE UP (ref 39–50)
HGB BLD-MCNC: 12.4 G/DL — LOW (ref 13–17)
INR BLD: 1.39 — HIGH (ref 0.88–1.16)
MAGNESIUM SERPL-MCNC: 1.9 MG/DL — SIGNIFICANT CHANGE UP (ref 1.6–2.6)
MCHC RBC-ENTMCNC: 28.2 PG — SIGNIFICANT CHANGE UP (ref 27–34)
MCHC RBC-ENTMCNC: 31.6 G/DL — LOW (ref 32–36)
MCV RBC AUTO: 89.5 FL — SIGNIFICANT CHANGE UP (ref 80–100)
PHOSPHATE SERPL-MCNC: 3.6 MG/DL — SIGNIFICANT CHANGE UP (ref 2.5–4.5)
PLATELET # BLD AUTO: 239 K/UL — SIGNIFICANT CHANGE UP (ref 150–400)
POTASSIUM SERPL-MCNC: 4.1 MMOL/L — SIGNIFICANT CHANGE UP (ref 3.5–5.3)
POTASSIUM SERPL-SCNC: 4.1 MMOL/L — SIGNIFICANT CHANGE UP (ref 3.5–5.3)
PROTHROM AB SERPL-ACNC: 15.5 SEC — HIGH (ref 9.8–12.7)
RBC # BLD: 4.39 M/UL — SIGNIFICANT CHANGE UP (ref 4.2–5.8)
RBC # FLD: 13.5 % — SIGNIFICANT CHANGE UP (ref 10.3–16.9)
SODIUM SERPL-SCNC: 142 MMOL/L — SIGNIFICANT CHANGE UP (ref 135–145)
WBC # BLD: 5.9 K/UL — SIGNIFICANT CHANGE UP (ref 3.8–10.5)
WBC # FLD AUTO: 5.9 K/UL — SIGNIFICANT CHANGE UP (ref 3.8–10.5)

## 2018-09-04 PROCEDURE — 93010 ELECTROCARDIOGRAM REPORT: CPT

## 2018-09-04 PROCEDURE — 71045 X-RAY EXAM CHEST 1 VIEW: CPT | Mod: 26

## 2018-09-04 PROCEDURE — 99233 SBSQ HOSP IP/OBS HIGH 50: CPT

## 2018-09-04 RX ORDER — HEPARIN SODIUM 5000 [USP'U]/ML
1300 INJECTION INTRAVENOUS; SUBCUTANEOUS
Qty: 25000 | Refills: 0 | Status: DISCONTINUED | OUTPATIENT
Start: 2018-09-04 | End: 2018-09-07

## 2018-09-04 RX ORDER — HEPARIN SODIUM 5000 [USP'U]/ML
1300 INJECTION INTRAVENOUS; SUBCUTANEOUS
Qty: 25000 | Refills: 0 | Status: DISCONTINUED | OUTPATIENT
Start: 2018-09-04 | End: 2018-09-04

## 2018-09-04 RX ORDER — SODIUM CHLORIDE 9 MG/ML
1000 INJECTION INTRAMUSCULAR; INTRAVENOUS; SUBCUTANEOUS
Qty: 0 | Refills: 0 | Status: DISCONTINUED | OUTPATIENT
Start: 2018-09-04 | End: 2018-09-07

## 2018-09-04 RX ORDER — HYDROMORPHONE HYDROCHLORIDE 2 MG/ML
2 INJECTION INTRAMUSCULAR; INTRAVENOUS; SUBCUTANEOUS EVERY 4 HOURS
Qty: 0 | Refills: 0 | Status: DISCONTINUED | OUTPATIENT
Start: 2018-09-04 | End: 2018-09-08

## 2018-09-04 RX ADMIN — Medication 100 MILLIGRAM(S): at 22:37

## 2018-09-04 RX ADMIN — Medication 0.6 MILLIGRAM(S): at 12:06

## 2018-09-04 RX ADMIN — SENNA PLUS 2 TABLET(S): 8.6 TABLET ORAL at 22:37

## 2018-09-04 RX ADMIN — Medication 60 MILLIGRAM(S): at 12:12

## 2018-09-04 RX ADMIN — APIXABAN 10 MILLIGRAM(S): 2.5 TABLET, FILM COATED ORAL at 07:28

## 2018-09-04 RX ADMIN — HEPARIN SODIUM 13 UNIT(S)/HR: 5000 INJECTION INTRAVENOUS; SUBCUTANEOUS at 19:13

## 2018-09-04 RX ADMIN — HEPARIN SODIUM 13 UNIT(S)/HR: 5000 INJECTION INTRAVENOUS; SUBCUTANEOUS at 12:19

## 2018-09-04 RX ADMIN — LAMOTRIGINE 25 MILLIGRAM(S): 25 TABLET, ORALLY DISINTEGRATING ORAL at 12:06

## 2018-09-04 RX ADMIN — LEVETIRACETAM 1000 MILLIGRAM(S): 250 TABLET, FILM COATED ORAL at 07:28

## 2018-09-04 RX ADMIN — LEVETIRACETAM 1000 MILLIGRAM(S): 250 TABLET, FILM COATED ORAL at 17:56

## 2018-09-04 RX ADMIN — Medication 3 MILLIGRAM(S): at 22:37

## 2018-09-04 RX ADMIN — HYDROMORPHONE HYDROCHLORIDE 0.5 MILLIGRAM(S): 2 INJECTION INTRAMUSCULAR; INTRAVENOUS; SUBCUTANEOUS at 08:57

## 2018-09-04 RX ADMIN — ARIPIPRAZOLE 15 MILLIGRAM(S): 15 TABLET ORAL at 12:06

## 2018-09-04 RX ADMIN — Medication 100 MILLIGRAM(S): at 07:28

## 2018-09-04 RX ADMIN — HYDROMORPHONE HYDROCHLORIDE 0.5 MILLIGRAM(S): 2 INJECTION INTRAMUSCULAR; INTRAVENOUS; SUBCUTANEOUS at 12:05

## 2018-09-04 RX ADMIN — SODIUM CHLORIDE 80 MILLILITER(S): 9 INJECTION INTRAMUSCULAR; INTRAVENOUS; SUBCUTANEOUS at 16:45

## 2018-09-04 RX ADMIN — PANTOPRAZOLE SODIUM 40 MILLIGRAM(S): 20 TABLET, DELAYED RELEASE ORAL at 07:28

## 2018-09-04 RX ADMIN — RISPERIDONE 0.5 MILLIGRAM(S): 4 TABLET ORAL at 07:29

## 2018-09-04 RX ADMIN — RISPERIDONE 0.5 MILLIGRAM(S): 4 TABLET ORAL at 17:56

## 2018-09-04 RX ADMIN — HYDROMORPHONE HYDROCHLORIDE 0.5 MILLIGRAM(S): 2 INJECTION INTRAMUSCULAR; INTRAVENOUS; SUBCUTANEOUS at 12:45

## 2018-09-04 RX ADMIN — HYDROMORPHONE HYDROCHLORIDE 2 MILLIGRAM(S): 2 INJECTION INTRAMUSCULAR; INTRAVENOUS; SUBCUTANEOUS at 22:00

## 2018-09-04 RX ADMIN — HYDROMORPHONE HYDROCHLORIDE 0.5 MILLIGRAM(S): 2 INJECTION INTRAMUSCULAR; INTRAVENOUS; SUBCUTANEOUS at 07:28

## 2018-09-04 RX ADMIN — POLYETHYLENE GLYCOL 3350 17 GRAM(S): 17 POWDER, FOR SOLUTION ORAL at 17:57

## 2018-09-04 RX ADMIN — POLYETHYLENE GLYCOL 3350 17 GRAM(S): 17 POWDER, FOR SOLUTION ORAL at 07:28

## 2018-09-04 RX ADMIN — HYDROMORPHONE HYDROCHLORIDE 0.5 MILLIGRAM(S): 2 INJECTION INTRAMUSCULAR; INTRAVENOUS; SUBCUTANEOUS at 17:00

## 2018-09-04 RX ADMIN — HYDROMORPHONE HYDROCHLORIDE 0.5 MILLIGRAM(S): 2 INJECTION INTRAMUSCULAR; INTRAVENOUS; SUBCUTANEOUS at 17:50

## 2018-09-04 RX ADMIN — HYDROMORPHONE HYDROCHLORIDE 2 MILLIGRAM(S): 2 INJECTION INTRAMUSCULAR; INTRAVENOUS; SUBCUTANEOUS at 21:09

## 2018-09-04 NOTE — PROGRESS NOTE ADULT - PROBLEM SELECTOR PLAN 6
F: No IVF  E: replete lytes prn.  N: regular diet. F: IV NS @80cc/hr given NPO. Can d/c afterwards  E: replete lytes prn.  N: NPO for venogram. Resume regular diet afterwards

## 2018-09-04 NOTE — PROGRESS NOTE ADULT - PROBLEM SELECTOR PLAN 3
Patient last saw his neuro oncologist Dr. Mikala Miguel on 8/20/18. As per her note, the GBM was initially diagnosed in 2017 with onset of GTC seizures. A right sided parietal mass was resected at [place] by Dr. Tucker Lai. Path showed GBM (IDH wt, MGMT methylated, EGFR amplified. He was treated by Dr. Cho at [place] with STUPP protocol - had 2 cycle sof adjuvant TMZ and was found to have radiographic progression on MRI - with a new lesion in the left parietal lobe - this was resected by Dr. Mason on 3/13/18 - pathology was GBM, IDH WT, MGMT unmethylated, and EGFR amplified. He received RT with IV Avastin - he has had a total of 7 infusions.  - Neurosurgery saw patient in ED. Cleared to start on heparin gtt.  - Will follow recommendations.  - Low threshold to scan head if there is a change in mental status

## 2018-09-04 NOTE — DIETITIAN INITIAL EVALUATION ADULT. - OTHER INFO
54 y/o M with PMHx GBM (dx 2017, mass resections x 2, chemo, radiation), PE, gout, seizure d/o, depression, anxiety, presents for dizziness, left sided neck/back pain, and substernal chest pain; found to have an Acute saddle pulmonary embolism. Pt tolerating 54 y/o M with PMHx GBM (dx 2017, mass resections x 2, chemo, radiation), PE, gout, seizure d/o, depression, anxiety, presents for dizziness, left sided neck/back pain, and substernal chest pain; found to have an Acute saddle pulmonary embolism. Pt tolerating diet with good PO intake - reports he follows a healthy diet at home. No n/v/d/c noted as per pt. Pt denies any recent wt changes. NKFA. Skin: intact. Denies any pain.

## 2018-09-04 NOTE — DIETITIAN INITIAL EVALUATION ADULT. - PROBLEM SELECTOR PLAN 2
LE Dopplers in 7/2018 showed extensive deep vein thrombosis throughout the veins of the right thigh. Patient was started on Eliquis, but has not been taking it over the last two weeks.  - Check LE dopplers for increased clot burden.  - If increased, consider Vascular consult for workup. Consider IVC filter.

## 2018-09-04 NOTE — PROGRESS NOTE ADULT - PROBLEM SELECTOR PLAN 2
LE Dopplers in 7/2018 showed extensive deep vein thrombosis throughout the veins of the right thigh. Patient was started on Eliquis, but has not been taking it over the last two weeks.  -health care proxy and patient now agreeing to have IVF filter placed. Vascular re-consulted, recs appreciated  -f/u vascular surgery recs  -f/u CT Abdomen/Pelvis venogram - NPO for venogram  -Patient scheduled for IVF filter placement on Thursday - NPO after midnight Wednesday  -restarted heparin gtt @ 13cc/hr as patient scheduled for IVF filter on Thursday  -f/u PTT, adjust according to nomogram

## 2018-09-04 NOTE — PROGRESS NOTE ADULT - ATTENDING COMMENTS
DX  SADDLE PE AND RLE DVT - now on heparin gtt. vascular sgy consulted for ivc filter placement. planned for venogram tomorrow.   GBM - case d/w neurosurgery, can follow up with them as outpt  SEIZURES - cont keppra  COGNITIVE IMPAIRMENT- needs assistance with medications

## 2018-09-04 NOTE — DIETITIAN INITIAL EVALUATION ADULT. - ENERGY NEEDS
Height: 78" Weight: 180lbs, IBW 214lbs+/-10%, %IBW 84%, BMI - 20.9  ABW used to calculate energy needs due to pt's current body weight within % IBW   Nutrient needs based on [facility] standards of care for maintenance in adults.

## 2018-09-04 NOTE — PROGRESS NOTE ADULT - ASSESSMENT
54 y/o M with PMHx GBM (dx 2017, mass resections x 2, chemo, radiation), PE, gout, seizure d/o, depression, anxiety, presents for dizziness, left sided neck/back pain, and substernal chest pain. He describes left sided back pain that woke him up early this morning. Admitted to [facility] for saddle embolus on CTA with mild RV strain on TTE, likely 2/2 GBM. Currently HD stable on heparin gtt.

## 2018-09-04 NOTE — DIETITIAN INITIAL EVALUATION ADULT. - PROBLEM SELECTOR PLAN 8
1) PCP Contacted on Admission: no PMD. Under care of Dr. Mason - Neurosurgery [phone] and Dr. Mikala Miguel - Neuro Onc [phone]  2) Date of Contact with PCP: NA  3) PCP Contacted at Discharge: TBD  4) Summary of Handoff Given to PCP: TBD  5) Post-Discharge Appointment Date and Location: TBD

## 2018-09-04 NOTE — DIETITIAN INITIAL EVALUATION ADULT. - PROBLEM SELECTOR PLAN 1
CTA chest showed new saddle pulmonary embolism in the main pulmonary artery that continues to right and left lobar arteries. Revisualization of chronic pulmonary emboli. Possible minimal right heart strain. There are a few new small wedge-shaped peripheral opacities in the left lower lobe which may represent evolving pulmonary infarcts versus atelectasis. Etiology likely secondary to malignancy and medication non-adherance. Currently with mild substernal CP at rest, no SOB or hemoptysis. VS no tachycardia or hypotension. Trops and BNP neg. TTE with mild RV strain. PESI score 95 (intermediate risk: 3.2-7.1% 30 day mortality). CT head negative for bleed, cleared by Neurosurgery and started on heparin gtt.  - Continue heparin gtt and check serial PTT.  - Pulm following. Appreciate recs.

## 2018-09-04 NOTE — DIETITIAN INITIAL EVALUATION ADULT. - PROBLEM SELECTOR PLAN 3
Patient last saw his neuro oncologist Dr. Mikala Miguel on 8/20/18. As per her note, the GBM was initially diagnosed in 2017 with onset of GTC seizures. A right sided parietal mass was resected at [place] by Dr. Tucker Lai. Path showed GBM (IDH wt, MGMT methylated, EGFR amplified. He was treated by Dr. Cho at [place] with STUPP protocol - had 2 cycle sof adjuvant TMZ and was found to have radiographic progression on MRI - with a new lesion in the left parietal lobe - this was resected by Dr. Mason on 3/13/18 - pathology was GBM, IDH WT, MGMT unmethylated, and EGFR amplified. He received RT with IV Avastin - he has had a total of 7 infusions.  - Neurosurgery saw patient in ED. Cleared to start on heparin gtt.  - Will follow recommendations.  - Low threshold to scan head if there is a change in mental status.

## 2018-09-04 NOTE — PROGRESS NOTE ADULT - PROBLEM SELECTOR PLAN 1
CTA chest showed new saddle pulmonary embolism in the main pulmonary artery that continues to right and left lobar arteries. Revisualization of chronic pulmonary emboli. Possible minimal right heart strain. There are a few new small wedge-shaped peripheral opacities in the left lower lobe which may represent evolving pulmonary infarcts versus atelectasis. Etiology likely secondary to malignancy and medication non-adherance. Currently with mild substernal CP at rest, no SOB or hemoptysis. VS no tachycardia or hypotension. Trops and BNP neg. TTE with mild RV strain. PESI score 95 (intermediate risk: 3.2-7.1% 30 day mortality). CT head negative for bleed, cleared by Neurosurgery and started on heparin gtt.  -health care proxy and patient now agreeing to have IVF filter placed. Vascular re-consulted, recs appreciated  -f/u vascular surgery recs  -f/u CT Abdomen/Pelvis venogram - NPO for venogram  -Patient scheduled for IVF filter placement on Thursday - NPO after midnight Wednesday  -restarted heparin gtt @ 13cc/hr as patient scheduled for IVF filter on Thursday   -f/u PTT, adjust according to nomogram  - Pulm following. Appreciate recs.  -plan to go home after IVC filter with Eliquis at moment. Patient without ability or support to administer Lovenox

## 2018-09-04 NOTE — PROGRESS NOTE ADULT - SUBJECTIVE AND OBJECTIVE BOX
OVERNIGHT EVENTS: ANGELITA    SUBJECTIVE / INTERVAL HPI: Patient seen and examined at bedside. Complaining of chest pain, worse with breathing. EKG and STAT CXR done - EKG with no acute changes, CXR with b/l bibasilar atelectasis. Admits to sore throat. Denies fevers, chills, night sweats, cough, rhinorrhea, SOB, n/v/d/c.     VITAL SIGNS:  Vital Signs Last 24 Hrs  T(C): 37.2 (04 Sep 2018 09:37), Max: 37.2 (04 Sep 2018 09:37)  T(F): 98.9 (04 Sep 2018 09:37), Max: 98.9 (04 Sep 2018 09:37)  HR: 84 (04 Sep 2018 09:37) (72 - 84)  BP: 117/85 (04 Sep 2018 09:37) (116/78 - 127/87)  BP(mean): 96 (04 Sep 2018 09:37) (96 - 96)  RR: 16 (04 Sep 2018 09:37) (16 - 16)  SpO2: 95% (04 Sep 2018 09:37) (95% - 95%)    PHYSICAL EXAM:    General: Resting comfortably in bed. NAD.  HEENT: PERRL, anicteric sclera; MMM  Neck: supple  Cardiovascular: +S1/S2, RRR  Respiratory: CTA B/L; no W/R/R  Gastrointestinal: soft, NT/ND; +BSx4  Extremities: RLE edema >LLE. No erythema, no cyanosis b/l  Vascular: 2+ radial, 1+ posterior tibial pulses b/l   Neurological: AAOx3; no focal deficits    MEDICATIONS:  MEDICATIONS  (STANDING):  ARIPiprazole 15 milliGRAM(s) Oral daily  colchicine 0.6 milliGRAM(s) Oral daily  docusate sodium 100 milliGRAM(s) Oral three times a day  FLUoxetine 60 milliGRAM(s) Oral every 24 hours  heparin  Infusion 1300 Unit(s)/Hr (13 mL/Hr) IV Continuous <Continuous>  lamoTRIgine 25 milliGRAM(s) Oral two times a day  levETIRAcetam 1000 milliGRAM(s) Oral two times a day  melatonin 3 milliGRAM(s) Oral at bedtime  pantoprazole    Tablet 40 milliGRAM(s) Oral before breakfast  polyethylene glycol 3350 17 Gram(s) Oral every 12 hours  risperiDONE   Tablet 0.5 milliGRAM(s) Oral two times a day  senna 2 Tablet(s) Oral at bedtime  sodium chloride 0.9%. 1000 milliLiter(s) (80 mL/Hr) IV Continuous <Continuous>    MEDICATIONS  (PRN):  acetaminophen   Tablet 650 milliGRAM(s) Oral every 6 hours PRN Mild Pain (1 - 3)  HYDROmorphone   Tablet 2 milliGRAM(s) Oral every 4 hours PRN Moderate Pain (4 - 6)      ALLERGIES:  Allergies    No Known Allergies    Intolerances        LABS:                        12.4   5.9   )-----------( 239      ( 04 Sep 2018 12:33 )             39.3     09-04    142  |  100  |  12  ----------------------------<  125<H>  4.1   |  29  |  1.14    Ca    9.5      04 Sep 2018 12:33  Phos  3.6     09-04  Mg     1.9     09-04      PT/INR - ( 04 Sep 2018 12:32 )   PT: 15.5 sec;   INR: 1.39          PTT - ( 04 Sep 2018 12:32 )  PTT:33.5 sec    CAPILLARY BLOOD GLUCOSE          RADIOLOGY & ADDITIONAL TESTS: Reviewed. Hospital Course   52 y/o M with PMHx of multifocal GBM (dx 2017, mass resections x 2, chemo, radiation), recent PE and DVT (8/27/18 with b/l PE in multiple segmental/subsegmental arteries – no right heart strain), gout, seizure d/o, depression, anxiety, presented for dizziness, left sided neck/back pain, and substernal chest pain (sharp, constant, and worse with deep breaths) without fever, chills, night sweats, palpitations, cough, hemoptysis, N/V/D, calf pain, leg swelling. Of note, occurred in setting of non-adherence to outpt Eliquis as did not know it needed to be refilled. In the ED, T 97.1, HR 98, /76, RR 16, O2 96 on RA. CTA chest showed new saddle pulmonary embolism in the main pulmonary artery that continued to right and left lobar arteries with minimal right heart strain on echo. CT head showed no definite intracranial hemorrhage, s/p right frontoparietal and left parietal craniotomy with residual tumoral disease and/or post therapeutic changes. Patient started on heparin drip, readjusted according to PTT and heparin nomogram. IVC filter considered given RLE DVT burden demonstrated on LE dopplers. Vascular Surgery consulted. Patient initially refused IVC filter placement, transitioned to Eliquis with plans to D/C on home oral AC as patient admitted he would be non-adherent to lovenox SubQ. Patient and health care proxy reconsidered IVF filter placement on 9/4, vascular reconsulted – plan for CT A/P venogram and IVC filter placement on Thursday. D/chi eliquis, Patient restarted on heparin drip, and will follow up PTT with readjustments per heparin nomogram.     OVERNIGHT EVENTS: ANGELITA    SUBJECTIVE / INTERVAL HPI: Patient seen and examined at bedside. Complaining of chest pain, worse with breathing. EKG and STAT CXR done - EKG with no acute changes, CXR with b/l bibasilar atelectasis. Admits to sore throat. Denies fevers, chills, night sweats, cough, rhinorrhea, SOB, n/v/d/c.     VITAL SIGNS:  Vital Signs Last 24 Hrs  T(C): 37.2 (04 Sep 2018 09:37), Max: 37.2 (04 Sep 2018 09:37)  T(F): 98.9 (04 Sep 2018 09:37), Max: 98.9 (04 Sep 2018 09:37)  HR: 84 (04 Sep 2018 09:37) (72 - 84)  BP: 117/85 (04 Sep 2018 09:37) (116/78 - 127/87)  BP(mean): 96 (04 Sep 2018 09:37) (96 - 96)  RR: 16 (04 Sep 2018 09:37) (16 - 16)  SpO2: 95% (04 Sep 2018 09:37) (95% - 95%)    PHYSICAL EXAM:    General: Resting comfortably in bed. NAD.  HEENT: PERRL, anicteric sclera; MMM  Neck: supple  Cardiovascular: +S1/S2, RRR  Respiratory: CTA B/L; no W/R/R  Gastrointestinal: soft, NT/ND; +BSx4  Extremities: RLE edema >LLE. No erythema, no cyanosis b/l  Vascular: 2+ radial, 1+ posterior tibial pulses b/l   Neurological: AAOx3; no focal deficits    MEDICATIONS:  MEDICATIONS  (STANDING):  ARIPiprazole 15 milliGRAM(s) Oral daily  colchicine 0.6 milliGRAM(s) Oral daily  docusate sodium 100 milliGRAM(s) Oral three times a day  FLUoxetine 60 milliGRAM(s) Oral every 24 hours  heparin  Infusion 1300 Unit(s)/Hr (13 mL/Hr) IV Continuous <Continuous>  lamoTRIgine 25 milliGRAM(s) Oral two times a day  levETIRAcetam 1000 milliGRAM(s) Oral two times a day  melatonin 3 milliGRAM(s) Oral at bedtime  pantoprazole    Tablet 40 milliGRAM(s) Oral before breakfast  polyethylene glycol 3350 17 Gram(s) Oral every 12 hours  risperiDONE   Tablet 0.5 milliGRAM(s) Oral two times a day  senna 2 Tablet(s) Oral at bedtime  sodium chloride 0.9%. 1000 milliLiter(s) (80 mL/Hr) IV Continuous <Continuous>    MEDICATIONS  (PRN):  acetaminophen   Tablet 650 milliGRAM(s) Oral every 6 hours PRN Mild Pain (1 - 3)  HYDROmorphone   Tablet 2 milliGRAM(s) Oral every 4 hours PRN Moderate Pain (4 - 6)      ALLERGIES:  Allergies    No Known Allergies    Intolerances        LABS:                        12.4   5.9   )-----------( 239      ( 04 Sep 2018 12:33 )             39.3     09-04    142  |  100  |  12  ----------------------------<  125<H>  4.1   |  29  |  1.14    Ca    9.5      04 Sep 2018 12:33  Phos  3.6     09-04  Mg     1.9     09-04      PT/INR - ( 04 Sep 2018 12:32 )   PT: 15.5 sec;   INR: 1.39          PTT - ( 04 Sep 2018 12:32 )  PTT:33.5 sec    CAPILLARY BLOOD GLUCOSE          RADIOLOGY & ADDITIONAL TESTS: Reviewed.

## 2018-09-05 LAB
ANION GAP SERPL CALC-SCNC: 10 MMOL/L — SIGNIFICANT CHANGE UP (ref 5–17)
APTT BLD: 56.8 SEC — HIGH (ref 27.5–37.4)
APTT BLD: 60.6 SEC — HIGH (ref 27.5–37.4)
APTT BLD: 90.2 SEC — HIGH (ref 27.5–37.4)
APTT BLD: >200 SEC — CRITICAL HIGH (ref 27.5–37.4)
BLD GP AB SCN SERPL QL: NEGATIVE — SIGNIFICANT CHANGE UP
BUN SERPL-MCNC: 13 MG/DL — SIGNIFICANT CHANGE UP (ref 7–23)
CALCIUM SERPL-MCNC: 9.4 MG/DL — SIGNIFICANT CHANGE UP (ref 8.4–10.5)
CHLORIDE SERPL-SCNC: 99 MMOL/L — SIGNIFICANT CHANGE UP (ref 96–108)
CO2 SERPL-SCNC: 30 MMOL/L — SIGNIFICANT CHANGE UP (ref 22–31)
CREAT SERPL-MCNC: 1.23 MG/DL — SIGNIFICANT CHANGE UP (ref 0.5–1.3)
GLUCOSE SERPL-MCNC: 109 MG/DL — HIGH (ref 70–99)
HCT VFR BLD CALC: 37.2 % — LOW (ref 39–50)
HGB BLD-MCNC: 11.6 G/DL — LOW (ref 13–17)
INR BLD: 1.12 — SIGNIFICANT CHANGE UP (ref 0.88–1.16)
MAGNESIUM SERPL-MCNC: 1.9 MG/DL — SIGNIFICANT CHANGE UP (ref 1.6–2.6)
MCHC RBC-ENTMCNC: 28.1 PG — SIGNIFICANT CHANGE UP (ref 27–34)
MCHC RBC-ENTMCNC: 31.2 G/DL — LOW (ref 32–36)
MCV RBC AUTO: 90.1 FL — SIGNIFICANT CHANGE UP (ref 80–100)
PHOSPHATE SERPL-MCNC: 3.9 MG/DL — SIGNIFICANT CHANGE UP (ref 2.5–4.5)
PLATELET # BLD AUTO: 251 K/UL — SIGNIFICANT CHANGE UP (ref 150–400)
POTASSIUM SERPL-MCNC: 4 MMOL/L — SIGNIFICANT CHANGE UP (ref 3.5–5.3)
POTASSIUM SERPL-SCNC: 4 MMOL/L — SIGNIFICANT CHANGE UP (ref 3.5–5.3)
PROTHROM AB SERPL-ACNC: 12.5 SEC — SIGNIFICANT CHANGE UP (ref 9.8–12.7)
RBC # BLD: 4.13 M/UL — LOW (ref 4.2–5.8)
RBC # FLD: 13.6 % — SIGNIFICANT CHANGE UP (ref 10.3–16.9)
RH IG SCN BLD-IMP: NEGATIVE — SIGNIFICANT CHANGE UP
SODIUM SERPL-SCNC: 139 MMOL/L — SIGNIFICANT CHANGE UP (ref 135–145)
WBC # BLD: 6.3 K/UL — SIGNIFICANT CHANGE UP (ref 3.8–10.5)
WBC # FLD AUTO: 6.3 K/UL — SIGNIFICANT CHANGE UP (ref 3.8–10.5)

## 2018-09-05 PROCEDURE — 74174 CTA ABD&PLVS W/CONTRAST: CPT | Mod: 26

## 2018-09-05 PROCEDURE — 99233 SBSQ HOSP IP/OBS HIGH 50: CPT

## 2018-09-05 RX ADMIN — HYDROMORPHONE HYDROCHLORIDE 2 MILLIGRAM(S): 2 INJECTION INTRAMUSCULAR; INTRAVENOUS; SUBCUTANEOUS at 19:12

## 2018-09-05 RX ADMIN — ARIPIPRAZOLE 15 MILLIGRAM(S): 15 TABLET ORAL at 12:38

## 2018-09-05 RX ADMIN — HEPARIN SODIUM 15 UNIT(S)/HR: 5000 INJECTION INTRAVENOUS; SUBCUTANEOUS at 22:41

## 2018-09-05 RX ADMIN — Medication 60 MILLIGRAM(S): at 12:38

## 2018-09-05 RX ADMIN — RISPERIDONE 0.5 MILLIGRAM(S): 4 TABLET ORAL at 06:50

## 2018-09-05 RX ADMIN — Medication 100 MILLIGRAM(S): at 14:31

## 2018-09-05 RX ADMIN — HYDROMORPHONE HYDROCHLORIDE 2 MILLIGRAM(S): 2 INJECTION INTRAMUSCULAR; INTRAVENOUS; SUBCUTANEOUS at 18:29

## 2018-09-05 RX ADMIN — Medication 100 MILLIGRAM(S): at 06:50

## 2018-09-05 RX ADMIN — SODIUM CHLORIDE 80 MILLILITER(S): 9 INJECTION INTRAMUSCULAR; INTRAVENOUS; SUBCUTANEOUS at 07:35

## 2018-09-05 RX ADMIN — LEVETIRACETAM 1000 MILLIGRAM(S): 250 TABLET, FILM COATED ORAL at 06:50

## 2018-09-05 RX ADMIN — HYDROMORPHONE HYDROCHLORIDE 2 MILLIGRAM(S): 2 INJECTION INTRAMUSCULAR; INTRAVENOUS; SUBCUTANEOUS at 09:01

## 2018-09-05 RX ADMIN — POLYETHYLENE GLYCOL 3350 17 GRAM(S): 17 POWDER, FOR SOLUTION ORAL at 06:50

## 2018-09-05 RX ADMIN — RISPERIDONE 0.5 MILLIGRAM(S): 4 TABLET ORAL at 18:19

## 2018-09-05 RX ADMIN — Medication 3 MILLIGRAM(S): at 21:43

## 2018-09-05 RX ADMIN — Medication 0.6 MILLIGRAM(S): at 12:38

## 2018-09-05 RX ADMIN — LEVETIRACETAM 1000 MILLIGRAM(S): 250 TABLET, FILM COATED ORAL at 18:19

## 2018-09-05 RX ADMIN — LAMOTRIGINE 25 MILLIGRAM(S): 25 TABLET, ORALLY DISINTEGRATING ORAL at 12:38

## 2018-09-05 RX ADMIN — Medication 100 MILLIGRAM(S): at 21:43

## 2018-09-05 RX ADMIN — LAMOTRIGINE 25 MILLIGRAM(S): 25 TABLET, ORALLY DISINTEGRATING ORAL at 00:34

## 2018-09-05 RX ADMIN — SENNA PLUS 2 TABLET(S): 8.6 TABLET ORAL at 21:43

## 2018-09-05 RX ADMIN — HYDROMORPHONE HYDROCHLORIDE 2 MILLIGRAM(S): 2 INJECTION INTRAMUSCULAR; INTRAVENOUS; SUBCUTANEOUS at 14:31

## 2018-09-05 RX ADMIN — HYDROMORPHONE HYDROCHLORIDE 2 MILLIGRAM(S): 2 INJECTION INTRAMUSCULAR; INTRAVENOUS; SUBCUTANEOUS at 15:44

## 2018-09-05 RX ADMIN — PANTOPRAZOLE SODIUM 40 MILLIGRAM(S): 20 TABLET, DELAYED RELEASE ORAL at 06:50

## 2018-09-05 RX ADMIN — HEPARIN SODIUM 13 UNIT(S)/HR: 5000 INJECTION INTRAVENOUS; SUBCUTANEOUS at 14:31

## 2018-09-05 RX ADMIN — POLYETHYLENE GLYCOL 3350 17 GRAM(S): 17 POWDER, FOR SOLUTION ORAL at 18:19

## 2018-09-05 RX ADMIN — HYDROMORPHONE HYDROCHLORIDE 2 MILLIGRAM(S): 2 INJECTION INTRAMUSCULAR; INTRAVENOUS; SUBCUTANEOUS at 09:25

## 2018-09-05 NOTE — PROGRESS NOTE ADULT - PROBLEM SELECTOR PLAN 1
CTA chest showed new saddle pulmonary embolism in the main pulmonary artery that continues to right and left lobar arteries. Revisualization of chronic pulmonary emboli. Possible minimal right heart strain. There are a few new small wedge-shaped peripheral opacities in the left lower lobe which may represent evolving pulmonary infarcts versus atelectasis. Etiology likely secondary to malignancy and medication non-adherance. PESI score 95 (intermediate risk: 3.2-7.1% 30 day mortality).   -Heparin drip 13cc/hr with PTT therapeutic x2, pending 6pm PTT, then daily if therapeutic  -IVC filter for Thursday   -f/u PTT, adjust according to nomogram  - Pulm following. Appreciate recs.  -plan to go home after IVC filter with Eliquis at moment. Patient without ability or support to administer Lovenox Initially- CTA chest showed new saddle pulmonary embolism in the main pulmonary artery that continues to right and left lobar arteries. Revisualization of chronic pulmonary emboli. Possible minimal right heart strain. There are a few new small wedge-shaped peripheral opacities in the left lower lobe which may represent evolving pulmonary infarcts versus atelectasis. Etiology likely secondary to malignancy and medication non-adherance. PESI score 95 (intermediate risk: 3.2-7.1% 30 day mortality).   -Heparin drip 13cc/hr with PTT therapeutic x2, pending 6pm PTT, then daily if therapeutic  - Consider discharging on Edoxaban.   -IVC filter for Thursday   - Pulm following. Appreciate recs.

## 2018-09-05 NOTE — PROGRESS NOTE ADULT - SUBJECTIVE AND OBJECTIVE BOX
OVERNIGHT EVENTS: NAEO    SUBJECTIVE / INTERVAL HPI: Patient seen and examined at bedside. Pt mentions continued tingling of his b/l LE but denies any current chest pain, SOB, or hemoptysis.     VITAL SIGNS:  Vital Signs Last 24 Hrs  T(C): 36.5 (05 Sep 2018 12:30), Max: 36.8 (04 Sep 2018 20:40)  T(F): 97.7 (05 Sep 2018 12:30), Max: 98.3 (04 Sep 2018 20:40)  HR: 82 (05 Sep 2018 12:30) (66 - 82)  BP: 123/83 (05 Sep 2018 12:30) (117/82 - 126/88)  BP(mean): 81 (04 Sep 2018 20:40) (81 - 81)  RR: 16 (05 Sep 2018 12:30) (16 - 16)  SpO2: 94% (05 Sep 2018 12:30) (94% - 97%)    PHYSICAL EXAM:    General:  male in NAD   HEENT: NC/AT; PERRL, anicteric sclera; MMM  Neck: supple  Cardiovascular: +S1/S2, RRR no murmur   Respiratory: CTA B/L; no W/R/R  Gastrointestinal: soft, NT/ND; +BSx4  Extremities: WWP; no edema  Vascular: 2+ radial, DP/PT pulses B/L  Neurological: AAOx3; no focal deficits    MEDICATIONS:  MEDICATIONS  (STANDING):  ARIPiprazole 15 milliGRAM(s) Oral daily  colchicine 0.6 milliGRAM(s) Oral daily  docusate sodium 100 milliGRAM(s) Oral three times a day  FLUoxetine 60 milliGRAM(s) Oral every 24 hours  heparin  Infusion 1300 Unit(s)/Hr (13 mL/Hr) IV Continuous <Continuous>  lamoTRIgine 25 milliGRAM(s) Oral two times a day  levETIRAcetam 1000 milliGRAM(s) Oral two times a day  melatonin 3 milliGRAM(s) Oral at bedtime  pantoprazole    Tablet 40 milliGRAM(s) Oral before breakfast  polyethylene glycol 3350 17 Gram(s) Oral every 12 hours  risperiDONE   Tablet 0.5 milliGRAM(s) Oral two times a day  senna 2 Tablet(s) Oral at bedtime  sodium chloride 0.9%. 1000 milliLiter(s) (80 mL/Hr) IV Continuous <Continuous>    MEDICATIONS  (PRN):  acetaminophen   Tablet 650 milliGRAM(s) Oral every 6 hours PRN Mild Pain (1 - 3)  HYDROmorphone   Tablet 2 milliGRAM(s) Oral every 4 hours PRN Moderate Pain (4 - 6)      ALLERGIES:  Allergies    No Known Allergies    Intolerances        LABS:                        11.6   6.3   )-----------( 251      ( 05 Sep 2018 05:32 )             37.2     09-05    139  |  99  |  13  ----------------------------<  109<H>  4.0   |  30  |  1.23    Ca    9.4      05 Sep 2018 05:32  Phos  3.9     09-05  Mg     1.9     09-05      PT/INR - ( 05 Sep 2018 02:05 )   PT: 12.5 sec;   INR: 1.12          PTT - ( 05 Sep 2018 12:58 )  PTT:90.2 sec

## 2018-09-05 NOTE — PROGRESS NOTE ADULT - PROBLEM SELECTOR PLAN 3
Patient last saw his neuro oncologist Dr. Mikala Miguel on 8/20/18. As per her note, the GBM was initially diagnosed in 2017 with onset of GTC seizures. A right sided parietal mass was resected at [place] by Dr. Tucker Lai. Path showed GBM (IDH wt, MGMT methylated, EGFR amplified. He was treated by Dr. Cho at [place] with STUPP protocol - had 2 cycle sof adjuvant TMZ and was found to have radiographic progression on MRI - with a new lesion in the left parietal lobe - this was resected by Dr. Mason on 3/13/18 - pathology was GBM, IDH WT, MGMT unmethylated, and EGFR amplified. He received RT with IV Avastin - he has had a total of 7 infusions.  - Neurosurgery saw patient in ED. Cleared to start on heparin gtt.  - Will follow recommendations.  - Low threshold to scan head if there is a change in mental status Patient last saw his neuro oncologist Dr. Mikala Miguel on 8/20/18. As per her note, the GBM was initially diagnosed in 2017 with onset of GTC seizures. A right sided parietal mass was resected at [place] by Dr. Tucker aLi. Path showed GBM (IDH wt, MGMT methylated, EGFR amplified. He was treated by Dr. Cho at [facility] with STUPP protocol - had 2 cycle sof adjuvant TMZ and was found to have radiographic progression on MRI - with a new lesion in the left parietal lobe - this was resected by Dr. Mason on 3/13/18 - pathology was GBM, IDH WT, MGMT unmethylated, and EGFR amplified. He received RT with IV Avastin - he has had a total of 7 infusions.  - Neurosurgery saw patient in ED. Cleared to start on heparin gtt.  - Low threshold to scan head if there is a change in mental status

## 2018-09-05 NOTE — PROGRESS NOTE ADULT - ASSESSMENT
52yo M with PMHx GBM (dx 2017, mass resections x 2, chemo, radiation), PE, gout, seizure d/o, depression, anxiety, presented for dizziness, left sided neck/back pain, and substernal chest pain found to have saddle embolus on CTA with mild RV strain on TTE, likely 2/2 GBM, currently on heparin drip with PTT therapeutic x2.

## 2018-09-05 NOTE — PROGRESS NOTE ADULT - PROBLEM SELECTOR PLAN 4
History of seizures secondary to GBM.   - Continue keppra 1000mg BID History of seizures secondary to GBM.   - Continue Keppra 1000mg BID

## 2018-09-05 NOTE — PROGRESS NOTE ADULT - SUBJECTIVE AND OBJECTIVE BOX
Pre-op Diagnosis: DVT/PE  Procedure: IVC filter placement  Surgeon: Dr Teixeira    Consent: signed on chart                          11.6   6.3   )-----------( 251      ( 05 Sep 2018 05:32 )             37.2     09-05    139  |  99  |  13  ----------------------------<  109<H>  4.0   |  30  |  1.23    Ca    9.4      05 Sep 2018 05:32  Phos  3.9     09-05  Mg     1.9     09-05      PT/INR - ( 05 Sep 2018 02:05 )   PT: 12.5 sec;   INR: 1.12          PTT - ( 05 Sep 2018 12:58 )  PTT:90.2 sec      Type & Screen: Type + Screen (09.02.18 @ 05:30)    ABO Interpretation: O    Rh Interpretation: Negative    Antibody Screen: Negative      CXR: < from: Xray Chest 1 View- PORTABLE-Urgent (07.23.18 @ 13:29) >  IMPRESSION:  Clear lungs.    EKG: < from: 12 Lead ECG (09.04.18 @ 09:18) >  Diagnosis Line Normal sinus rhythm  Incomplete right bundle branch block  Nonspecific ST - T abnormalities        A/P: 53yMale pre-op for IVC filter placement  1. NPO past midnight, except medications  2. IVF NS 80cc/hr  3. T&S 6pm.

## 2018-09-05 NOTE — PROGRESS NOTE ADULT - PROBLEM SELECTOR PLAN 5
History of gout. Currently symptom free.   - c/w home colchicine. History of gout. Currently symptom free.   - c/w home Colchicine.

## 2018-09-05 NOTE — PROGRESS NOTE ADULT - PROBLEM SELECTOR PLAN 2
LE Dopplers in 7/2018 showed extensive deep vein thrombosis throughout the veins of the right thigh. Patient was started on Eliquis, but has not been taking it over the last two weeks.  -health care proxy and patient now agreeing to have IVF filter placed. Vascular re-consulted, recs appreciated  -f/u vascular surgery recs  -f/u CT Abdomen/Pelvis venogram - NPO for venogram  -Patient scheduled for IVF filter placement on Thursday - NPO after midnight Wednesday  -restarted heparin gtt @ 13cc/hr as patient scheduled for IVF filter on Thursday  -f/u PTT, adjust according to nomogram LE Dopplers in 7/2018 showed extensive deep vein thrombosis throughout the veins of the right thigh. Patient was started on Eliquis, but had not been taking it over the last two weeks. Now with new saddle Embolus.   - CT venogram 9/5: DVT in R-common femoral vein extending to Superficial femoral vein  -Pending IVC filter Thursday.

## 2018-09-06 LAB
ANION GAP SERPL CALC-SCNC: 14 MMOL/L — SIGNIFICANT CHANGE UP (ref 5–17)
APTT BLD: 46.9 SEC — HIGH (ref 27.5–37.4)
APTT BLD: 65.9 SEC — HIGH (ref 27.5–37.4)
APTT BLD: 83.2 SEC — HIGH (ref 27.5–37.4)
BLD GP AB SCN SERPL QL: NEGATIVE — SIGNIFICANT CHANGE UP
BUN SERPL-MCNC: 9 MG/DL — SIGNIFICANT CHANGE UP (ref 7–23)
CALCIUM SERPL-MCNC: 9.4 MG/DL — SIGNIFICANT CHANGE UP (ref 8.4–10.5)
CHLORIDE SERPL-SCNC: 100 MMOL/L — SIGNIFICANT CHANGE UP (ref 96–108)
CO2 SERPL-SCNC: 27 MMOL/L — SIGNIFICANT CHANGE UP (ref 22–31)
CREAT SERPL-MCNC: 1.17 MG/DL — SIGNIFICANT CHANGE UP (ref 0.5–1.3)
GLUCOSE BLDC GLUCOMTR-MCNC: 118 MG/DL — HIGH (ref 70–99)
GLUCOSE SERPL-MCNC: 100 MG/DL — HIGH (ref 70–99)
HCT VFR BLD CALC: 37.4 % — LOW (ref 39–50)
HGB BLD-MCNC: 11.6 G/DL — LOW (ref 13–17)
INR BLD: 1.12 — SIGNIFICANT CHANGE UP (ref 0.88–1.16)
MAGNESIUM SERPL-MCNC: 1.8 MG/DL — SIGNIFICANT CHANGE UP (ref 1.6–2.6)
MCHC RBC-ENTMCNC: 27.8 PG — SIGNIFICANT CHANGE UP (ref 27–34)
MCHC RBC-ENTMCNC: 31 G/DL — LOW (ref 32–36)
MCV RBC AUTO: 89.7 FL — SIGNIFICANT CHANGE UP (ref 80–100)
PLATELET # BLD AUTO: 246 K/UL — SIGNIFICANT CHANGE UP (ref 150–400)
POTASSIUM SERPL-MCNC: 4 MMOL/L — SIGNIFICANT CHANGE UP (ref 3.5–5.3)
POTASSIUM SERPL-SCNC: 4 MMOL/L — SIGNIFICANT CHANGE UP (ref 3.5–5.3)
PROTHROM AB SERPL-ACNC: 12.5 SEC — SIGNIFICANT CHANGE UP (ref 9.8–12.7)
RBC # BLD: 4.17 M/UL — LOW (ref 4.2–5.8)
RBC # FLD: 13.4 % — SIGNIFICANT CHANGE UP (ref 10.3–16.9)
RH IG SCN BLD-IMP: NEGATIVE — SIGNIFICANT CHANGE UP
SODIUM SERPL-SCNC: 141 MMOL/L — SIGNIFICANT CHANGE UP (ref 135–145)
WBC # BLD: 6.3 K/UL — SIGNIFICANT CHANGE UP (ref 3.8–10.5)
WBC # FLD AUTO: 6.3 K/UL — SIGNIFICANT CHANGE UP (ref 3.8–10.5)

## 2018-09-06 PROCEDURE — 37191 INS ENDOVAS VENA CAVA FILTR: CPT | Mod: GC

## 2018-09-06 PROCEDURE — 99233 SBSQ HOSP IP/OBS HIGH 50: CPT

## 2018-09-06 RX ORDER — EDOXABAN TOSYLATE 30 MG/1
1 TABLET, FILM COATED ORAL
Qty: 30 | Refills: 0 | OUTPATIENT
Start: 2018-09-06

## 2018-09-06 RX ADMIN — LAMOTRIGINE 25 MILLIGRAM(S): 25 TABLET, ORALLY DISINTEGRATING ORAL at 00:17

## 2018-09-06 RX ADMIN — Medication 100 MILLIGRAM(S): at 06:22

## 2018-09-06 RX ADMIN — Medication 60 MILLIGRAM(S): at 16:05

## 2018-09-06 RX ADMIN — HYDROMORPHONE HYDROCHLORIDE 2 MILLIGRAM(S): 2 INJECTION INTRAMUSCULAR; INTRAVENOUS; SUBCUTANEOUS at 21:18

## 2018-09-06 RX ADMIN — HYDROMORPHONE HYDROCHLORIDE 2 MILLIGRAM(S): 2 INJECTION INTRAMUSCULAR; INTRAVENOUS; SUBCUTANEOUS at 01:00

## 2018-09-06 RX ADMIN — Medication 100 MILLIGRAM(S): at 22:12

## 2018-09-06 RX ADMIN — LAMOTRIGINE 25 MILLIGRAM(S): 25 TABLET, ORALLY DISINTEGRATING ORAL at 16:05

## 2018-09-06 RX ADMIN — HYDROMORPHONE HYDROCHLORIDE 2 MILLIGRAM(S): 2 INJECTION INTRAMUSCULAR; INTRAVENOUS; SUBCUTANEOUS at 18:27

## 2018-09-06 RX ADMIN — SENNA PLUS 2 TABLET(S): 8.6 TABLET ORAL at 22:12

## 2018-09-06 RX ADMIN — POLYETHYLENE GLYCOL 3350 17 GRAM(S): 17 POWDER, FOR SOLUTION ORAL at 06:22

## 2018-09-06 RX ADMIN — ARIPIPRAZOLE 15 MILLIGRAM(S): 15 TABLET ORAL at 16:05

## 2018-09-06 RX ADMIN — RISPERIDONE 0.5 MILLIGRAM(S): 4 TABLET ORAL at 18:27

## 2018-09-06 RX ADMIN — LEVETIRACETAM 1000 MILLIGRAM(S): 250 TABLET, FILM COATED ORAL at 06:22

## 2018-09-06 RX ADMIN — Medication 3 MILLIGRAM(S): at 22:12

## 2018-09-06 RX ADMIN — PANTOPRAZOLE SODIUM 40 MILLIGRAM(S): 20 TABLET, DELAYED RELEASE ORAL at 06:22

## 2018-09-06 RX ADMIN — SODIUM CHLORIDE 80 MILLILITER(S): 9 INJECTION INTRAMUSCULAR; INTRAVENOUS; SUBCUTANEOUS at 00:22

## 2018-09-06 RX ADMIN — HEPARIN SODIUM 15 UNIT(S)/HR: 5000 INJECTION INTRAVENOUS; SUBCUTANEOUS at 15:13

## 2018-09-06 RX ADMIN — HYDROMORPHONE HYDROCHLORIDE 2 MILLIGRAM(S): 2 INJECTION INTRAMUSCULAR; INTRAVENOUS; SUBCUTANEOUS at 10:06

## 2018-09-06 RX ADMIN — Medication 0.6 MILLIGRAM(S): at 16:05

## 2018-09-06 RX ADMIN — HYDROMORPHONE HYDROCHLORIDE 2 MILLIGRAM(S): 2 INJECTION INTRAMUSCULAR; INTRAVENOUS; SUBCUTANEOUS at 00:17

## 2018-09-06 RX ADMIN — LEVETIRACETAM 1000 MILLIGRAM(S): 250 TABLET, FILM COATED ORAL at 18:22

## 2018-09-06 RX ADMIN — Medication 650 MILLIGRAM(S): at 16:49

## 2018-09-06 RX ADMIN — RISPERIDONE 0.5 MILLIGRAM(S): 4 TABLET ORAL at 06:22

## 2018-09-06 NOTE — PROGRESS NOTE ADULT - PROBLEM SELECTOR PLAN 3
Patient last saw his neuro oncologist Dr. Mikala Miguel on 8/20/18. As per her note, the GBM was initially diagnosed in 2017 with onset of GTC seizures. A right sided parietal mass was resected at [place] by Dr. Tucker Lai. Path showed GBM (IDH wt, MGMT methylated, EGFR amplified. He was treated by Dr. Cho at [place] with STUPP protocol - had 2 cycle sof adjuvant TMZ and was found to have radiographic progression on MRI - with a new lesion in the left parietal lobe - this was resected by Dr. Mason on 3/13/18 - pathology was GBM, IDH WT, MGMT unmethylated, and EGFR amplified. He received RT with IV Avastin - he has had a total of 7 infusions.  - Neurosurgery saw patient in ED. Cleared to start on heparin gtt.  - Low threshold to scan head if there is a change in mental status

## 2018-09-06 NOTE — BRIEF OPERATIVE NOTE - OPERATION/FINDINGS
Obtained access into R CFV with US guidance, xray confirmed appropriately placement of wire. Cook Celect IVC filter placed just below renal vein. Completion cavogram showed appropriate positioning.

## 2018-09-06 NOTE — PROGRESS NOTE ADULT - PROBLEM SELECTOR PLAN 2
LE Dopplers in 7/2018 showed extensive deep vein thrombosis throughout the veins of the right thigh. Patient was started on Eliquis, but had not been taking it over the last two weeks. Now with new saddle Embolus.   - CT venogram 9/5: DVT in R-common femoral vein extending to Superficial femoral vein  -Pending IVC filter today LE Dopplers in 7/2018 showed extensive deep vein thrombosis throughout the veins of the right thigh. Patient was started on Eliquis, but had not been taking it over the last two weeks. Now with new saddle Embolus.   - CT venogram 9/5: DVT in R-common femoral vein extending to Superficial femoral vein  - IVC filter today

## 2018-09-06 NOTE — PROGRESS NOTE ADULT - PROBLEM SELECTOR PLAN 1
Initially- CTA chest showed new saddle pulmonary embolism in the main pulmonary artery that continues to right and left lobar arteries. Revisualization of chronic pulmonary emboli. Possible minimal right heart strain. There are a few new small wedge-shaped peripheral opacities in the left lower lobe which may represent evolving pulmonary infarcts versus atelectasis. Etiology likely secondary to malignancy and medication non-adherance. PESI score 95 (intermediate risk: 3.2-7.1% 30 day mortality).   -Heparin drip rate increased to 15cc/hr with PTT therapeutic x1 this AM, pending 12pm PTT  - Consider discharging on Xarelto for ease of daily dosing.   -IVC filter for today  - Pulm following. Appreciate recs. Initially- CTA chest showed new saddle pulmonary embolism in the main pulmonary artery that continues to right and left lobar arteries. Revisualization of chronic pulmonary emboli. Possible minimal right heart strain. There are a few new small wedge-shaped peripheral opacities in the left lower lobe which may represent evolving pulmonary infarcts versus atelectasis. Etiology likely secondary to malignancy and medication non-adherance. PESI score 95 (intermediate risk: 3.2-7.1% 30 day mortality).   -Heparin drip rate increased to 15cc/hr with PTT not therapeutic d/t Heparin stopped for IVC filter placement. Pending PTT at 6pm.   - Consider discharging on Xarelto for ease of daily dosing.   -IVC filter today no complications.   - Pulm following. Appreciate recs.

## 2018-09-06 NOTE — PROGRESS NOTE ADULT - SUBJECTIVE AND OBJECTIVE BOX
OVERNIGHT EVENTS: PTT was subtherapeutic therefore AM was drawn therapeutic x1.     SUBJECTIVE / INTERVAL HPI: Patient seen and examined at bedside. Pt mentioning SOB at rest and when ambulating to restroom but denies any chest pain, hemoptysis or palpitations. LE intermittent tingling continues. Pt currently NPO for IVC filter placement today.     VITAL SIGNS:  Vital Signs Last 24 Hrs  T(C): 36.8 (06 Sep 2018 06:28), Max: 36.9 (05 Sep 2018 20:30)  T(F): 98.2 (06 Sep 2018 06:28), Max: 98.5 (05 Sep 2018 20:30)  HR: 74 (06 Sep 2018 06:28) (74 - 82)  BP: 124/86 (06 Sep 2018 06:28) (117/78 - 124/86)  BP(mean): --  RR: 17 (06 Sep 2018 06:28) (16 - 17)  SpO2: 93% (06 Sep 2018 06:28) (93% - 96%)    PHYSICAL EXAM:    General:  male in NAD   HEENT: NC/AT; PERRL, anicteric sclera; MMM  Neck: supple  Cardiovascular: +S1/S2, RRR no murmur   Respiratory: CTA B/L; no W/R/R  Gastrointestinal: soft, NT/ND; +BSx4  Extremities: WWP; no edema  Vascular: 2+ radial, DP/PT pulses B/L  Neurological: AAOx3; no focal deficits    MEDICATIONS:  MEDICATIONS  (STANDING):  ARIPiprazole 15 milliGRAM(s) Oral daily  colchicine 0.6 milliGRAM(s) Oral daily  docusate sodium 100 milliGRAM(s) Oral three times a day  FLUoxetine 60 milliGRAM(s) Oral every 24 hours  heparin  Infusion 1300 Unit(s)/Hr (15 mL/Hr) IV Continuous <Continuous>  lamoTRIgine 25 milliGRAM(s) Oral two times a day  levETIRAcetam 1000 milliGRAM(s) Oral two times a day  melatonin 3 milliGRAM(s) Oral at bedtime  pantoprazole    Tablet 40 milliGRAM(s) Oral before breakfast  polyethylene glycol 3350 17 Gram(s) Oral every 12 hours  risperiDONE   Tablet 0.5 milliGRAM(s) Oral two times a day  senna 2 Tablet(s) Oral at bedtime  sodium chloride 0.9%. 1000 milliLiter(s) (80 mL/Hr) IV Continuous <Continuous>    MEDICATIONS  (PRN):  acetaminophen   Tablet 650 milliGRAM(s) Oral every 6 hours PRN Mild Pain (1 - 3)  HYDROmorphone   Tablet 2 milliGRAM(s) Oral every 4 hours PRN Moderate Pain (4 - 6)      ALLERGIES:  Allergies    No Known Allergies    Intolerances        LABS:                        11.6   6.3   )-----------( 246      ( 06 Sep 2018 06:21 )             37.4     09-06    141  |  100  |  9   ----------------------------<  100<H>  4.0   |  27  |  1.17    Ca    9.4      06 Sep 2018 06:23  Phos  3.9     09-05  Mg     1.8     09-06      PT/INR - ( 06 Sep 2018 06:22 )   PT: 12.5 sec;   INR: 1.12          PTT - ( 06 Sep 2018 06:22 )  PTT:83.2 sec    CAPILLARY BLOOD GLUCOSE          RADIOLOGY & ADDITIONAL TESTS: Reviewed. OVERNIGHT EVENTS: PTT was subtherapeutic therefore AM was drawn therapeutic x1.     SUBJECTIVE / INTERVAL HPI: Patient seen and examined at bedside. Pt mentioning SOB at rest and when ambulating to restroom but denies any chest pain, hemoptysis or palpitations. LE intermittent tingling continues. Pt currently NPO for IVC filter placement today.     VITAL SIGNS:  Vital Signs Last 24 Hrs  T(C): 36.8 (06 Sep 2018 06:28), Max: 36.9 (05 Sep 2018 20:30)  T(F): 98.2 (06 Sep 2018 06:28), Max: 98.5 (05 Sep 2018 20:30)  HR: 74 (06 Sep 2018 06:28) (74 - 82)  BP: 124/86 (06 Sep 2018 06:28) (117/78 - 124/86)  BP(mean): --  RR: 17 (06 Sep 2018 06:28) (16 - 17)  SpO2: 93% (06 Sep 2018 06:28) (93% - 96%)    PHYSICAL EXAM:    General:  male in NAD   HEENT: NC/AT; PERRL, anicteric sclera; MMM  Neck: supple  Cardiovascular: +S1/S2, RRR no murmur   Respiratory: CTA B/L; no W/R/R  Gastrointestinal: soft, NT/ND; +BSx4  Extremities: WWP; no edema  Vascular: 2+ radial, DP/PT pulses B/L  Neurological: AAOx3; cn ii-xii intact, 4/5 str all 4 ext, sensation intact bilat, no focal deficits    MEDICATIONS:  MEDICATIONS  (STANDING):  ARIPiprazole 15 milliGRAM(s) Oral daily  colchicine 0.6 milliGRAM(s) Oral daily  docusate sodium 100 milliGRAM(s) Oral three times a day  FLUoxetine 60 milliGRAM(s) Oral every 24 hours  heparin  Infusion 1300 Unit(s)/Hr (15 mL/Hr) IV Continuous <Continuous>  lamoTRIgine 25 milliGRAM(s) Oral two times a day  levETIRAcetam 1000 milliGRAM(s) Oral two times a day  melatonin 3 milliGRAM(s) Oral at bedtime  pantoprazole    Tablet 40 milliGRAM(s) Oral before breakfast  polyethylene glycol 3350 17 Gram(s) Oral every 12 hours  risperiDONE   Tablet 0.5 milliGRAM(s) Oral two times a day  senna 2 Tablet(s) Oral at bedtime  sodium chloride 0.9%. 1000 milliLiter(s) (80 mL/Hr) IV Continuous <Continuous>    MEDICATIONS  (PRN):  acetaminophen   Tablet 650 milliGRAM(s) Oral every 6 hours PRN Mild Pain (1 - 3)  HYDROmorphone   Tablet 2 milliGRAM(s) Oral every 4 hours PRN Moderate Pain (4 - 6)      ALLERGIES:  Allergies    No Known Allergies    Intolerances        LABS:                        11.6   6.3   )-----------( 246      ( 06 Sep 2018 06:21 )             37.4     09-06    141  |  100  |  9   ----------------------------<  100<H>  4.0   |  27  |  1.17    Ca    9.4      06 Sep 2018 06:23  Phos  3.9     09-05  Mg     1.8     09-06      PT/INR - ( 06 Sep 2018 06:22 )   PT: 12.5 sec;   INR: 1.12          PTT - ( 06 Sep 2018 06:22 )  PTT:83.2 sec    CAPILLARY BLOOD GLUCOSE          RADIOLOGY & ADDITIONAL TESTS: Reviewed.

## 2018-09-06 NOTE — PROGRESS NOTE ADULT - ATTENDING COMMENTS
DX  SADDLE PE AND RLE DVT -  on heparin gtt. vascular sgy to place ivc filter. will then need to be transitioned to rivaroxiban on discharge.   GBM - case d/w neurosurgery, recent MRI brain showed stable disease. pt will follow up with them as outpt  SEIZURES - cont keppra  COGNITIVE IMPAIRMENT- needs assistance with medications. home aide and home PT to be set up prior to discharge DX  SADDLE PE AND RLE DVT -  on heparin gtt. vascular sgy to place ivc filter. will then need to be transitioned to edoxaban on discharge.   GBM - case d/w neurosurgery, recent MRI brain showed stable disease. pt will follow up with them as outpt  SEIZURES - cont keppra  COGNITIVE IMPAIRMENT- needs assistance with medications. home aide and home PT to be set up prior to discharge

## 2018-09-06 NOTE — BRIEF OPERATIVE NOTE - COMMENTS
Patient with GBM, with R DVT and PE, forgetful to take anticoagulation (eliquis) he was sent home on so elected IVC filter placement after extensive deliberation.    Needs to remain in recovery for 2 hours, then may return to room. Flat for 4 hours.

## 2018-09-06 NOTE — PROGRESS NOTE ADULT - ASSESSMENT
54yo M with PMHx GBM (dx 2017, mass resections x 2, chemo, radiation), PE, gout, seizure d/o, depression, anxiety, presented for dizziness, left sided neck/back pain, and substernal chest pain found to have saddle embolus on CTA with mild RV strain on TTE, likely 2/2 GBM, currently on heparin drip with PTT therapeutic x1 this AM. Plan to discharge on Xarelto. 52yo M with PMHx GBM (dx 2017, mass resections x 2, chemo, radiation), PE, gout, seizure d/o, depression, anxiety, presented for dizziness, left sided neck/back pain, and substernal chest pain found to have saddle embolus on CTA with mild RV strain on TTE, likely 2/2 GBM, currently on heparin drip with PTT therapeutic x1 this AM. Plan to discharge on Xarelto tomorrow.

## 2018-09-07 LAB
ANION GAP SERPL CALC-SCNC: 10 MMOL/L — SIGNIFICANT CHANGE UP (ref 5–17)
APTT BLD: 148.1 SEC — CRITICAL HIGH (ref 27.5–37.4)
APTT BLD: 74.1 SEC — HIGH (ref 27.5–37.4)
APTT BLD: 77.8 SEC — HIGH (ref 27.5–37.4)
APTT BLD: 80.1 SEC — HIGH (ref 27.5–37.4)
BUN SERPL-MCNC: 11 MG/DL — SIGNIFICANT CHANGE UP (ref 7–23)
CALCIUM SERPL-MCNC: 9.2 MG/DL — SIGNIFICANT CHANGE UP (ref 8.4–10.5)
CHLORIDE SERPL-SCNC: 104 MMOL/L — SIGNIFICANT CHANGE UP (ref 96–108)
CO2 SERPL-SCNC: 28 MMOL/L — SIGNIFICANT CHANGE UP (ref 22–31)
CREAT SERPL-MCNC: 1.17 MG/DL — SIGNIFICANT CHANGE UP (ref 0.5–1.3)
EXTRA BLUE TOP TUBE: SIGNIFICANT CHANGE UP
GLUCOSE SERPL-MCNC: 98 MG/DL — SIGNIFICANT CHANGE UP (ref 70–99)
HCT VFR BLD CALC: 36.3 % — LOW (ref 39–50)
HGB BLD-MCNC: 11.5 G/DL — LOW (ref 13–17)
MAGNESIUM SERPL-MCNC: 1.9 MG/DL — SIGNIFICANT CHANGE UP (ref 1.6–2.6)
MCHC RBC-ENTMCNC: 28.5 PG — SIGNIFICANT CHANGE UP (ref 27–34)
MCHC RBC-ENTMCNC: 31.7 G/DL — LOW (ref 32–36)
MCV RBC AUTO: 89.9 FL — SIGNIFICANT CHANGE UP (ref 80–100)
PLATELET # BLD AUTO: 243 K/UL — SIGNIFICANT CHANGE UP (ref 150–400)
POTASSIUM SERPL-MCNC: 3.9 MMOL/L — SIGNIFICANT CHANGE UP (ref 3.5–5.3)
POTASSIUM SERPL-SCNC: 3.9 MMOL/L — SIGNIFICANT CHANGE UP (ref 3.5–5.3)
RBC # BLD: 4.04 M/UL — LOW (ref 4.2–5.8)
RBC # FLD: 13.7 % — SIGNIFICANT CHANGE UP (ref 10.3–16.9)
SODIUM SERPL-SCNC: 142 MMOL/L — SIGNIFICANT CHANGE UP (ref 135–145)
WBC # BLD: 6 K/UL — SIGNIFICANT CHANGE UP (ref 3.8–10.5)
WBC # FLD AUTO: 6 K/UL — SIGNIFICANT CHANGE UP (ref 3.8–10.5)

## 2018-09-07 PROCEDURE — 99233 SBSQ HOSP IP/OBS HIGH 50: CPT | Mod: GC

## 2018-09-07 RX ORDER — HEPARIN SODIUM 5000 [USP'U]/ML
1200 INJECTION INTRAVENOUS; SUBCUTANEOUS
Qty: 25000 | Refills: 0 | Status: DISCONTINUED | OUTPATIENT
Start: 2018-09-07 | End: 2018-09-07

## 2018-09-07 RX ORDER — ENOXAPARIN SODIUM 100 MG/ML
80 INJECTION SUBCUTANEOUS EVERY 12 HOURS
Qty: 0 | Refills: 0 | Status: COMPLETED | OUTPATIENT
Start: 2018-09-07 | End: 2018-09-07

## 2018-09-07 RX ORDER — ENOXAPARIN SODIUM 100 MG/ML
80 INJECTION SUBCUTANEOUS EVERY 12 HOURS
Qty: 0 | Refills: 0 | Status: DISCONTINUED | OUTPATIENT
Start: 2018-09-07 | End: 2018-09-07

## 2018-09-07 RX ADMIN — Medication 3 MILLIGRAM(S): at 21:35

## 2018-09-07 RX ADMIN — Medication 100 MILLIGRAM(S): at 13:10

## 2018-09-07 RX ADMIN — LEVETIRACETAM 1000 MILLIGRAM(S): 250 TABLET, FILM COATED ORAL at 17:51

## 2018-09-07 RX ADMIN — ARIPIPRAZOLE 15 MILLIGRAM(S): 15 TABLET ORAL at 13:10

## 2018-09-07 RX ADMIN — PANTOPRAZOLE SODIUM 40 MILLIGRAM(S): 20 TABLET, DELAYED RELEASE ORAL at 06:53

## 2018-09-07 RX ADMIN — Medication 60 MILLIGRAM(S): at 13:10

## 2018-09-07 RX ADMIN — ENOXAPARIN SODIUM 80 MILLIGRAM(S): 100 INJECTION SUBCUTANEOUS at 21:35

## 2018-09-07 RX ADMIN — Medication 100 MILLIGRAM(S): at 05:58

## 2018-09-07 RX ADMIN — RISPERIDONE 0.5 MILLIGRAM(S): 4 TABLET ORAL at 05:58

## 2018-09-07 RX ADMIN — SENNA PLUS 2 TABLET(S): 8.6 TABLET ORAL at 21:35

## 2018-09-07 RX ADMIN — LAMOTRIGINE 25 MILLIGRAM(S): 25 TABLET, ORALLY DISINTEGRATING ORAL at 00:29

## 2018-09-07 RX ADMIN — Medication 100 MILLIGRAM(S): at 21:35

## 2018-09-07 RX ADMIN — HEPARIN SODIUM 12 UNIT(S)/HR: 5000 INJECTION INTRAVENOUS; SUBCUTANEOUS at 06:53

## 2018-09-07 RX ADMIN — Medication 0.6 MILLIGRAM(S): at 13:10

## 2018-09-07 RX ADMIN — LEVETIRACETAM 1000 MILLIGRAM(S): 250 TABLET, FILM COATED ORAL at 05:58

## 2018-09-07 RX ADMIN — LAMOTRIGINE 25 MILLIGRAM(S): 25 TABLET, ORALLY DISINTEGRATING ORAL at 13:10

## 2018-09-07 RX ADMIN — RISPERIDONE 0.5 MILLIGRAM(S): 4 TABLET ORAL at 17:51

## 2018-09-07 NOTE — PROGRESS NOTE ADULT - PROBLEM SELECTOR PROBLEM 3
GBM (glioblastoma multiforme)

## 2018-09-07 NOTE — PROVIDER CONTACT NOTE (CRITICAL VALUE NOTIFICATION) - ASSESSMENT
Patients vitals are stable. Patient denies SOB.
Pt with no s/s of bleeding
pt asleep in bed no active bleed noted breaths even unlabored

## 2018-09-07 NOTE — PROGRESS NOTE ADULT - SUBJECTIVE AND OBJECTIVE BOX
OVERNIGHT EVENTS: NAEO    SUBJECTIVE / INTERVAL HPI: Patient seen and examined at bedside. Pt mentioning feeling well after IVC filter yesterday no pain or hematoma present at the sight. Otherwise, chest pain & SOB has significantly improved.     VITAL SIGNS:  Vital Signs Last 24 Hrs  T(C): 37.1 (07 Sep 2018 13:04), Max: 37.1 (07 Sep 2018 13:04)  T(F): 98.7 (07 Sep 2018 13:04), Max: 98.7 (07 Sep 2018 13:04)  HR: 71 (07 Sep 2018 13:04) (67 - 90)  BP: 124/62 (07 Sep 2018 13:04) (124/62 - 129/91)  BP(mean): --  RR: 17 (07 Sep 2018 13:04) (16 - 17)  SpO2: 96% (07 Sep 2018 13:04) (93% - 96%)    PHYSICAL EXAM:    General:  male in NAD   HEENT: NC/AT; PERRL, anicteric sclera; MMM  Neck: supple  Cardiovascular: +S1/S2, RRR no murmur   Respiratory: CTA B/L; no W/R/R  Gastrointestinal: soft, NT/ND; +BSx4  Extremities: WWP; no edema  Vascular: 2+ radial, DP/PT pulses B/L  Neurological: AAOx3; cn ii-xii intact, 4/5 str all 4 ext, sensation intact bilat, no focal deficits      MEDICATIONS:  MEDICATIONS  (STANDING):  ARIPiprazole 15 milliGRAM(s) Oral daily  colchicine 0.6 milliGRAM(s) Oral daily  docusate sodium 100 milliGRAM(s) Oral three times a day  enoxaparin Injectable 80 milliGRAM(s) SubCutaneous every 12 hours  FLUoxetine 60 milliGRAM(s) Oral every 24 hours  heparin  Infusion 1200 Unit(s)/Hr (12 mL/Hr) IV Continuous <Continuous>  lamoTRIgine 25 milliGRAM(s) Oral two times a day  levETIRAcetam 1000 milliGRAM(s) Oral two times a day  melatonin 3 milliGRAM(s) Oral at bedtime  pantoprazole    Tablet 40 milliGRAM(s) Oral before breakfast  polyethylene glycol 3350 17 Gram(s) Oral every 12 hours  risperiDONE   Tablet 0.5 milliGRAM(s) Oral two times a day  senna 2 Tablet(s) Oral at bedtime    MEDICATIONS  (PRN):  acetaminophen   Tablet 650 milliGRAM(s) Oral every 6 hours PRN Mild Pain (1 - 3)  HYDROmorphone   Tablet 2 milliGRAM(s) Oral every 4 hours PRN Moderate Pain (4 - 6)      ALLERGIES:  Allergies    No Known Allergies    Intolerances        LABS:                        11.5   6.0   )-----------( 243      ( 07 Sep 2018 07:35 )             36.3     09-07    142  |  104  |  11  ----------------------------<  98  3.9   |  28  |  1.17    Ca    9.2      07 Sep 2018 07:35  Mg     1.9     09-07      PT/INR - ( 06 Sep 2018 06:22 )   PT: 12.5 sec;   INR: 1.12          PTT - ( 07 Sep 2018 10:44 )  PTT:77.8 sec    CAPILLARY BLOOD GLUCOSE      POCT Blood Glucose.: 118 mg/dL (06 Sep 2018 21:40)      RADIOLOGY & ADDITIONAL TESTS: Reviewed.

## 2018-09-07 NOTE — PROVIDER CONTACT NOTE (CRITICAL VALUE NOTIFICATION) - BACKGROUND
Patient admitted for a saddle PE and DVT in RT thigh.
PATIENT HEPARIN DRIP ON HOLD FOR PREVIOUS REPORT OF PTT>200 REPORTED AT 2353
Pt on heparin drip at 13 cc/hour
on heparin drip 15ml/hr for pe

## 2018-09-07 NOTE — PROGRESS NOTE ADULT - SUBJECTIVE AND OBJECTIVE BOX
52 yo M with GBM, with R DVT and PE, forgetful to take anticoagulation (eliquis) he was sent home on so elected IVC filter placement after extensive deliberation, now POD 1.     Patient seen and examined. Comfortable with no complaints.   Abdomen soft nontender. R groin soft, with no signs of hematoma or bruising. Palpable PT/DP b/l. No leg swelling appreciated.     Plan per primary.   Please call [phone] with any further questions.   F/U outpatient as needed [phone] to make appointment.

## 2018-09-07 NOTE — PROGRESS NOTE ADULT - PROBLEM SELECTOR PLAN 8
1) PCP Contacted on Admission: no PMD. Under care of Dr. Mason - Neurosurgery [phone] and Dr. Mikala Miguel - Neuro Onc [phone]  2) Date of Contact with PCP: NA  3) PCP Contacted at Discharge: TBD  4) Summary of Handoff Given to PCP: TBD  5) Post-Discharge Appointment Date and Location: TBD
1) PCP Contacted on Admission: no PMD. Under care of Dr. Mason - Neurosurgery [phone] and Dr. Mikala Miguel - Neuro Onc [phone]  2) Date of Contact with PCP: NA  3) PCP Contacted at Discharge: TBD  4) Summary of Handoff Given to PCP: TBD  5) Post-Discharge Appointment Date and Location: TBD
1) PCP Contacted on Admission: no PMD. Under care of Dr. Mason - Neurosurgery [phone] and Dr. Mikala Miguel - Neuro Onc [phone]  2) Date of Contact with PCP: NA  3) PCP Contacted at Discharge: TBD  4) Summary of Handoff Given to PCP: TBD  5) Post-Discharge Appointment Date and Location: TBD
1) PCP Contacted on Admission: no PMD. Under care of Dr. Mason - Neurosurgery [phone] and Dr. Mikala Miguel - Neuro Onc [phone]  2) Date of Contact with PCP: NA  3) PCP Contacted at Discharge: TBD  4) Summary of Handoff Given to PCP: TBD  5) Post-Discharge Appointment Date and Location: TBD
1) PCP Contacted on Admission: no PMD. Under care of Dr. Mason - Neurosurgery [phone] and Dr. Mikala Miguel - Neuro Onc [phone]  2) Date of Contact with PCP: NA  3) PCP Contacted at Discharge: TBD  4) Summary of Handoff Given to PCP: TBD  5) Post-Discharge Appointment Date and Location: TBD
1) PCP Contacted on Admission: no PMD. Under care of Dr. Mason - Neurosurgery [phone] and Dr. Mikala Miguel - Neuro Onc [phone]  2) Date of Contact with PCP: NA  3) PCP Contacted at Discharge: TBD  4) Summary of Handoff Given to PCP: TBD  5) Post-Discharge Appointment Date and Location: TBD
1) PCP Contacted on Admission: no PMD. Under care of Dr. Mason - Neurosurgery [phone] and Dr. Mikala Miguel - Neuro Onc [phone]  2) Date of Contact with PCP: NA  3) PCP Contacted at Discharge: TBD  4) Summary of Handoff Given to PCP: TBD  5) Post-Discharge Appointment Date and Location: TBD
1) PCP Contacted on Admission: no PMD. Under care of Dr. Mason - Neurosurgery [phone] and Dr. Mikala Miguel - Neuro Onc [phone]  2) Date of Contact with PCP: NA  3) PCP Contacted at Discharge: TBD  4) Summary of Handoff Given to PCP: TBD  5) Post-Discharge Appointment Date and Location: TBD

## 2018-09-07 NOTE — PROGRESS NOTE ADULT - PROBLEM SELECTOR PROBLEM 1
Acute saddle pulmonary embolism without acute cor pulmonale

## 2018-09-07 NOTE — PROGRESS NOTE ADULT - PROBLEM SELECTOR PLAN 3
Patient last saw his neuro oncologist Dr. Mikala Miguel on 8/20/18. As per her note, the GBM was initially diagnosed in 2017 with onset of GTC seizures. A right sided parietal mass was resected at [facility] by Dr. Tucker Lai. Path showed GBM (IDH wt, MGMT methylated, EGFR amplified. He was treated by Dr. Cho at [place] with STUPP protocol - had 2 cycle sof adjuvant TMZ and was found to have radiographic progression on MRI - with a new lesion in the left parietal lobe - this was resected by Dr. Mason on 3/13/18 - pathology was GBM, IDH WT, MGMT unmethylated, and EGFR amplified. He received RT with IV Avastin - he has had a total of 7 infusions.  - Neurosurgery saw patient in ED. Cleared to start on heparin gtt.  - Low threshold to scan head if there is a change in mental status

## 2018-09-07 NOTE — PROVIDER CONTACT NOTE (CRITICAL VALUE NOTIFICATION) - ACTION/TREATMENT ORDERED:
Heparin rate decreased to 13ml/hr. Will continue to monitor.
heparin drip held as per Dr Marcella avery 1 hour md to redraw lab and order new rate
Lab redrawn, heparin stopped.

## 2018-09-07 NOTE — PROGRESS NOTE ADULT - PROBLEM SELECTOR PLAN 2
LE Dopplers in 7/2018 showed extensive deep vein thrombosis throughout the veins of the right thigh. Patient was started on Eliquis, but had not been taking it over the last two weeks. Now with new saddle Embolus. CT venogram 9/5: DVT in R-common femoral vein extending to Superficial femoral vein. S/p IVC filter 9/6.

## 2018-09-07 NOTE — PROGRESS NOTE ADULT - PROBLEM SELECTOR PLAN 1
Initially- CTA chest showed new saddle pulmonary embolism in the main pulmonary artery that continues to right and left lobar arteries. Revisualization of chronic pulmonary emboli. Possible minimal right heart strain. There are a few new small wedge-shaped peripheral opacities in the left lower lobe which may represent evolving pulmonary infarcts versus atelectasis. Etiology likely secondary to malignancy and medication non-adherance. PESI score 95 (intermediate risk: 3.2-7.1% 30 day mortality). S/p IVC filter 9/6. Pt on heparin drip to be transitioned to Lovenox 50 BID to be given at 10pm. and then Edoxaban tomorrow at 10am before discharge.

## 2018-09-08 VITALS
OXYGEN SATURATION: 95 % | TEMPERATURE: 98 F | DIASTOLIC BLOOD PRESSURE: 92 MMHG | RESPIRATION RATE: 16 BRPM | SYSTOLIC BLOOD PRESSURE: 135 MMHG | HEART RATE: 79 BPM

## 2018-09-08 LAB
APTT BLD: 35.7 SEC — SIGNIFICANT CHANGE UP (ref 27.5–37.4)
HCT VFR BLD CALC: 37.6 % — LOW (ref 39–50)
HGB BLD-MCNC: 11.9 G/DL — LOW (ref 13–17)
MCHC RBC-ENTMCNC: 28.3 PG — SIGNIFICANT CHANGE UP (ref 27–34)
MCHC RBC-ENTMCNC: 31.6 G/DL — LOW (ref 32–36)
MCV RBC AUTO: 89.3 FL — SIGNIFICANT CHANGE UP (ref 80–100)
PLATELET # BLD AUTO: 264 K/UL — SIGNIFICANT CHANGE UP (ref 150–400)
RBC # BLD: 4.21 M/UL — SIGNIFICANT CHANGE UP (ref 4.2–5.8)
RBC # FLD: 13.6 % — SIGNIFICANT CHANGE UP (ref 10.3–16.9)
WBC # BLD: 6.6 K/UL — SIGNIFICANT CHANGE UP (ref 3.8–10.5)
WBC # FLD AUTO: 6.6 K/UL — SIGNIFICANT CHANGE UP (ref 3.8–10.5)

## 2018-09-08 PROCEDURE — 99239 HOSP IP/OBS DSCHRG MGMT >30: CPT

## 2018-09-08 RX ORDER — FLUOXETINE HCL 10 MG
1 CAPSULE ORAL
Qty: 0 | Refills: 0 | COMMUNITY

## 2018-09-08 RX ORDER — COLCHICINE 0.6 MG
1 TABLET ORAL
Qty: 0 | Refills: 0 | COMMUNITY

## 2018-09-08 RX ADMIN — RISPERIDONE 0.5 MILLIGRAM(S): 4 TABLET ORAL at 06:10

## 2018-09-08 RX ADMIN — LEVETIRACETAM 1000 MILLIGRAM(S): 250 TABLET, FILM COATED ORAL at 06:10

## 2018-09-08 RX ADMIN — PANTOPRAZOLE SODIUM 40 MILLIGRAM(S): 20 TABLET, DELAYED RELEASE ORAL at 06:54

## 2018-09-08 RX ADMIN — Medication 0.6 MILLIGRAM(S): at 08:57

## 2018-09-08 RX ADMIN — LAMOTRIGINE 25 MILLIGRAM(S): 25 TABLET, ORALLY DISINTEGRATING ORAL at 00:53

## 2018-09-08 RX ADMIN — Medication 60 MILLIGRAM(S): at 08:58

## 2018-09-08 RX ADMIN — LAMOTRIGINE 25 MILLIGRAM(S): 25 TABLET, ORALLY DISINTEGRATING ORAL at 08:58

## 2018-09-08 RX ADMIN — ARIPIPRAZOLE 15 MILLIGRAM(S): 15 TABLET ORAL at 08:57

## 2018-09-08 RX ADMIN — Medication 100 MILLIGRAM(S): at 06:10

## 2018-09-11 ENCOUNTER — OTHER (OUTPATIENT)
Age: 53
End: 2018-09-11

## 2018-09-11 RX ORDER — APIXABAN 2.5 MG/1
1 TABLET, FILM COATED ORAL
Qty: 60 | Refills: 3 | OUTPATIENT
Start: 2018-09-11 | End: 2019-01-08

## 2018-09-12 DIAGNOSIS — G40.909 EPILEPSY, UNSPECIFIED, NOT INTRACTABLE, WITHOUT STATUS EPILEPTICUS: ICD-10-CM

## 2018-09-12 DIAGNOSIS — F32.9 MAJOR DEPRESSIVE DISORDER, SINGLE EPISODE, UNSPECIFIED: ICD-10-CM

## 2018-09-12 DIAGNOSIS — F41.8 OTHER SPECIFIED ANXIETY DISORDERS: ICD-10-CM

## 2018-09-12 DIAGNOSIS — Z92.3 PERSONAL HISTORY OF IRRADIATION: ICD-10-CM

## 2018-09-12 DIAGNOSIS — M10.9 GOUT, UNSPECIFIED: ICD-10-CM

## 2018-09-12 DIAGNOSIS — I82.409 ACUTE EMBOLISM AND THROMBOSIS OF UNSPECIFIED DEEP VEINS OF UNSPECIFIED LOWER EXTREMITY: ICD-10-CM

## 2018-09-12 DIAGNOSIS — I26.92 SADDLE EMBOLUS OF PULMONARY ARTERY WITHOUT ACUTE COR PULMONALE: ICD-10-CM

## 2018-09-12 DIAGNOSIS — C71.9 MALIGNANT NEOPLASM OF BRAIN, UNSPECIFIED: ICD-10-CM

## 2018-09-12 DIAGNOSIS — I27.82 CHRONIC PULMONARY EMBOLISM: ICD-10-CM

## 2018-09-12 DIAGNOSIS — Z91.14 PATIENT'S OTHER NONCOMPLIANCE WITH MEDICATION REGIMEN: ICD-10-CM

## 2018-09-17 ENCOUNTER — APPOINTMENT (OUTPATIENT)
Dept: NEUROLOGY | Facility: CLINIC | Age: 53
End: 2018-09-17
Payer: MEDICAID

## 2018-09-17 ENCOUNTER — EMERGENCY (EMERGENCY)
Facility: HOSPITAL | Age: 53
LOS: 1 days | Discharge: HOME | End: 2018-09-17
Attending: EMERGENCY MEDICINE | Admitting: EMERGENCY MEDICINE
Payer: COMMERCIAL

## 2018-09-17 ENCOUNTER — APPOINTMENT (OUTPATIENT)
Dept: INFUSION THERAPY | Facility: HOSPITAL | Age: 53
End: 2018-09-17

## 2018-09-17 VITALS
TEMPERATURE: 98 F | OXYGEN SATURATION: 97 % | RESPIRATION RATE: 18 BRPM | HEART RATE: 88 BPM | SYSTOLIC BLOOD PRESSURE: 139 MMHG | DIASTOLIC BLOOD PRESSURE: 85 MMHG

## 2018-09-17 VITALS
SYSTOLIC BLOOD PRESSURE: 105 MMHG | WEIGHT: 210 LBS | HEIGHT: 78 IN | HEART RATE: 118 BPM | BODY MASS INDEX: 24.3 KG/M2 | OXYGEN SATURATION: 96 % | DIASTOLIC BLOOD PRESSURE: 74 MMHG

## 2018-09-17 VITALS
HEIGHT: 78 IN | HEART RATE: 109 BPM | DIASTOLIC BLOOD PRESSURE: 83 MMHG | TEMPERATURE: 97 F | SYSTOLIC BLOOD PRESSURE: 122 MMHG | OXYGEN SATURATION: 97 % | RESPIRATION RATE: 18 BRPM | WEIGHT: 211.42 LBS

## 2018-09-17 DIAGNOSIS — Z79.899 OTHER LONG TERM (CURRENT) DRUG THERAPY: ICD-10-CM

## 2018-09-17 DIAGNOSIS — F32.9 MAJOR DEPRESSIVE DISORDER, SINGLE EPISODE, UNSPECIFIED: ICD-10-CM

## 2018-09-17 DIAGNOSIS — Z86.59 PERSONAL HISTORY OF OTHER MENTAL AND BEHAVIORAL DISORDERS: ICD-10-CM

## 2018-09-17 DIAGNOSIS — R69 ILLNESS, UNSPECIFIED: ICD-10-CM

## 2018-09-17 DIAGNOSIS — F06.30 MOOD DISORDER DUE TO KNOWN PHYSIOLOGICAL CONDITION, UNSPECIFIED: ICD-10-CM

## 2018-09-17 DIAGNOSIS — R53.1 WEAKNESS: ICD-10-CM

## 2018-09-17 DIAGNOSIS — Z98.890 OTHER SPECIFIED POSTPROCEDURAL STATES: Chronic | ICD-10-CM

## 2018-09-17 LAB
ALBUMIN SERPL ELPH-MCNC: 4.4 G/DL — SIGNIFICANT CHANGE UP (ref 3.3–5)
ALP SERPL-CCNC: 62 U/L — SIGNIFICANT CHANGE UP (ref 40–120)
ALT FLD-CCNC: 13 U/L — SIGNIFICANT CHANGE UP (ref 10–45)
AMPHET UR-MCNC: NEGATIVE — SIGNIFICANT CHANGE UP
ANION GAP SERPL CALC-SCNC: 14 MMOL/L — SIGNIFICANT CHANGE UP (ref 5–17)
APAP SERPL-MCNC: <5 UG/ML — LOW (ref 10–30)
APPEARANCE UR: CLEAR — SIGNIFICANT CHANGE UP
APTT BLD: 35.3 SEC — SIGNIFICANT CHANGE UP (ref 27.5–37.4)
AST SERPL-CCNC: 20 U/L — SIGNIFICANT CHANGE UP (ref 10–40)
BACTERIA # UR AUTO: PRESENT /HPF
BARBITURATES UR SCN-MCNC: NEGATIVE — SIGNIFICANT CHANGE UP
BASOPHILS NFR BLD AUTO: 0.9 % — SIGNIFICANT CHANGE UP (ref 0–2)
BENZODIAZ UR-MCNC: NEGATIVE — SIGNIFICANT CHANGE UP
BILIRUB SERPL-MCNC: 0.4 MG/DL — SIGNIFICANT CHANGE UP (ref 0.2–1.2)
BILIRUB UR-MCNC: NEGATIVE — SIGNIFICANT CHANGE UP
BUN SERPL-MCNC: 14 MG/DL — SIGNIFICANT CHANGE UP (ref 7–23)
CALCIUM SERPL-MCNC: 9.9 MG/DL — SIGNIFICANT CHANGE UP (ref 8.4–10.5)
CHLORIDE SERPL-SCNC: 99 MMOL/L — SIGNIFICANT CHANGE UP (ref 96–108)
CO2 SERPL-SCNC: 24 MMOL/L — SIGNIFICANT CHANGE UP (ref 22–31)
COCAINE METAB.OTHER UR-MCNC: NEGATIVE — SIGNIFICANT CHANGE UP
COLOR SPEC: YELLOW — SIGNIFICANT CHANGE UP
COMMENT - URINE: SIGNIFICANT CHANGE UP
CREAT SERPL-MCNC: 1.25 MG/DL — SIGNIFICANT CHANGE UP (ref 0.5–1.3)
DIFF PNL FLD: NEGATIVE — SIGNIFICANT CHANGE UP
EOSINOPHIL NFR BLD AUTO: 0.7 % — SIGNIFICANT CHANGE UP (ref 0–6)
EPI CELLS # UR: SIGNIFICANT CHANGE UP /HPF (ref 0–5)
ETHANOL SERPL-MCNC: <10 MG/DL — SIGNIFICANT CHANGE UP (ref 0–10)
GLUCOSE SERPL-MCNC: 146 MG/DL — HIGH (ref 70–99)
GLUCOSE UR QL: NEGATIVE — SIGNIFICANT CHANGE UP
HCT VFR BLD CALC: 45.6 % — SIGNIFICANT CHANGE UP (ref 39–50)
HGB BLD-MCNC: 14.6 G/DL — SIGNIFICANT CHANGE UP (ref 13–17)
INR BLD: 1.48 — HIGH (ref 0.88–1.16)
KETONES UR-MCNC: ABNORMAL MG/DL
LEUKOCYTE ESTERASE UR-ACNC: NEGATIVE — SIGNIFICANT CHANGE UP
LYMPHOCYTES # BLD AUTO: 24.7 % — SIGNIFICANT CHANGE UP (ref 13–44)
MCHC RBC-ENTMCNC: 28.2 PG — SIGNIFICANT CHANGE UP (ref 27–34)
MCHC RBC-ENTMCNC: 32 G/DL — SIGNIFICANT CHANGE UP (ref 32–36)
MCV RBC AUTO: 88 FL — SIGNIFICANT CHANGE UP (ref 80–100)
METHADONE UR-MCNC: NEGATIVE — SIGNIFICANT CHANGE UP
MONOCYTES NFR BLD AUTO: 13.2 % — SIGNIFICANT CHANGE UP (ref 2–14)
NEUTROPHILS NFR BLD AUTO: 60.5 % — SIGNIFICANT CHANGE UP (ref 43–77)
NITRITE UR-MCNC: NEGATIVE — SIGNIFICANT CHANGE UP
OPIATES UR-MCNC: NEGATIVE — SIGNIFICANT CHANGE UP
PCP SPEC-MCNC: SIGNIFICANT CHANGE UP
PCP UR-MCNC: NEGATIVE — SIGNIFICANT CHANGE UP
PH UR: 6 — SIGNIFICANT CHANGE UP (ref 5–8)
PLATELET # BLD AUTO: 350 K/UL — SIGNIFICANT CHANGE UP (ref 150–400)
POTASSIUM SERPL-MCNC: 4.8 MMOL/L — SIGNIFICANT CHANGE UP (ref 3.5–5.3)
POTASSIUM SERPL-SCNC: 4.8 MMOL/L — SIGNIFICANT CHANGE UP (ref 3.5–5.3)
PROT SERPL-MCNC: 7.9 G/DL — SIGNIFICANT CHANGE UP (ref 6–8.3)
PROT UR-MCNC: ABNORMAL MG/DL
PROTHROM AB SERPL-ACNC: 16.6 SEC — HIGH (ref 9.8–12.7)
RBC # BLD: 5.18 M/UL — SIGNIFICANT CHANGE UP (ref 4.2–5.8)
RBC # FLD: 14.3 % — SIGNIFICANT CHANGE UP (ref 10.3–16.9)
RBC CASTS # UR COMP ASSIST: < 5 /HPF — SIGNIFICANT CHANGE UP
SALICYLATES SERPL-MCNC: <0.3 MG/DL — LOW (ref 2.8–20)
SODIUM SERPL-SCNC: 137 MMOL/L — SIGNIFICANT CHANGE UP (ref 135–145)
SP GR SPEC: 1.01 — SIGNIFICANT CHANGE UP (ref 1–1.03)
THC UR QL: NEGATIVE — SIGNIFICANT CHANGE UP
UROBILINOGEN FLD QL: 0.2 E.U./DL — SIGNIFICANT CHANGE UP
WBC # BLD: 6.9 K/UL — SIGNIFICANT CHANGE UP (ref 3.8–10.5)
WBC # FLD AUTO: 6.9 K/UL — SIGNIFICANT CHANGE UP (ref 3.8–10.5)
WBC UR QL: < 5 /HPF — SIGNIFICANT CHANGE UP

## 2018-09-17 PROCEDURE — 99285 EMERGENCY DEPT VISIT HI MDM: CPT | Mod: 25

## 2018-09-17 PROCEDURE — 96374 THER/PROPH/DIAG INJ IV PUSH: CPT

## 2018-09-17 PROCEDURE — 81001 URINALYSIS AUTO W/SCOPE: CPT

## 2018-09-17 PROCEDURE — 85730 THROMBOPLASTIN TIME PARTIAL: CPT

## 2018-09-17 PROCEDURE — 80053 COMPREHEN METABOLIC PANEL: CPT

## 2018-09-17 PROCEDURE — 85025 COMPLETE CBC W/AUTO DIFF WBC: CPT

## 2018-09-17 PROCEDURE — 93010 ELECTROCARDIOGRAM REPORT: CPT

## 2018-09-17 PROCEDURE — 90792 PSYCH DIAG EVAL W/MED SRVCS: CPT

## 2018-09-17 PROCEDURE — 80307 DRUG TEST PRSMV CHEM ANLYZR: CPT

## 2018-09-17 PROCEDURE — 99214 OFFICE O/P EST MOD 30 MIN: CPT

## 2018-09-17 PROCEDURE — 85610 PROTHROMBIN TIME: CPT

## 2018-09-17 PROCEDURE — 93005 ELECTROCARDIOGRAM TRACING: CPT

## 2018-09-17 PROCEDURE — 99284 EMERGENCY DEPT VISIT MOD MDM: CPT | Mod: 25

## 2018-09-17 PROCEDURE — 36415 COLL VENOUS BLD VENIPUNCTURE: CPT

## 2018-09-17 RX ORDER — LAMOTRIGINE 25 MG/1
0 TABLET, ORALLY DISINTEGRATING ORAL
Qty: 0 | Refills: 0 | COMMUNITY

## 2018-09-17 RX ORDER — LEVETIRACETAM 250 MG/1
0 TABLET, FILM COATED ORAL
Qty: 0 | Refills: 0 | COMMUNITY

## 2018-09-17 RX ORDER — RISPERIDONE 4 MG/1
1 TABLET ORAL
Qty: 60 | Refills: 0 | OUTPATIENT
Start: 2018-09-17 | End: 2018-10-16

## 2018-09-17 RX ORDER — SODIUM CHLORIDE 9 MG/ML
1000 INJECTION INTRAMUSCULAR; INTRAVENOUS; SUBCUTANEOUS ONCE
Qty: 0 | Refills: 0 | Status: COMPLETED | OUTPATIENT
Start: 2018-09-17 | End: 2018-09-17

## 2018-09-17 RX ORDER — FLUOXETINE HCL 10 MG
0 CAPSULE ORAL
Qty: 0 | Refills: 0 | COMMUNITY

## 2018-09-17 RX ORDER — PANTOPRAZOLE SODIUM 20 MG/1
1 TABLET, DELAYED RELEASE ORAL
Qty: 0 | Refills: 0 | COMMUNITY

## 2018-09-17 RX ORDER — ARIPIPRAZOLE 15 MG/1
1 TABLET ORAL
Qty: 30 | Refills: 0 | OUTPATIENT
Start: 2018-09-17 | End: 2018-10-16

## 2018-09-17 RX ORDER — ONDANSETRON 8 MG/1
4 TABLET, FILM COATED ORAL ONCE
Qty: 0 | Refills: 0 | Status: COMPLETED | OUTPATIENT
Start: 2018-09-17 | End: 2018-09-17

## 2018-09-17 RX ORDER — EDOXABAN TOSYLATE 30 MG/1
0 TABLET, FILM COATED ORAL
Qty: 0 | Refills: 0 | COMMUNITY

## 2018-09-17 RX ORDER — ONDANSETRON 8 MG/1
1 TABLET, FILM COATED ORAL
Qty: 20 | Refills: 0 | OUTPATIENT
Start: 2018-09-17

## 2018-09-17 RX ORDER — COLCHICINE 0.6 MG
1 TABLET ORAL
Qty: 0 | Refills: 0 | COMMUNITY

## 2018-09-17 RX ADMIN — SODIUM CHLORIDE 2000 MILLILITER(S): 9 INJECTION INTRAMUSCULAR; INTRAVENOUS; SUBCUTANEOUS at 16:42

## 2018-09-17 RX ADMIN — ONDANSETRON 4 MILLIGRAM(S): 8 TABLET, FILM COATED ORAL at 16:42

## 2018-09-17 NOTE — ED BEHAVIORAL HEALTH ASSESSMENT NOTE - HPI (INCLUDE ILLNESS QUALITY, SEVERITY, DURATION, TIMING, CONTEXT, MODIFYING FACTORS, ASSOCIATED SIGNS AND SYMPTOMS)
53-year-old male with hx of bipolar disorder, more recent hx of GBM, now brought in by his friends/HCP's, Sai and Bob Brown, with complaint of worsening depression, at recommendation of his outpt neurologist, Dr. Miguel.  Pt endorses decreased appetite and nausea, states he now weighs 205# (from 230#). He also endorses anhedonia, anergy, intermittent tearfulness, passive suicidal ideation but no intent or plan ("I try not to think about that."). Sleeps okay once he takes Ambien.   Pt's HCP's/friends note that pt is socially isolating himself, only sees other people when they insist on getting together with them. This past weekend, friends from [place] came to visit him. He states even their presence did nothing to lift his spirits.    Pt's friend/HCP, Sai Brown, notes that pt was doing relatively well until he was recently admitted to [facility] for treatment of blood clot. Mr. Brown wonders whether pt's psychiatric rx regimen was changed during the hospitalization, most specifically whether his maintenance Abilify 15 mg was overlooked.  In any event, both Mr. and Mrs. Brown acknowledge that pt may not be reliably filling and taking his maintenance medications (Abilify 15 mg po qdaily, Risperidone 0.5 mg po BID and Prozac 60 mg po qdaily).    More significantly, Mr. and . 53-year-old male with hx of bipolar disorder, more recent hx of GBM, now brought in by his friends/HCP's, Sai and Bob Brown, with complaint of worsening depression, at recommendation of his outpt neurologist, Dr. Miguel.  Pt endorses decreased appetite and nausea, states he now weighs 205# (from 230#). He also endorses anhedonia, anergy, intermittent tearfulness, passive suicidal ideation but no intent or plan ("I try not to think about that."). Sleeps okay once he takes Ambien.   Pt's HCP's/friends note that pt is socially isolating himself, only sees other people when they insist on getting together with them. This past weekend, friends from [place] came to visit him. He states even their presence did nothing to lift his spirits.    Pt's friend/HCP, Sai Brown, notes that pt was doing relatively well until he was recently admitted to [facility] for treatment of blood clot. Mr. Brown wonders whether pt's psychiatric rx regimen was changed during the hospitalization, most specifically whether his maintenance Abilify 15 mg was overlooked.  In any event, both Mr. and Mrs. Brown acknowledge that pt may not be reliably filling and taking his maintenance medications (Abilify 15 mg po qdaily, Risperidone 0.5 mg po BID and Prozac 60 mg po qdaily).    More significantly, Mr. and Mrs. Brown acknowledge that pt has never followed up with outpt referrals, even s/p inpt psychiatric admits.

## 2018-09-17 NOTE — ED PROVIDER NOTE - CONSTITUTIONAL, MLM
normal... Well appearing, well nourished, awake, alert, oriented to person, place, time/situation and flat affect, appears dehydrated

## 2018-09-17 NOTE — ED PROVIDER NOTE - PMH
Anxiety and depression    GBM (glioblastoma multiforme)    Gout    Seizure disorder Anxiety and depression    DVT (deep venous thrombosis)    GBM (glioblastoma multiforme)    Gout    PE (pulmonary thromboembolism)    Seizure disorder

## 2018-09-17 NOTE — ED PROVIDER NOTE - MEDICAL DECISION MAKING DETAILS
Pt c/o depression w passive si, also n and anorexia ? 2/2 depression or med issue.  Pt reports similar n/v 2/2 depression in the past. Pt also w h/o gbm under treatment, denies new sx or complaint related to this, s/p admit for saddle embolus and dvt s/p ivc filter and on anticoagulation.  Pt recently ran out of some of his psych meds.  Plan labs, ivf, zofran, consult nsg, palliative care, and psych, reassess.

## 2018-09-17 NOTE — ED PROVIDER NOTE - OBJECTIVE STATEMENT
54 yo male h/o gbm currently receiving treatment, depression, recent admit for saddle pe and dvt s/p filter and on anticoagulation w edoxaban c/o severe depression - reports poor concentration, sleep, anorexia w n any time he tries to eat - h/o similar when he's had severe depression in the past.  No ha, change in his baseline neuro sx 2/2 his gbm, abd pain, diarrhea.  Pt denies si/hi/hallucinations but feels he doesn't see the point in continuing his gbm treatment when he feels this depressed.  No drugs, etoh.  Pt just ran out of his abilify and is low on his risperdone, also taking paroxetine. 54 yo male h/o gbm currently receiving treatment, depression, recent admit for saddle pe and dvt s/p filter and on anticoagulation w edoxaban c/o severe depression - reports poor concentration, sleep, anorexia w n any time he tries to eat - h/o similar when he's had severe depression in the past.  No ha, change in his baseline neuro sx 2/2 his gbm, abd pain, diarrhea.  Pt denies si/hi/hallucinations but feels he doesn't see the point in continuing his gbm treatment when he feels this depressed.  No drugs, etoh.  Pt just ran out of his abilify and is low on his risperdone, also taking paroxetine.  Pt c/o feeling weak 2/2 poor po's.

## 2018-09-17 NOTE — ED BEHAVIORAL HEALTH ASSESSMENT NOTE - DIFFERENTIAL
Bipolar Disorder, Recurrent, Moderate, Depressed without psychosis  Mood Disorder due to General Medical Condition (GBM)

## 2018-09-17 NOTE — ED PROVIDER NOTE - PROGRESS NOTE DETAILS
Pt discussed w nsg - ct during last admit w/o need for surg intervention.  If pt denies neuro sx, pt does not need imaging or intervention at this time. Palliative care unable to see pt in ed today but will eval tomorrow if inpt; CM will eval pt for needs. Pt discussed w Dr Campoverde who will eval pt. Pt jordyn and cleared for dc by psych.  Labs wn.  Psych requests refills for pt's abilify and risperdal. Pt eval and cleared for dc by psych.  Labs wnl.  Psych requests refills for pt's abilify and risperdal.  Pt contracted for safety w me.

## 2018-09-17 NOTE — ED BEHAVIORAL HEALTH ASSESSMENT NOTE - RISK ASSESSMENT
Pt with no hx of suicide attempts, no hx of homicidality, endorses passive suicidal ideation only now, and denies intent or plan. Given his hx of bipolar disorder, as well as superimposed GBM and related medications, pt is at moderate risk of self-harm, though low risk of harm to others.

## 2018-09-17 NOTE — ED BEHAVIORAL HEALTH ASSESSMENT NOTE - SAFETY PLAN DETAILS
Pt and friends/HCP's understand they may return to this or any ED/call 911 prn worsening depression, especial suicidal ideation with plan.

## 2018-09-17 NOTE — ED BEHAVIORAL HEALTH ASSESSMENT NOTE - MEDICATIONS (PRESCRIPTIONS, DIRECTIONS)
Continue Prozac 60 mg po qdaily. Pt needs prescriptions for Risperidone 0.5 mg po bid (#60) and Abilify 15 mg po qdaily (#30).

## 2018-09-17 NOTE — ED BEHAVIORAL HEALTH ASSESSMENT NOTE - OTHER PAST PSYCHIATRIC HISTORY (INCLUDE DETAILS REGARDING ONSET, COURSE OF ILLNESS, INPATIENT/OUTPATIENT TREATMENT)
As above. As above.    At least 3-4 prior inpt psychiatric admits for bipolar disorder, including 2000 @ [facility], 2006 @ [facility], ?2012, and, within the last year, [facility].     Pauly Gruber/[facility] admit (x > 1 month) was for depression, catatonia, not eating or drinking.     HCP's acknowledge pt did not follow up s/p discharge, unclear regarding what was set up for the pt at that time.

## 2018-09-17 NOTE — ED BEHAVIORAL HEALTH ASSESSMENT NOTE - DESCRIPTION
Pt observed receiving IV hydration, friends/HCP's in attendance. Quiet and cooperative GBM Born in [place]. Came to [facility] on basketball scholarship. 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 Never ; no kids. Lives alone. Has HHA 7 days/week. Born in [place]. Came to \[facility]\"" on basketball scholarship. 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 Never ; no kids. Lives alone. Has HHA 7 days/week. Per HCP's prior HHA was not reliable in terms of monitoring whether pt took his medications. There is a new HHA who appears to be more diligent regarding making sure the pt takes his medications.

## 2018-09-17 NOTE — ED ADULT NURSE NOTE - NSIMPLEMENTINTERV_GEN_ALL_ED
Implemented All Universal Safety Interventions:  [place] to call system. Call bell, personal items and telephone within reach. Instruct patient to call for assistance. Room bathroom lighting operational. Non-slip footwear when patient is off stretcher. Physically safe environment: no spills, clutter or unnecessary equipment. Stretcher in lowest position, wheels locked, appropriate side rails in place.

## 2018-09-17 NOTE — ED BEHAVIORAL HEALTH ASSESSMENT NOTE - CURRENT MEDICATION
Prozac 60 mg po qdaily  Risperidone 0.5 mg po bid (running out of supply)  Abilify 15 mg po qdaily (has not taken since at least prior inpt medical admit, perhaps longer)

## 2018-09-17 NOTE — ED BEHAVIORAL HEALTH ASSESSMENT NOTE - SUMMARY
53-year-old male with hx of bipolar disorder, more recent hx of GBM, now brought in by his friends/HCP's, with complaint of worsening depression, including decreased appetite, passive suicidal ideation (no intent or plan, no hx), depressed mood, anhedonia, anergy and tearfulness, in context of likely non-compliance with rx regimen, as well as no outpt follow up.    Pt currently endorses desire to be discharged home.    Pt and HCP's advised that pt can call 911/return to this or any ED prn worsening depression, especially active suicidal ideation with plan.    Pt and HCP's advised that pt would benefit from outpt follow up to address both his bipolar disorder as well as GBM.    For now, pt should continue Prozac and Risperidone, re-start Abilify, which will hopefully stabilize his mood. 53-year-old male with hx of bipolar disorder, more recent hx of GBM, now brought in by his friends/HCP's, with complaint of worsening depression, including decreased appetite, passive suicidal ideation (no intent or plan, no hx), depressed mood, anhedonia, anergy and tearfulness, in context of likely (unintentional) partial noncompliance with rx regimen, as well as no outpt follow up.    Pt currently endorses desire to be discharged home.    Pt and HCP's advised that pt can call 911/return to this or any ED prn worsening depression, especially active suicidal ideation with plan.    Pt and HCP's advised that pt would benefit from outpt follow up to address both his bipolar disorder as well as GBM.    For now, pt should continue Prozac and Risperidone, re-start Abilify, which will hopefully stabilize his mood.

## 2018-09-17 NOTE — ED BEHAVIORAL HEALTH ASSESSMENT NOTE - DETAILS
Self-referred Pt with passive suicidal ideation; no intent or plan. No hx of suicide planning or attempts. "I feel awful." Nausea Will contact Dr. Miguel.

## 2018-09-17 NOTE — ED ADULT NURSE NOTE - OBJECTIVE STATEMENT
Pt presents to ED accompanied by friend stating he feels increasingly depressed, worsening for the past week, with difficulty sleeping, poor appetite, and nausea. Pt with hx glioblastoma. Pt states "I do not have thoughts of hurting myself right now but I am feeling really bad."

## 2018-09-17 NOTE — ED BEHAVIORAL HEALTH ASSESSMENT NOTE - OTHER
Friends/HCP's Referred to [facility] ED by Dr. Miguel, neurologist ([phone]). Slow Somewhat impoverished Passive SI ("I try not to think about that.") Denies intent or plan. A bit distractable Per HCP's, pt with impaired memory overall, though he is able to report what medications he takes and the doses. Per HCP's, pt with impaired memory overall, though he is able to report some dates of prior inpt psychiatric admissions.

## 2018-09-18 DIAGNOSIS — Z95.828 PRESENCE OF OTHER VASCULAR IMPLANTS AND GRAFTS: Chronic | ICD-10-CM

## 2018-09-19 ENCOUNTER — APPOINTMENT (OUTPATIENT)
Dept: HEART AND VASCULAR | Facility: CLINIC | Age: 53
End: 2018-09-19

## 2018-09-24 ENCOUNTER — APPOINTMENT (OUTPATIENT)
Dept: INFUSION THERAPY | Facility: HOSPITAL | Age: 53
End: 2018-09-24

## 2018-09-24 ENCOUNTER — OUTPATIENT (OUTPATIENT)
Dept: OUTPATIENT SERVICES | Facility: HOSPITAL | Age: 53
LOS: 1 days | End: 2018-09-24
Payer: COMMERCIAL

## 2018-09-24 VITALS
RESPIRATION RATE: 18 BRPM | DIASTOLIC BLOOD PRESSURE: 84 MMHG | WEIGHT: 210.1 LBS | SYSTOLIC BLOOD PRESSURE: 118 MMHG | HEART RATE: 106 BPM | HEIGHT: 78 IN | TEMPERATURE: 97 F | OXYGEN SATURATION: 97 %

## 2018-09-24 DIAGNOSIS — Z98.890 OTHER SPECIFIED POSTPROCEDURAL STATES: Chronic | ICD-10-CM

## 2018-09-24 DIAGNOSIS — C71.9 MALIGNANT NEOPLASM OF BRAIN, UNSPECIFIED: ICD-10-CM

## 2018-09-24 DIAGNOSIS — Z95.828 PRESENCE OF OTHER VASCULAR IMPLANTS AND GRAFTS: Chronic | ICD-10-CM

## 2018-09-24 LAB
ALBUMIN SERPL ELPH-MCNC: 4.3 G/DL — SIGNIFICANT CHANGE UP (ref 3.3–5)
ALP SERPL-CCNC: 63 U/L — SIGNIFICANT CHANGE UP (ref 40–120)
ALT FLD-CCNC: 11 U/L — SIGNIFICANT CHANGE UP (ref 10–45)
ANION GAP SERPL CALC-SCNC: 14 MMOL/L — SIGNIFICANT CHANGE UP (ref 5–17)
APPEARANCE UR: CLEAR — SIGNIFICANT CHANGE UP
AST SERPL-CCNC: 15 U/L — SIGNIFICANT CHANGE UP (ref 10–40)
BASOPHILS NFR BLD AUTO: 0.4 % — SIGNIFICANT CHANGE UP (ref 0–2)
BILIRUB SERPL-MCNC: 0.5 MG/DL — SIGNIFICANT CHANGE UP (ref 0.2–1.2)
BILIRUB UR-MCNC: ABNORMAL
BUN SERPL-MCNC: 13 MG/DL — SIGNIFICANT CHANGE UP (ref 7–23)
CALCIUM SERPL-MCNC: 10.4 MG/DL — SIGNIFICANT CHANGE UP (ref 8.4–10.5)
CHLORIDE SERPL-SCNC: 99 MMOL/L — SIGNIFICANT CHANGE UP (ref 96–108)
CO2 SERPL-SCNC: 27 MMOL/L — SIGNIFICANT CHANGE UP (ref 22–31)
COLOR SPEC: YELLOW — SIGNIFICANT CHANGE UP
CREAT SERPL-MCNC: 1.28 MG/DL — SIGNIFICANT CHANGE UP (ref 0.5–1.3)
DIFF PNL FLD: NEGATIVE — SIGNIFICANT CHANGE UP
EOSINOPHIL NFR BLD AUTO: 0.2 % — SIGNIFICANT CHANGE UP (ref 0–6)
GLUCOSE SERPL-MCNC: 154 MG/DL — HIGH (ref 70–99)
GLUCOSE UR QL: NEGATIVE — SIGNIFICANT CHANGE UP
HCT VFR BLD CALC: 48.2 % — SIGNIFICANT CHANGE UP (ref 39–50)
HGB BLD-MCNC: 15.2 G/DL — SIGNIFICANT CHANGE UP (ref 13–17)
KETONES UR-MCNC: 40 MG/DL
LEUKOCYTE ESTERASE UR-ACNC: NEGATIVE — SIGNIFICANT CHANGE UP
LYMPHOCYTES # BLD AUTO: 16.5 % — SIGNIFICANT CHANGE UP (ref 13–44)
MCHC RBC-ENTMCNC: 28.6 PG — SIGNIFICANT CHANGE UP (ref 27–34)
MCHC RBC-ENTMCNC: 31.5 G/DL — LOW (ref 32–36)
MCV RBC AUTO: 90.6 FL — SIGNIFICANT CHANGE UP (ref 80–100)
MONOCYTES NFR BLD AUTO: 8 % — SIGNIFICANT CHANGE UP (ref 2–14)
NEUTROPHILS NFR BLD AUTO: 74.9 % — SIGNIFICANT CHANGE UP (ref 43–77)
NITRITE UR-MCNC: NEGATIVE — SIGNIFICANT CHANGE UP
PH UR: 6 — SIGNIFICANT CHANGE UP (ref 5–8)
PLATELET # BLD AUTO: 237 K/UL — SIGNIFICANT CHANGE UP (ref 150–400)
POTASSIUM SERPL-MCNC: 4.1 MMOL/L — SIGNIFICANT CHANGE UP (ref 3.5–5.3)
POTASSIUM SERPL-SCNC: 4.1 MMOL/L — SIGNIFICANT CHANGE UP (ref 3.5–5.3)
PROT SERPL-MCNC: 7.8 G/DL — SIGNIFICANT CHANGE UP (ref 6–8.3)
PROT UR-MCNC: 30 MG/DL
RBC # BLD: 5.32 M/UL — SIGNIFICANT CHANGE UP (ref 4.2–5.8)
RBC # FLD: 14.4 % — SIGNIFICANT CHANGE UP (ref 10.3–16.9)
SODIUM SERPL-SCNC: 140 MMOL/L — SIGNIFICANT CHANGE UP (ref 135–145)
SP GR SPEC: 1.02 — SIGNIFICANT CHANGE UP (ref 1–1.03)
UROBILINOGEN FLD QL: 0.2 E.U./DL — SIGNIFICANT CHANGE UP
WBC # BLD: 5.5 K/UL — SIGNIFICANT CHANGE UP (ref 3.8–10.5)
WBC # FLD AUTO: 5.5 K/UL — SIGNIFICANT CHANGE UP (ref 3.8–10.5)

## 2018-09-24 PROCEDURE — 85025 COMPLETE CBC W/AUTO DIFF WBC: CPT

## 2018-09-24 PROCEDURE — 96413 CHEMO IV INFUSION 1 HR: CPT

## 2018-09-24 PROCEDURE — 36415 COLL VENOUS BLD VENIPUNCTURE: CPT

## 2018-09-24 PROCEDURE — 81001 URINALYSIS AUTO W/SCOPE: CPT

## 2018-09-24 PROCEDURE — 80053 COMPREHEN METABOLIC PANEL: CPT

## 2018-09-24 RX ORDER — BEVACIZUMAB 400 MG/16ML
970 INJECTION, SOLUTION INTRAVENOUS ONCE
Qty: 0 | Refills: 0 | Status: COMPLETED | OUTPATIENT
Start: 2018-09-24 | End: 2018-09-24

## 2018-09-24 RX ADMIN — BEVACIZUMAB 277.6 MILLIGRAM(S): 400 INJECTION, SOLUTION INTRAVENOUS at 15:07

## 2018-09-25 ENCOUNTER — APPOINTMENT (OUTPATIENT)
Dept: PULMONOLOGY | Facility: CLINIC | Age: 53
End: 2018-09-25
Payer: MEDICAID

## 2018-09-25 VITALS
DIASTOLIC BLOOD PRESSURE: 86 MMHG | WEIGHT: 205 LBS | TEMPERATURE: 98.6 F | HEART RATE: 111 BPM | RESPIRATION RATE: 18 BRPM | SYSTOLIC BLOOD PRESSURE: 120 MMHG | OXYGEN SATURATION: 98 % | BODY MASS INDEX: 23.72 KG/M2 | HEIGHT: 78 IN

## 2018-09-25 DIAGNOSIS — F32.9 MAJOR DEPRESSIVE DISORDER, SINGLE EPISODE, UNSPECIFIED: ICD-10-CM

## 2018-09-25 DIAGNOSIS — I26.99 OTHER PULMONARY EMBOLISM W/OUT ACUTE COR PULMONALE: ICD-10-CM

## 2018-09-25 DIAGNOSIS — I82.409 ACUTE EMBOLISM AND THROMBOSIS OF UNSPECIFIED DEEP VEINS OF UNSPECIFIED LOWER EXTREMITY: ICD-10-CM

## 2018-09-25 DIAGNOSIS — C71.9 MALIGNANT NEOPLASM OF BRAIN, UNSPECIFIED: ICD-10-CM

## 2018-09-25 PROCEDURE — 99204 OFFICE O/P NEW MOD 45 MIN: CPT

## 2018-09-25 PROCEDURE — C1769: CPT

## 2018-09-25 PROCEDURE — 86901 BLOOD TYPING SEROLOGIC RH(D): CPT

## 2018-09-25 PROCEDURE — 71275 CT ANGIOGRAPHY CHEST: CPT

## 2018-09-25 PROCEDURE — 70450 CT HEAD/BRAIN W/O DYE: CPT

## 2018-09-25 PROCEDURE — 83880 ASSAY OF NATRIURETIC PEPTIDE: CPT

## 2018-09-25 PROCEDURE — 85610 PROTHROMBIN TIME: CPT

## 2018-09-25 PROCEDURE — 84156 ASSAY OF PROTEIN URINE: CPT

## 2018-09-25 PROCEDURE — 84484 ASSAY OF TROPONIN QUANT: CPT

## 2018-09-25 PROCEDURE — 71045 X-RAY EXAM CHEST 1 VIEW: CPT

## 2018-09-25 PROCEDURE — 83690 ASSAY OF LIPASE: CPT

## 2018-09-25 PROCEDURE — 85027 COMPLETE CBC AUTOMATED: CPT

## 2018-09-25 PROCEDURE — 74174 CTA ABD&PLVS W/CONTRAST: CPT

## 2018-09-25 PROCEDURE — C1894: CPT

## 2018-09-25 PROCEDURE — 82550 ASSAY OF CK (CPK): CPT

## 2018-09-25 PROCEDURE — 96374 THER/PROPH/DIAG INJ IV PUSH: CPT | Mod: XU

## 2018-09-25 PROCEDURE — 80048 BASIC METABOLIC PNL TOTAL CA: CPT

## 2018-09-25 PROCEDURE — 86850 RBC ANTIBODY SCREEN: CPT

## 2018-09-25 PROCEDURE — 93970 EXTREMITY STUDY: CPT

## 2018-09-25 PROCEDURE — 97161 PT EVAL LOW COMPLEX 20 MIN: CPT

## 2018-09-25 PROCEDURE — C1880: CPT

## 2018-09-25 PROCEDURE — 83735 ASSAY OF MAGNESIUM: CPT

## 2018-09-25 PROCEDURE — 82553 CREATINE MB FRACTION: CPT

## 2018-09-25 PROCEDURE — 85730 THROMBOPLASTIN TIME PARTIAL: CPT

## 2018-09-25 PROCEDURE — 93306 TTE W/DOPPLER COMPLETE: CPT

## 2018-09-25 PROCEDURE — 84100 ASSAY OF PHOSPHORUS: CPT

## 2018-09-25 PROCEDURE — 82962 GLUCOSE BLOOD TEST: CPT

## 2018-09-25 PROCEDURE — 84300 ASSAY OF URINE SODIUM: CPT

## 2018-09-25 PROCEDURE — 80053 COMPREHEN METABOLIC PANEL: CPT

## 2018-09-25 PROCEDURE — 85025 COMPLETE CBC W/AUTO DIFF WBC: CPT

## 2018-09-25 PROCEDURE — 86900 BLOOD TYPING SEROLOGIC ABO: CPT

## 2018-09-25 PROCEDURE — 99285 EMERGENCY DEPT VISIT HI MDM: CPT | Mod: 25

## 2018-09-25 PROCEDURE — 97530 THERAPEUTIC ACTIVITIES: CPT

## 2018-09-25 PROCEDURE — 81001 URINALYSIS AUTO W/SCOPE: CPT

## 2018-09-25 PROCEDURE — 36415 COLL VENOUS BLD VENIPUNCTURE: CPT

## 2018-09-25 PROCEDURE — 97116 GAIT TRAINING THERAPY: CPT

## 2018-09-25 PROCEDURE — 96375 TX/PRO/DX INJ NEW DRUG ADDON: CPT | Mod: XU

## 2018-09-25 PROCEDURE — 93005 ELECTROCARDIOGRAM TRACING: CPT

## 2018-09-25 RX ORDER — EDOXABAN TOSYLATE 60 MG/1
60 TABLET, FILM COATED ORAL
Qty: 30 | Refills: 2 | Status: ACTIVE | COMMUNITY
Start: 2018-09-25 | End: 1900-01-01

## 2018-09-27 ENCOUNTER — APPOINTMENT (OUTPATIENT)
Dept: PSYCHIATRY | Facility: CLINIC | Age: 53
End: 2018-09-27
Payer: MEDICAID

## 2018-09-27 ENCOUNTER — OTHER (OUTPATIENT)
Age: 53
End: 2018-09-27

## 2018-09-27 PROCEDURE — 90792 PSYCH DIAG EVAL W/MED SRVCS: CPT

## 2018-09-27 RX ORDER — ARIPIPRAZOLE 15 MG/1
0 TABLET ORAL
Qty: 0 | Refills: 0 | COMMUNITY

## 2018-09-27 RX ORDER — RISPERIDONE 4 MG/1
0 TABLET ORAL
Qty: 0 | Refills: 0 | COMMUNITY

## 2018-09-29 ENCOUNTER — OUTPATIENT (OUTPATIENT)
Dept: OUTPATIENT SERVICES | Facility: HOSPITAL | Age: 53
LOS: 1 days | End: 2018-09-29
Payer: COMMERCIAL

## 2018-09-29 ENCOUNTER — APPOINTMENT (OUTPATIENT)
Dept: MRI IMAGING | Facility: HOSPITAL | Age: 53
End: 2018-09-29

## 2018-09-29 DIAGNOSIS — Z98.890 OTHER SPECIFIED POSTPROCEDURAL STATES: Chronic | ICD-10-CM

## 2018-09-29 DIAGNOSIS — Z95.828 PRESENCE OF OTHER VASCULAR IMPLANTS AND GRAFTS: Chronic | ICD-10-CM

## 2018-09-29 DIAGNOSIS — C71.9 MALIGNANT NEOPLASM OF BRAIN, UNSPECIFIED: ICD-10-CM

## 2018-09-29 PROCEDURE — 70553 MRI BRAIN STEM W/O & W/DYE: CPT

## 2018-10-01 ENCOUNTER — APPOINTMENT (OUTPATIENT)
Dept: NEUROLOGY | Facility: CLINIC | Age: 53
End: 2018-10-01
Payer: MEDICAID

## 2018-10-01 VITALS
SYSTOLIC BLOOD PRESSURE: 117 MMHG | HEART RATE: 125 BPM | HEIGHT: 78 IN | WEIGHT: 203 LBS | OXYGEN SATURATION: 95 % | BODY MASS INDEX: 23.49 KG/M2 | DIASTOLIC BLOOD PRESSURE: 85 MMHG

## 2018-10-01 VITALS — HEART RATE: 106 BPM

## 2018-10-01 DIAGNOSIS — Z00.00 ENCOUNTER FOR GENERAL ADULT MEDICAL EXAMINATION W/OUT ABNORMAL FINDINGS: ICD-10-CM

## 2018-10-01 PROCEDURE — 99214 OFFICE O/P EST MOD 30 MIN: CPT

## 2018-10-01 RX ORDER — MIRTAZAPINE 7.5 MG/1
7.5 TABLET, FILM COATED ORAL
Qty: 30 | Refills: 0 | Status: ACTIVE | COMMUNITY
Start: 2018-10-01

## 2018-10-01 RX ORDER — ZOLPIDEM TARTRATE 10 MG/1
10 TABLET ORAL
Qty: 30 | Refills: 0 | Status: DISCONTINUED | COMMUNITY
Start: 2018-04-26 | End: 2018-10-01

## 2018-10-01 RX ORDER — COLCHICINE 0.6 MG/1
0.6 TABLET ORAL
Qty: 33 | Refills: 0 | Status: DISCONTINUED | COMMUNITY
Start: 2018-07-19 | End: 2018-10-01

## 2018-10-01 RX ORDER — TAMSULOSIN HYDROCHLORIDE 0.4 MG/1
0.4 CAPSULE ORAL
Qty: 30 | Refills: 2 | Status: DISCONTINUED | COMMUNITY
Start: 2018-04-18 | End: 2018-10-01

## 2018-10-01 RX ORDER — MIRTAZAPINE 7.5 MG/1
7.5 TABLET, FILM COATED ORAL
Refills: 0 | Status: DISCONTINUED | COMMUNITY

## 2018-10-01 RX ORDER — OLANZAPINE 10 MG/1
10 TABLET, FILM COATED ORAL
Qty: 30 | Refills: 2 | Status: DISCONTINUED | COMMUNITY
Start: 2018-03-28 | End: 2018-10-01

## 2018-10-01 RX ORDER — LAMOTRIGINE 25 MG/1
25 TABLET ORAL AT BEDTIME
Qty: 30 | Refills: 2 | Status: ACTIVE | COMMUNITY
Start: 2018-03-29

## 2018-10-01 RX ORDER — ARIPIPRAZOLE 5 MG/1
5 TABLET ORAL DAILY
Qty: 30 | Refills: 0 | Status: ACTIVE | COMMUNITY

## 2018-10-03 ENCOUNTER — APPOINTMENT (OUTPATIENT)
Dept: INTERNAL MEDICINE | Facility: CLINIC | Age: 53
End: 2018-10-03

## 2018-10-04 ENCOUNTER — APPOINTMENT (OUTPATIENT)
Dept: PSYCHIATRY | Facility: CLINIC | Age: 53
End: 2018-10-04

## 2018-10-04 RX ORDER — ONDANSETRON 8 MG/1
1 TABLET, FILM COATED ORAL
Qty: 120 | Refills: 0 | OUTPATIENT
Start: 2018-10-04 | End: 2018-11-02

## 2018-10-04 RX ORDER — RISPERIDONE 4 MG/1
1 TABLET ORAL
Qty: 60 | Refills: 1 | OUTPATIENT
Start: 2018-10-04 | End: 2018-12-02

## 2018-10-08 ENCOUNTER — APPOINTMENT (OUTPATIENT)
Dept: INFUSION THERAPY | Facility: HOSPITAL | Age: 53
End: 2018-10-08

## 2018-10-08 RX ORDER — APIXABAN 5 MG/1
5 TABLET, FILM COATED ORAL TWICE DAILY
Qty: 60 | Refills: 3 | Status: ACTIVE | COMMUNITY
Start: 2018-10-08 | End: 1900-01-01

## 2018-10-22 ENCOUNTER — APPOINTMENT (OUTPATIENT)
Dept: INFUSION THERAPY | Facility: HOSPITAL | Age: 53
End: 2018-10-22

## 2018-10-25 ENCOUNTER — OTHER (OUTPATIENT)
Age: 53
End: 2018-10-25

## 2018-11-01 ENCOUNTER — APPOINTMENT (OUTPATIENT)
Dept: PSYCHIATRY | Facility: CLINIC | Age: 53
End: 2018-11-01

## 2018-11-05 ENCOUNTER — APPOINTMENT (OUTPATIENT)
Dept: INFUSION THERAPY | Facility: HOSPITAL | Age: 53
End: 2018-11-05

## 2018-11-05 ENCOUNTER — APPOINTMENT (OUTPATIENT)
Dept: NEUROLOGY | Facility: CLINIC | Age: 53
End: 2018-11-05

## 2018-11-06 ENCOUNTER — APPOINTMENT (OUTPATIENT)
Dept: PULMONOLOGY | Facility: CLINIC | Age: 53
End: 2018-11-06

## 2018-11-12 ENCOUNTER — APPOINTMENT (OUTPATIENT)
Dept: NEUROLOGY | Facility: CLINIC | Age: 53
End: 2018-11-12

## 2018-11-21 ENCOUNTER — APPOINTMENT (OUTPATIENT)
Dept: NEUROLOGY | Facility: CLINIC | Age: 53
End: 2018-11-21

## 2018-12-14 ENCOUNTER — OTHER (OUTPATIENT)
Age: 53
End: 2018-12-14

## 2019-01-01 NOTE — PROGRESS NOTE ADULT - ASSESSMENT
54yo M with PMHx GBM (dx 2017, mass resections x 2, chemo, radiation), PE, gout, seizure d/o, depression, anxiety, presented for dizziness, left sided neck/back pain, and substernal chest pain found to have saddle embolus on CTA with mild RV strain on TTE, likely 2/2 GBM, currently on heparin drip with PTT monitoring transitioning to Lovenox 50 BID, first dose tonight at 10pm. Normal rate, regular rhythm, normal S1, S2 heart sounds heard.

## 2019-01-04 NOTE — ED PROVIDER NOTE - CARDIOVASCULAR [+], MLM
Found pt home pharmacy[facility] in [facility]  Pt has not filled any medications since August 2018  Pt is not able to participate in interview at this time.            CHEST PAIN

## 2019-01-08 NOTE — PROGRESS NOTE ADULT - I WAS PHYSICALLY PRESENT FOR THE KEY PORTIONS OF THE EVALUATION AND MANAGEMENT (E/M) SERVICE PROVIDED.  I AGREE WITH THE ABOVE HISTORY, PHYSICAL, AND PLAN WHICH I HAVE REVIEWED AND EDITED WHERE APPROPRIATE
Take your medications as directed, and keep your follow up appointments  Adhere to a heart healthy lifestyle, maintaining a low sodium diet  Daily weight and record  If your weight increases 2-3 lbs in one day, or 5 lbs in 2 days, you are short of breath or have lower extremity swelling, please call the heart failure team at  [facility] at [phone]  DASH Eating Plan   WHAT YOU NEED TO KNOW:   What is the DASH Eating Plan? The DASH (Dietary Approaches to Stop Hypertension) Eating Plan is designed to help prevent or lower high blood pressure  It can also help to lower LDL (bad) cholesterol and decrease your risk for heart disease  The plan is low in sodium, sugar, unhealthy fats, and total fat  It is high in potassium, calcium, magnesium, and fiber  These nutrients are added when you eat more fruits, vegetables, and whole grains  What is my sodium limit for each day? Your dietitian will tell you how much sodium is safe for you to have each day  People with high blood pressure should have no more than 1,500 to 2,300 mg of sodium in a day  A teaspoon (tsp) of salt has 2,300 mg of sodium  This may seem like a difficult goal, but small changes to the foods you eat can make a big difference  Your healthcare provider or dietitian can help you create a meal plan that follows your sodium limit  How do I limit sodium? · Read food labels  Food labels can help you choose foods that are low in sodium  The amount of sodium is listed in milligrams (mg)  The % Daily Value (DV) column tells you how much of your daily needs are met by 1 serving of the food for each nutrient listed  Choose foods that have less than 5% of the DV of sodium  These foods are considered low in sodium  Foods that have 20% or more of the DV of sodium are considered high in sodium  Avoid foods that have more than 300 mg of sodium in each serving   Choose foods that say low-sodium, reduced-sodium, or no salt added on the food
Statement Selected
label            · Avoid salt  Do not salt food at the table, and add very little salt to foods during cooking  Use herbs and spices, such as onions, garlic, and salt-free seasonings to add flavor to foods  Try lemon or lime juice or vinegar to give foods a tart flavor  Use hot peppers or a small amount of hot pepper sauce to add a spicy flavor to foods  · Ask about salt substitutes  Ask your healthcare provider if you may use salt substitutes  Some salt substitutes have ingredients that can be harmful if you have certain health conditions  · Choose foods carefully at restaurants  Meals from restaurants, especially fast food restaurants, are often high in sodium  Some restaurants have nutrition information that tells you the amount of sodium in their foods  Ask to have your food prepared with less, or no salt  What should I know about fats? · Include healthy fats  Examples are unsaturated fats and omega-3 fatty acids  Unsaturated fats are found in soybean, canola, olive, or sunflower oil, and liquid and soft tub margarines  Omega-3 fatty acids are found in fatty fish, such as salmon, tuna, mackerel, and sardines  It is also found in flaxseed oil and ground flaxseed  · Avoid unhealthy fats  Do not eat unhealthy fats, such as saturated fats and trans fats  Saturated fats are found in foods that contain fat from animals  Examples are fatty meats, whole milk, butter, cream, and other dairy foods  It is also found in shortening, stick margarine, palm oil, and coconut oil  Trans fats are found in fried foods, crackers, chips, and baked goods made with margarine or shortening  Which foods should I include? With the DASH eating plan, you need to eat a certain number of servings from each food group  This will help you get enough of certain nutrients and limit others  The amount of servings you should eat depends on how many calories you need  Your dietitian can tell you how many calories you need   The
number of servings listed next to the food groups below are for people who need about 2,000 calories each day  · Grains:  6 to 8 servings (3 of these servings should be whole-grain foods)    ¨ 1 slice of whole-grain bread     ¨ 1 ounce of dry cereal    ¨ ½ cup of cooked cereal, pasta, or brown rice    · Vegetables and fruits:  4 to 5 servings of fruits and 4 to 5 servings of vegetables    ¨ 1 medium fruit    ¨ 1 cup of raw leafy vegetable    ¨ ½ cup of frozen, canned (no added salt), or chopped fresh vegetables     ¨ ½ cup of fresh, frozen, dried, or canned fruit (canned in light syrup or fruit juice)    ¨ ½ cup of vegetable or fruit juice    · Dairy:  2 to 3 servings    ¨ 1 cup of nonfat (skim) or 1% milk    ¨ 1½ ounces of fat-free or low-fat, low-sodium cheese    ¨ 6 ounces of nonfat or low-fat yogurt    · Lean meat, poultry, and fish:  6 ounces or less    [employer] (chicken, turkey) with no skin    ¨ Fish (especially fatty fish, such as salmon, fresh tuna, or mackerel)    ¨ Lean beef and pork (loin, round, extra lean hamburger)    ¨ Egg whites and egg substitutes    · Nuts, seeds, and legumes:  4 to 5 servings each week    ¨ ½ cup of cooked beans and peas    ¨ 1½ ounces of unsalted nuts    ¨ 2 tablespoons of peanut butter or seeds    · Sweets and added sugars:  5 or less each week    ¨ 1 tablespoon of sugar, jelly, or jam    ¨ ½ cup of sorbet or gelatin    ¨ 1 cup of lemonade    · Fats:  2 to 3 servings each week    ¨ 1 teaspoon of soft margarine or vegetable oil    ¨ 1 tablespoon of mayonnaise    ¨ 2 tablespoons of salad dressing  Which foods should I avoid?    · Grains:      [employer], such as doughnuts, pastries, cookies, and biscuits (high in fat and sugar)    ¨ Mixes for cornbread and biscuits, packaged foods, such as bread stuffing, rice and pasta mixes, macaroni and cheese, and instant cereals (high in sodium)    · Fruits and vegetables:      ¨ Regular, canned vegetables (high in
sodium)    ¨ Sauerkraut, pickled vegetables, and other foods prepared in brine (high in sodium)    ¨ Fried vegetables or vegetables in butter or high-fat sauces    ¨ Fruit in cream or butter sauce (high in fat)    · Dairy:      ¨ Whole milk, 2% milk, and cream (high in fat)    ¨ Regular cheese and processed cheese (high in fat and sodium)    · Meats and protein foods:      ¨ Smoked or cured meat, such as corned beef, roberts, ham, hot dogs, and sausage (high in fat and sodium)    ¨ Canned beans and canned meats or spreads, such as potted meats, sardines, anchovies, and imitation seafood (high in sodium)    ¨ Deli or lunch meats, such as bologna, ham, turkey, and roast beef (high in sodium)    ¨ High-fat meat (T-bone steak, regular hamburger, and ribs)    ¨ Whole eggs and egg yolks (high in fat)    · Other:      ¨ Seasonings made with salt, such as garlic salt, celery salt, onion salt, seasoned salt, meat tenderizers, and monosodium glutamate (MSG)    ¨ Miso soup and canned or dried soup mixes (high in sodium)    ¨ Regular soy sauce, barbecue sauce, teriyaki sauce, steak sauce, Worcestershire sauce, and most flavored vinegars (high in sodium)    ¨ Regular condiments, such as mustard, ketchup, and salad dressings (high in sodium)    ¨ Gravy and sauces, such as Bebeto or cheese sauces (high in sodium and fat)    ¨ Drinks high in sugar, such as soda or fruit drinks    [employer] foods, such as salted chips, popcorn, pretzels, pork rinds, salted crackers, and salted nuts    ¨ Frozen foods, such as dinners, entrees, vegetables with sauces, and breaded meats (high in sodium)  What other guidelines should I follow? · Maintain a healthy weight  Your risk for heart disease is higher if you are overweight  Your healthcare provider may suggest that you lose weight if you are overweight  You can lose weight by eating fewer calories and foods that have added sugars and fat  The DASH meal plan can help you do this   Decrease
calories by eating smaller portions at each meal and fewer snacks  Ask your healthcare provider for more information about how to lose weight  · Exercise regularly  Regular exercise can help you reach or maintain a healthy weight  Regular exercise can also help decrease your blood pressure and improve your cholesterol levels  Get 30 minutes or more of moderate exercise each day of the week  To lose weight, get at least 60 minutes of exercise  Talk to your healthcare provider about the best exercise program for you  · Limit alcohol  Women should limit alcohol to 1 drink a day  Men should limit alcohol to 2 drinks a day  A drink of alcohol is 12 ounces of beer, 5 ounces of wine, or 1½ ounces of liquor  Where can I find more information? · National [facility], [facility] and [address]  [address] [address]  [place] [address]  Phone: [phone]  Web Address: [facility] AGREEMENT:   You have the right to help plan your care  Discuss treatment options with your caregivers to decide what care you want to receive  You always have the right to refuse treatment  The above information is an  only  It is not intended as medical advice for individual conditions or treatments  Talk to your doctor, nurse or pharmacist before following any medical regimen to see if it is safe and effective for you  © 2017 [employer]  Information is for End User's use only and may not be sold, redistributed or otherwise used for commercial purposes  All illustrations and images included in CareNotes® are the copyrighted property of [employer]  or B[employer] Hawley 
Statement Selected
Statement Selected

## 2019-10-10 NOTE — ED PROVIDER NOTE - PRINCIPAL DIAGNOSIS
Depression Pt will improve standing balance(S/D) to G/G- to increase dynamic activities within 3-4 weeks.

## 2021-01-05 NOTE — BEHAVIORAL HEALTH ASSESSMENT NOTE - MOOD
OK-MEM.    
PER pharmacy would like a refill of med.  PT. last visit  10/08/20.  PT. refill set to E-PRESCRIBE upon approval.       
Depressed/Anxious

## 2021-09-28 NOTE — PATIENT PROFILE ADULT. - [facility]
.. [address]  Phone: [phone]  Fax: [phone]    September 28, 2021    [address] [place] [address] [address]    Dear Dank Camp,    Thank you for choosing our [facility] on 9/26/21. Dr. Jennifer Love wanted to make sure that you understand your discharge instructions and that you were able to fill any prescriptions that may have been ordered for you. Please contact the office at the above phone number if advised you to follow up with Dr. Jennifer Love, or if you have any further questions or needs. Also, did you know -                             [facility]) practices can often offer you an appointment on the same day that you call for acute issues. *We have some [facility] offices that offer Walk-in appointments; check our website for availability in your community, www. [place] [place].      *Evisits are now available for patients through [address] If you do not have MyChart and are interested, please contact the office and a staff member may assist you or go to [email or web address].     Sincerely,     Jennifer Love MD and your [facility]
(4) no limitation

## 2022-03-29 NOTE — BRIEF OPERATIVE NOTE - BRIEF OP NOTE DRAINS
Risk factors: , long history of secondary amenorrhea, low body weight, family history   Continue ca/vitamin D (lower supplemental calcium)  Continue dietary calcium     Balance and exercise training   Repeat DXA scan 12/2022   None

## 2022-06-08 NOTE — ED PROVIDER NOTE - NEURO NEGATIVE STATEMENT, MLM
Detail Level: Detailed
no loss of consciousness, no gait abnormality, no headache, no sensory deficits, and no weakness.

## 2023-02-03 NOTE — PHYSICAL THERAPY INITIAL EVALUATION ADULT - GAIT DEVIATIONS NOTED, PT EVAL
03-Feb-2023 03:59 decreased weight-shifting ability/increased time in double stance/decreased step length/decreased heel-toe/decreased lady

## 2023-07-25 NOTE — BEHAVIORAL HEALTH ASSESSMENT NOTE - NS ED BHA MSE SPEECH SPONTANEITY
Laboratory called the about patient's stool cultures showcasing E. coli 0157.  Called patient, did not answer.  Spoke with emergency contact, mother.  Relayed results to her.  Recommended he follow-up with his primary care doctor Dr. Cole.  Patient is scheduled for a colonoscopy this week.
Normal

## 2024-01-31 NOTE — OCCUPATIONAL THERAPY INITIAL EVALUATION ADULT - ADDITIONAL COMMENTS
2 seconds or less
Patient states being independent with functional mobility and Activities of Daily Living PTA, however reports impaired balance leading to admission. Patient has home health aide 4 days/week, 8h/day mainly assisting with instrumental activities of daily living.

## 2024-04-09 NOTE — DISCHARGE NOTE ADULT - HOME CARE AGENCY
[facility] [phone] (no pain/absence of nonverbal indicators of pain)

## 2024-04-21 NOTE — PROGRESS NOTE ADULT - ATTENDING COMMENTS
[facility]  Hematology/Oncology  [address] [place] [address]  Phone: [phone]  Fax:     [facility]  Hematology/Oncology  [address] [place] [address]  Phone: [phone]  Fax:     [facility]  Gastroenterology  [address] [address]  Phone: [phone]  Fax:     
Patient seen and examined with house-staff during bedside rounds.  Resident note read, including vitals, physical findings, laboratory data, and radiological reports.   Revisions included below.  Direct personal management at bed side and extensive interpretation of the data.  Plan was outlined and discussed in details with the housestaff.  Decision making of high complexity  Action taken for acute disease activity to reflect the level of care provided:  - medication reconciliation  - review laboratory data
Patient seen and examined with house-staff during bedside rounds.  Resident note read, including vitals, physical findings, laboratory data, and radiological reports.   Revisions included below.  Direct personal management at bed side and extensive interpretation of the data.  Plan was outlined and discussed in details with the housestaff.  Decision making of high complexity  Action taken for acute disease activity to reflect the level of care provided:  - medication reconciliation  - review laboratory data

## 2025-03-09 NOTE — ED BEHAVIORAL HEALTH ASSESSMENT NOTE - ORIENTED TO PERSON
"Chief Complaint   Patient presents with    Fall     Pt reports a mechanical glf. Reporting left hip, left knee, and right elbow pain. -head strike, -thinners       Pt BIBA from group home with the above complaint. Pt is GCS 14, ano 3x at baseline. FSBG 178. No obvious deformity, CNS intact.     BP (!) 143/64   Pulse 76   Temp 36.3 °C (97.3 °F) (Temporal)   Resp 16   Ht 1.753 m (5' 9\")   Wt 90.7 kg (200 lb)   SpO2 95%   BMI 29.53 kg/m²       " Yes